# Patient Record
Sex: FEMALE | Race: BLACK OR AFRICAN AMERICAN | Employment: FULL TIME | URBAN - METROPOLITAN AREA
[De-identification: names, ages, dates, MRNs, and addresses within clinical notes are randomized per-mention and may not be internally consistent; named-entity substitution may affect disease eponyms.]

---

## 2021-07-13 ENCOUNTER — HOSPITAL ENCOUNTER (EMERGENCY)
Age: 21
Discharge: HOME | End: 2021-07-13
Attending: EMERGENCY MEDICINE

## 2021-07-13 VITALS
TEMPERATURE: 97.8 F | HEIGHT: 60 IN | DIASTOLIC BLOOD PRESSURE: 99 MMHG | OXYGEN SATURATION: 100 % | SYSTOLIC BLOOD PRESSURE: 156 MMHG | RESPIRATION RATE: 18 BRPM | WEIGHT: 132.94 LBS | BODY MASS INDEX: 26.1 KG/M2 | HEART RATE: 93 BPM

## 2021-07-13 DIAGNOSIS — I10 ASYMPTOMATIC HYPERTENSION: ICD-10-CM

## 2021-07-13 DIAGNOSIS — R03.0 ELEVATED BLOOD PRESSURE READING: Primary | ICD-10-CM

## 2021-07-13 PROCEDURE — 99282 EMERGENCY DEPT VISIT SF MDM: CPT

## 2021-07-13 NOTE — ED TRIAGE NOTES
Positive home pregnancy test. LMP 06/10/21. States 's at home. Hx HTN but not on medications, is trying home remedies. Blurry vision and near-syncope on way to work today. Symptoms resolved but having mild HA.

## 2021-07-13 NOTE — DISCHARGE INSTRUCTIONS
Please schedule an appointment to establish care with PCP. If you develop any new or concerning symptoms please return to ER.

## 2021-07-13 NOTE — Clinical Note
[address]  [address] [facility] [id number]  [phone]    Work/School Note    Date: 7/13/2021    To Whom It May concern:      Karl Gibson was seen and treated today in the emergency room by the following provider(s):  Attending Provider: Kimani Chowdhury MD  Physician Assistant: Fleurette Cogan, PA. Karl Gibson is excused from work/school on 07/13/21. She is clear to return to work/school on 07/14/21.         Sincerely,          Tamela Severin, PA

## 2021-07-13 NOTE — ED PROVIDER NOTES
Patient is a 23year old female with a history of hypertension who presents to ED c/o elevated blood pressure. Reports she recently found out she was pregnant, LMP 06/10/21. She was seen for new pregnancy and had UA and basic labs performed this week which were normal. States she was told to see a PCP for blood pressure management so she came to be seen by ED. States earlier today she had an episode of nausea and lightheadedness which she attributed to her BP. States these symptoms have resolved and she is currently asymptomatic. She denies any chest pain, shortness of braeth, headache, visual changes, difficulty breathing, vaginal bleeding/discharge, syncope. She is seeking natural remedies for blood pressure management. States she eats vegan. Past Medical History:   Diagnosis Date    Asthma     Depression     Eczema     Hypertension     Second hand smoke exposure     Vision decreased     glasses used, c/o vision problems       No past surgical history on file.       Family History:   Problem Relation Age of Onset    Hypertension Mother     Asthma Sister     Asthma Brother     Hypertension Maternal Uncle     Hypertension Maternal Grandmother     Asthma Maternal Grandmother     Cancer Other         breast cancer       Social History     Socioeconomic History    Marital status: SINGLE     Spouse name: Not on file    Number of children: Not on file    Years of education: Not on file    Highest education level: Not on file   Occupational History    Not on file   Tobacco Use    Smoking status: Never Smoker   Substance and Sexual Activity    Alcohol use: No    Drug use: No    Sexual activity: Never   Other Topics Concern    Not on file   Social History Narrative    ** Merged History Encounter **          Social Determinants of Health     Financial Resource Strain:     Difficulty of Paying Living Expenses:    Food Insecurity:     Worried About Running Out of Food in the Last Year:     Ran Out of Food in the Last Year:    Transportation Needs:     Lack of Transportation (Medical):  Lack of Transportation (Non-Medical):    Physical Activity:     Days of Exercise per Week:     Minutes of Exercise per Session:    Stress:     Feeling of Stress :    Social Connections:     Frequency of Communication with Friends and Family:     Frequency of Social Gatherings with Friends and Family:     Attends Scientologist Services:     Active Member of Clubs or Organizations:     Attends Club or Organization Meetings:     Marital Status:    Intimate Partner Violence:     Fear of Current or Ex-Partner:     Emotionally Abused:     Physically Abused:     Sexually Abused: ALLERGIES: Patient has no known allergies. Review of Systems   Constitutional: Negative for chills, fatigue and fever. HENT: Negative for congestion and sore throat. Eyes: Negative for pain, discharge and visual disturbance. Respiratory: Negative for cough and shortness of breath. Cardiovascular: Negative for chest pain, palpitations and leg swelling. Gastrointestinal: Negative for abdominal pain, diarrhea, nausea and vomiting. Genitourinary: Positive for vaginal discharge. Negative for dysuria, urgency and vaginal bleeding. Musculoskeletal: Negative for back pain and neck pain. Skin: Negative for rash. Allergic/Immunologic: Negative for immunocompromised state. Neurological: Negative for syncope, weakness, light-headedness and headaches. Psychiatric/Behavioral: Negative for confusion. All other systems reviewed and are negative. Vitals:    07/13/21 1340   BP: (!) 162/101   Pulse: 93   Resp: 18   Temp: 97.8 °F (36.6 °C)   SpO2: 98%   Weight: 60.3 kg (132 lb 15 oz)   Height: 5' (1.524 m)            Physical Exam  Vitals and nursing note reviewed. Constitutional:       General: She is not in acute distress. Appearance: Normal appearance. She is well-developed. She is not toxic-appearing. HENT:      Head: Normocephalic and atraumatic. Nose: Nose normal.      Mouth/Throat:      Mouth: Mucous membranes are moist.   Eyes:      General: Lids are normal.      Extraocular Movements: Extraocular movements intact. Conjunctiva/sclera: Conjunctivae normal.      Pupils: Pupils are equal, round, and reactive to light. Cardiovascular:      Rate and Rhythm: Normal rate and regular rhythm. Pulses: Normal pulses. Heart sounds: Normal heart sounds, S1 normal and S2 normal.   Pulmonary:      Effort: Pulmonary effort is normal. No accessory muscle usage. Breath sounds: Normal breath sounds. Abdominal:      Palpations: Abdomen is soft. Musculoskeletal:         General: Normal range of motion. Cervical back: Normal range of motion and neck supple. Skin:     General: Skin is warm and dry. Capillary Refill: Capillary refill takes less than 2 seconds. Neurological:      General: No focal deficit present. Mental Status: She is alert and oriented to person, place, and time. Mental status is at baseline. Psychiatric:         Attention and Perception: Attention normal.         Mood and Affect: Mood and affect normal.         Speech: Speech normal.         Behavior: Behavior is cooperative. Thought Content: Thought content normal.         Cognition and Memory: Cognition normal.         Judgment: Judgment normal.          MDM  Number of Diagnoses or Management Options  Asymptomatic hypertension  Elevated blood pressure reading  Diagnosis management comments: Patient is well appearing and asymptomatic at this time. She has been diagnosed with hypertension in the past. States due to genetics. Advised establishing care with PCP and follow-up with cardiology or nephrology given possible causes of secondary hypertension. Offered patient testing today which she declined given she was seen and had basic labs and UA drawn. Patient understands and agrees with plan.  All questions addressed and answered.        Amount and/or Complexity of Data Reviewed  Discuss the patient with other providers: yes (Dr. Cristi Abreu, Ed Attending )           Procedures

## 2021-09-02 ENCOUNTER — OFFICE VISIT (OUTPATIENT)
Dept: OBGYN CLINIC | Age: 21
End: 2021-09-02

## 2021-09-02 VITALS
DIASTOLIC BLOOD PRESSURE: 84 MMHG | BODY MASS INDEX: 26.7 KG/M2 | HEIGHT: 60 IN | WEIGHT: 136 LBS | SYSTOLIC BLOOD PRESSURE: 146 MMHG

## 2021-09-02 DIAGNOSIS — Z34.00 ENCOUNTER FOR SUPERVISION PREGNANCY IN PRIMIGRAVIDA, ANTEPARTUM: ICD-10-CM

## 2021-09-02 DIAGNOSIS — Z34.90 PREGNANCY, UNSPECIFIED GESTATIONAL AGE: Primary | ICD-10-CM

## 2021-09-02 PROCEDURE — 0502F SUBSEQUENT PRENATAL CARE: CPT | Performed by: OBSTETRICS & GYNECOLOGY

## 2021-09-02 RX ORDER — ASPIRIN 81 MG/1
81 TABLET ORAL DAILY
Qty: 90 TABLET | Refills: 3 | Status: SHIPPED | OUTPATIENT
Start: 2021-09-02 | End: 2022-02-04

## 2021-09-02 RX ORDER — LABETALOL 100 MG/1
100 TABLET, FILM COATED ORAL 2 TIMES DAILY
Qty: 60 TABLET | Refills: 5 | Status: SHIPPED | OUTPATIENT
Start: 2021-09-02 | End: 2021-12-30 | Stop reason: ALTCHOICE

## 2021-09-02 NOTE — PROGRESS NOTES
Current pregnancy history:    Kendell Ramos is a  23 y.o. female BLACK/ No LMP recorded. Patient is pregnant. She presents for the evaluation of amenorrhea and a positive pregnancy test.    LMP history:  The date of her LMP is uncertain. She thinks it was sometime in the end of May. Ultrasound data:  She had an ultrasound performed today in the office which revealed a viable fernandez pregnancy with a gestational age of 17w3d giving an [address] of 3/6/22. TA ULTRASOUND PERFORMED  A SINGLE VIABLE 13W4D WITH SANTANA OF 03/06/2022 IUP IS SEEN WITH NORMAL CARDIAC RHYTHM. GESTATIONAL AGE BASED ON TODAYS EXAM.  THERE APPEARS TO BE A POSTERIOR PLACENTA. RIGHT OVARY APPEARS WNL. LEFT OVARY APPEARS WNL. NO FREE FLUID SEEN IN THE CDS. Pregnancy symptoms:  Since her LMP she has experienced no significant symptoms. Mild nausea. She denies dysuria, discharge, vaginal bleeding. Past pregnancy history:  G1    Social:  Pt works as  at [employer]. FOB [employer] people move out. \"  _ _ _ _ _ _ _ _ _ _ _ _ _ _ _ _ _ _ _ _ _ _ _ _ _ _ _ _ _ _ _ _     Substance history: negative for alcohol, tobacco and street drugs. Positive for marijuana. Exposure history: There is/are no indoor cat/s in the home. The patient was instructed to not change the cat litter. She admits close contact with children on a regular basis. She has had chicken pox or the vaccine in the past.   Patient denies issues with domestic violence. Genetic Screening/Teratology Counseling: (Includes patient, baby's father, or anyone in either family with:)  3.  Patient's age >/= 28 at EDC?--no  2.   Thalassemia ([facility], [facility], [address], or  background): MCV<80?--no.     3.  Neural tube defect (meningomyelocele, spina bifida, anencephaly)?--no.   4.  Congenital heart defect?--no.  5.  Down syndrome?--no.   6.  Bryan-Sachs ([employer], [place])?--no.   7.  Canavan's Disease?--no.   8. Familial Dysautonomia?--no.   9.  Sickle cell disease or trait ()? --no   The patient has not been tested for sickle trait  10. Hemophilia or other blood disorders?--no. 11.  Muscular dystrophy?--no. 12.  Cystic fibrosis?--no. 13.  [place]'s Chorea?--no. 14.  Mental retardation/autism (if yes was person tested for Fragile X)?--no. 15.  Other inherited genetic or chromosomal disorder?--no. 12.  Maternal metabolic disorder (DM, PKU, etc)?--no. 17.  Patient or FOB with a child with a birth defect not listed above?--no.  17a. Patient or FOB with a birth defect themselves?--no. 18.  Recurrent pregnancy loss, or stillbirth?--no. 19.  Any medications since LMP other than prenatal vitamins (include vitamins, supplements, OTC meds, drugs, alcohol)?--no. 20.  Any other genetic/environmental exposure to discuss?--no. Infection History:  1. Lives with someone with TB or TB exposed?--no.   2.  Patient or partner has history of genital herpes?--no.  3.  Rash or viral illness since LMP?--no.    4.  History of STD (GC, CT, HPV, syphilis, HIV)? --no   5. Other: OTHER? Past Medical History:   Diagnosis Date    Asthma     Depression     Eczema     Hypertension     Second hand smoke exposure     Vision decreased     glasses used, c/o vision problems     No past surgical history on file.   Social History     Occupational History    Not on file   Tobacco Use    Smoking status: Never Smoker   Substance and Sexual Activity    Alcohol use: No    Drug use: No    Sexual activity: Never     Family History   Problem Relation Age of Onset    Hypertension Mother     Asthma Sister     Asthma Brother     Hypertension Maternal Uncle     Hypertension Maternal Grandmother     Asthma Maternal Grandmother     Cancer Other         breast cancer     OB History    Para Term  AB Living   1             SAB TAB Ectopic Molar Multiple Live Births                    # Outcome Date GA Lbr Ernesto/2nd Weight Sex Delivery Anes PTL Lv   1 Current              No Known Allergies  Prior to Admission medications    Medication Sig Start Date End Date Taking? Authorizing Provider   prenatal multivit-ca-min-fe-fa (Prenatal Vitamin) tab Take  by mouth. Yes Provider, Historical   labetaloL (NORMODYNE) 100 mg tablet Take 1 Tablet by mouth two (2) times a day. 9/2/21  Yes Alexi Burton MD   aspirin delayed-release 81 mg tablet Take 1 Tablet by mouth daily.  9/2/21  Yes Alexi Burton MD        Review of Systems: History obtained from the patient  Constitutional: negative for weight loss, fever, night sweats  HEENT: negative for hearing loss, earache, congestion, snoring, sore throat  CV: negative for chest pain, palpitations, edema  Resp: negative for cough, shortness of breath, wheezing  Breast: negative for breast lumps, nipple discharge, galactorrhea  GI: negative for change in bowel habits, abdominal pain, black or bloody stools  : negative for frequency, dysuria, hematuria, vaginal discharge  MSK: negative for back pain, joint pain, muscle pain  Skin: negative for itching, rash, hives  Neuro: negative for dizziness, headache, confusion, weakness  Psych: negative for anxiety, depression, change in mood  Heme/lymph: negative for bleeding, bruising, pallor    Objective:  Visit Vitals  BP (!) 146/84 (BP 1 Location: Left arm, BP Patient Position: Sitting)   Ht 5' (1.524 m)   Wt 136 lb (61.7 kg)   BMI 26.56 kg/m²       Physical Exam:     Constitutional  · Appearance: well-nourished, well developed, alert, in no acute distress    HENT  · Head  · Face: appears normal  · Eyes: appear normal  · Ears: normal  · Mouth: normal  · Lips: no lesions      Chest  · Respiratory Effort: breathing unlabored     Cardiovascular  · Heart:  · Auscultation: regular rate and rhythm without murmur      Gastrointestinal  · Abdominal Examination: abdomen non-tender to palpation, normal bowel sounds, no masses present  · Liver and spleen: no hepatomegaly present, spleen not palpable  · Hernias: no hernias identified      Skin  · General Inspection: no rash, no lesions identified    Neurologic/Psychiatric  · Mental Status:  · Orientation: grossly oriented to person, place and time  · Mood and Affect: mood normal, affect appropriate      Assessment and Plan:     Intrauterine pregnancy with issues addressed in problem list.  We discussed genetic testing screening options for the pregnancy and offered NIPT and the patient desires NIPT. We discussed carrier screening as per ACOG guidelines for CF, SMA, DMD, and Fragile X syndrome and the patient declines carrier screening. Course of pregnancy discussed including visit schedule, routine U/S, glucola testing, etc.  Avoid alcoholic beverages and illicit/recreational drugs use. Take prenatal vitamins or folic acid daily. Hospital and practice style discussed with coverage system. Discussed nutrition, toxoplasmosis precautions, sexual activity, exercise, need for influenza vaccine, environmental and work hazards, travel advice, screen for domestic violence, need for seat belts. Discussed seafood, unpasteurized dairy products, deli meat, artificial sweeteners, and caffeine. Discussed current prescription drug use. Given medication list.  Discussed the use of over the counter medications and chemicals. Route of delivery discussed, including risks, benefits  Handouts given to pt. RTO 4 weeks.     Herb Boothe MD

## 2021-09-02 NOTE — PATIENT INSTRUCTIONS
Weeks 10 to 14 of Your Pregnancy: Care Instructions  Overview     By weeks 10 to 14 of your pregnancy, the placenta has formed inside your uterus. The placenta's main job is to give your baby oxygen and nutrients through the umbilical cord. It's possible to hear your baby's heartbeat with a special ultrasound device. Your baby's organs are developing. The arms and legs can bend. This is a good time to think about testing for birth defects. There are two types of tests: screening and diagnostic. Screening tests show the chance that a baby has a certain birth defect. They can't tell you for sure that your baby has a problem. Diagnostic tests show if a baby has a certain birth defect. It's your choice whether to have these tests. You and your partner can talk to your doctor or midwife about tests for birth defects. Follow-up care is a key part of your treatment and safety. Be sure to make and go to all appointments, and call your doctor if you are having problems. It's also a good idea to know your test results and keep a list of the medicines you take. How can you care for yourself at home? Decide about tests  · You can have screening tests and diagnostic tests to check for birth defects. The decision to have a test for birth defects is personal. Think about your age, your chance of passing on a family disease, your need to know about any problems, and what you might do after you have the test results. ? Quadruple (quad) blood test. This screening test can be done between 15 and 22 weeks of pregnancy. It checks the amount of four substances in your blood. The doctor looks at these test results, along with your age and other factors, to find out the chance that your baby may have certain problems. ? Amniocentesis. This diagnostic test is used to look for chromosomal problems in the baby's cells.  It can be done between 15 and 20 weeks of pregnancy, usually around week 16.  ? Nuchal translucency test. This test uses ultrasound to measure the thickness of the area at the back of the baby's neck. An increase in the thickness can be an early sign of Down syndrome. ? Chorionic villus sampling (CVS). This is a test that looks for certain genetic problems with your baby. The same genes that are in your baby are in the placenta. A small piece of the placenta is taken out and tested. This test is done when you are 10 to 13 weeks pregnant. Ease discomfort  · Slow down and take naps when you feel tired. · If your emotions swing, talk to someone. · If your gums bleed, try a softer toothbrush. If your gums are puffy and bleed a lot, see your dentist.  · If you feel dizzy:  ? Get up slowly after sitting or lying down. ? Drink plenty of fluids. ? Eat small snacks to keep your blood sugar stable. ? Put your head between your legs as though you were tying your shoelaces. ? Lie down with your legs higher than your head. Use pillows to prop up your feet. · If you have a headache:  ? Lie down. ? Ask your partner or a good friend for a neck massage. ? Try cool cloths over your forehead or across the back of your neck. ? Use acetaminophen (Tylenol) for pain relief. Do not use nonsteroidal anti-inflammatory drugs (NSAIDs), such as ibuprofen (Advil, Motrin) or naproxen (Aleve), unless your doctor says it is okay. · If you have a nosebleed, pinch your nose gently, and hold it for a short while. To prevent nosebleeds, try massaging a small dab of petroleum jelly, such as Vaseline, in your nostrils. · If your nose is stuffed up, try saline (saltwater) nose sprays. Do not use decongestant sprays. Care for your breasts  · Wear a bra that gives you good support. · Know that changes in your breasts are normal.  ? Your breasts may get larger and more tender. Tenderness usually gets better by 12 weeks. ? Your nipples may get darker and larger, and small bumps around your nipples may show more. ?  The veins in your chest and breasts may show more. Where can you learn more? Go to http://www.gray.com/  Enter O640 in the search box to learn more about \"Weeks 10 to 14 of Your Pregnancy: Care Instructions. \"  Current as of: October 8, 2020               Content Version: 12.8  © 0596-0407 Healthwise, [employer]. Care instructions adapted under license by [employer] (which disclaims liability or warranty for this information). If you have questions about a medical condition or this instruction, always ask your healthcare professional. [address] any warranty or liability for your use of this information.

## 2021-09-07 ENCOUNTER — ROUTINE PRENATAL (OUTPATIENT)
Dept: OBGYN CLINIC | Age: 21
End: 2021-09-07

## 2021-09-07 DIAGNOSIS — Z36.9 ANTENATAL SCREENING ENCOUNTER: Primary | ICD-10-CM

## 2021-09-07 LAB
ANTIBODY SCREEN, EXTERNAL: NEGATIVE
CHLAMYDIA, EXTERNAL: NEGATIVE
HBSAG, EXTERNAL: NEGATIVE
HEPATITIS C AB,   EXT: NEGATIVE
N. GONORRHEA, EXTERNAL: NEGATIVE
RUBELLA, EXTERNAL: NORMAL
T. PALLIDUM, EXTERNAL: NORMAL
TYPE, ABO & RH, EXTERNAL: NORMAL

## 2021-09-08 LAB
ABO + RH BLD: NORMAL
BLOOD BANK CMNT PATIENT-IMP: NORMAL
BLOOD GROUP ANTIBODIES SERPL: NORMAL
ERYTHROCYTE [DISTWIDTH] IN BLOOD BY AUTOMATED COUNT: 13.2 % (ref 11.5–14.5)
HBV SURFACE AG SER QL: <0.1 INDEX
HBV SURFACE AG SER QL: NEGATIVE
HCT VFR BLD AUTO: 40.8 % (ref 35–47)
HCV AB SERPL QL IA: NONREACTIVE
HGB BLD-MCNC: 13 G/DL (ref 11.5–16)
HIV 1+2 AB+HIV1 P24 AG SERPL QL IA: NONREACTIVE
HIV12 RESULT COMMENT, HHIVC: NORMAL
MCH RBC QN AUTO: 30.7 PG (ref 26–34)
MCHC RBC AUTO-ENTMCNC: 31.9 G/DL (ref 30–36.5)
MCV RBC AUTO: 96.5 FL (ref 80–99)
NRBC # BLD: 0 K/UL (ref 0–0.01)
NRBC BLD-RTO: 0 PER 100 WBC
PLATELET # BLD AUTO: 337 K/UL (ref 150–400)
PMV BLD AUTO: 9 FL (ref 8.9–12.9)
RBC # BLD AUTO: 4.23 M/UL (ref 3.8–5.2)
RUBV IGG SER-IMP: REACTIVE
RUBV IGG SERPL IA-ACNC: 80.3 IU/ML
SPECIMEN EXP DATE BLD: NORMAL
WBC # BLD AUTO: 12.8 K/UL (ref 3.6–11)

## 2021-09-09 LAB
BACTERIA UR CULT: NORMAL
C TRACH RRNA SPEC QL NAA+PROBE: NEGATIVE
N GONORRHOEA RRNA SPEC QL NAA+PROBE: NEGATIVE
SPECIMEN STATUS REPORT, ROLRST: NORMAL
T PALLIDUM AB SER QL IA: NON REACTIVE
T VAGINALIS DNA SPEC QL NAA+PROBE: NEGATIVE
VZV IGG SER IA-ACNC: <135 INDEX

## 2021-09-10 LAB
HGB A MFR BLD: 97.5 % (ref 96.4–98.8)
HGB A2 MFR BLD COLUMN CHROM: 2.5 % (ref 1.8–3.2)
HGB F MFR BLD: 0 % (ref 0–2)
HGB FRACT BLD-IMP: NORMAL
HGB S MFR BLD: 0 %

## 2021-09-10 NOTE — PROGRESS NOTES
Please notify pt her initial prenatal labs are normal. Blood type O pos. VZV non-immune, we recommend the VZV vaccine with PCP after delivery. NIPT should return in 1-2 weeks.

## 2021-09-10 NOTE — PROGRESS NOTES
Patient informed of early OB lab results, including blood type and recommendation to receive VZV vaccine/booster after pregnancy

## 2021-09-28 ENCOUNTER — ROUTINE PRENATAL (OUTPATIENT)
Dept: OBGYN CLINIC | Age: 21
End: 2021-09-28
Payer: MEDICAID

## 2021-09-28 VITALS — BODY MASS INDEX: 27.73 KG/M2 | DIASTOLIC BLOOD PRESSURE: 78 MMHG | SYSTOLIC BLOOD PRESSURE: 136 MMHG | WEIGHT: 142 LBS

## 2021-09-28 DIAGNOSIS — Z34.00 ENCOUNTER FOR SUPERVISION PREGNANCY IN PRIMIGRAVIDA, ANTEPARTUM: ICD-10-CM

## 2021-09-28 DIAGNOSIS — O10.919 CHRONIC HYPERTENSION AFFECTING PREGNANCY: ICD-10-CM

## 2021-09-28 DIAGNOSIS — Z34.90 PREGNANCY, UNSPECIFIED GESTATIONAL AGE: Primary | ICD-10-CM

## 2021-09-28 PROCEDURE — 0502F SUBSEQUENT PRENATAL CARE: CPT | Performed by: OBSTETRICS & GYNECOLOGY

## 2021-09-28 NOTE — PATIENT INSTRUCTIONS
Weeks 14 to 18 of Your Pregnancy: Care Instructions  Overview     During this time, you may start to \"show,\" so that you look pregnant to people around you. You may also notice some changes in your skin, such as itchy spots on your palms or acne on your face. Your baby is now able to pass urine. And your baby's first stool (meconium) is starting to collect in your baby's intestines. Hair is also starting to grow on your baby's head. At your next visit, between weeks 18 and 20, your doctor may do an ultrasound test. The test allows your doctor to check for certain problems. Your doctor can also tell the sex of your baby. So this a good time to think about whether you want to know. Talk to your doctor about getting a flu shot to help keep you healthy during your pregnancy. As your pregnancy moves along, it's common to worry or feel anxious. Your body is changing a lot. And you are thinking about giving birth, the health of your baby, and becoming a parent. You can talk to your doctor about any anxiety and stress you feel. Follow-up care is a key part of your treatment and safety. Be sure to make and go to all appointments, and call your doctor if you are having problems. It's also a good idea to know your test results and keep a list of the medicines you take. How can you care for yourself at home? Reduce stress    · Ask for help with cooking and housekeeping.     · Figure out who or what causes your stress. Avoid these people or situations as much as possible.     · Relax every day. Taking 10- to 15-minute breaks can make a big difference. Take a walk, listen to music, or take a warm bath.     · Learn relaxation techniques at prenatal or yoga class. Or buy a relaxation tape.     · List your fears about having a baby and becoming a parent. Share the list with someone you trust. Decide which worries are really small, and try to let them go.    Exercise    · If you did not exercise much before pregnancy, start slowly. Walking is best. [employer], and do a little more every day.     · Brisk walking, easy jogging, low-impact aerobics, water aerobics, and yoga are good choices. Some sports, such as scuba diving, horseback riding, downhill skiing, gymnastics, and water skiing, are not a good idea.     · Try to do at least 2½ hours a week of moderate exercise, such as a fast walk. One way to do this is to be active 30 minutes a day, at least 5 days a week.     · Wear loose clothing. And wear shoes and a bra that provide good support.     · Warm up and cool down to start and finish your exercise.     · If you want to use weights, be sure to use light weights. They reduce stress on your joints. Stay at the best weight for you    · Experts recommend that you gain about 1 pound a month during the first 3 months of your pregnancy.     · Experts recommend that you gain about 1 pound a week during your last 6 months of pregnancy, for a total weight gain of 25 to 35 pounds.     · If you are underweight, you will need to gain more weight (about 28 to 40 pounds).     · If you are overweight, you may not need to gain as much weight (about 15 to 25 pounds).     · If you are gaining weight too fast, use common sense. Exercise every day, and limit sweets, fast foods, and fats. Choose lean meats, fruits, and vegetables.     · If you are having twins or more, your doctor may refer you to a dietitian. Where can you learn more? Go to http://www.gray.com/  Enter I453 in the search box to learn more about \"Weeks 14 to 18 of Your Pregnancy: Care Instructions. \"  Current as of: June 16, 2021               Content Version: 13.0  © 4537-1675 Healthwise, [employer]. Care instructions adapted under license by [employer] (which disclaims liability or warranty for this information).  If you have questions about a medical condition or this instruction, always ask your healthcare professional. [employer] disclaims any warranty or liability for your use of this information.

## 2021-09-28 NOTE — PROGRESS NOTES
Doing well overall  Concerned about mold on the walls of her home  Pre-E labs - urine today, CMP next visit (declined today). FS at next visit. Discussed my EDC and ML.

## 2021-10-12 ENCOUNTER — TELEPHONE (OUTPATIENT)
Dept: OBGYN CLINIC | Age: 21
End: 2021-10-12

## 2021-10-12 NOTE — TELEPHONE ENCOUNTER
Call received at 825am    24year old patient last seen in the office on 2021    Patient is  19w3d pregnant    Patient denies vaginal bleeding ,ROM, contractions and reports positive fetal movement    Patient calling to report cough with mucous ,respiratory infection?, per patient . Patient reports the symptoms for the past three days    Patient denies temperature and negative covid 19 test yesterday.         Patient was advise of otc products she can take , increase po fluids, rest and if symptoms continue to [facility] or [facility]    Patient verbalized understanding      LETTY

## 2021-10-12 NOTE — TELEPHONE ENCOUNTER
Josephine, MD Luis Antonio Roldan Tomasa Pereyra, RN  Caller: Unspecified (Today, 10:25 AM)  Sounds great, I agree with the recs provided, thank you.

## 2021-10-28 ENCOUNTER — ROUTINE PRENATAL (OUTPATIENT)
Dept: OBGYN CLINIC | Age: 21
End: 2021-10-28

## 2021-10-28 VITALS — BODY MASS INDEX: 29.1 KG/M2 | SYSTOLIC BLOOD PRESSURE: 138 MMHG | DIASTOLIC BLOOD PRESSURE: 84 MMHG | WEIGHT: 149 LBS

## 2021-10-28 DIAGNOSIS — Z34.00 ENCOUNTER FOR SUPERVISION PREGNANCY IN PRIMIGRAVIDA, ANTEPARTUM: Primary | ICD-10-CM

## 2021-10-28 DIAGNOSIS — O10.919 CHRONIC HYPERTENSION AFFECTING PREGNANCY: ICD-10-CM

## 2021-10-28 LAB
ALBUMIN SERPL-MCNC: 2.8 G/DL (ref 3.5–5)
ALBUMIN/GLOB SERPL: 0.7 {RATIO} (ref 1.1–2.2)
ALP SERPL-CCNC: 66 U/L (ref 45–117)
ALT SERPL-CCNC: 13 U/L (ref 12–78)
ANION GAP SERPL CALC-SCNC: 4 MMOL/L (ref 5–15)
AST SERPL-CCNC: 8 U/L (ref 15–37)
BILIRUB SERPL-MCNC: 0.3 MG/DL (ref 0.2–1)
BUN SERPL-MCNC: 7 MG/DL (ref 6–20)
BUN/CREAT SERPL: 14 (ref 12–20)
CALCIUM SERPL-MCNC: 9.1 MG/DL (ref 8.5–10.1)
CHLORIDE SERPL-SCNC: 105 MMOL/L (ref 97–108)
CO2 SERPL-SCNC: 27 MMOL/L (ref 21–32)
COMMENT, HOLDF: NORMAL
CREAT SERPL-MCNC: 0.49 MG/DL (ref 0.55–1.02)
CREAT UR-MCNC: 30.7 MG/DL
GLOBULIN SER CALC-MCNC: 3.8 G/DL (ref 2–4)
GLUCOSE SERPL-MCNC: 72 MG/DL (ref 65–100)
POTASSIUM SERPL-SCNC: 4 MMOL/L (ref 3.5–5.1)
PROT SERPL-MCNC: 6.6 G/DL (ref 6.4–8.2)
PROT UR-MCNC: 10 MG/DL (ref 0–11.9)
PROT/CREAT UR-RTO: 0.3
SAMPLES BEING HELD,HOLD: NORMAL
SODIUM SERPL-SCNC: 136 MMOL/L (ref 136–145)

## 2021-10-28 PROCEDURE — 0502F SUBSEQUENT PRENATAL CARE: CPT | Performed by: OBSTETRICS & GYNECOLOGY

## 2021-10-28 NOTE — PATIENT INSTRUCTIONS
Weeks 18 to 22 of Your Pregnancy: Care Instructions  Overview     Your baby is continuing to develop quickly. Sometime between 18 and 22 weeks, you'll probably start to feel your baby move. At first, these small fetal movements feel like fluttering or \"butterflies. \" Or they may feel like gas bubbles. As your baby grows, these movements will become stronger. You may also notice that your baby hiccups. Babies at this stage can now suck their thumbs. You may find that your nausea and fatigue are gone. You may feel better overall and have more energy than you did in your first trimester. But you might now also have some new discomforts, like sleep problems or leg cramps. Talk to your doctor about things you can do at home to ease these problems. Follow-up care is a key part of your treatment and safety. Be sure to make and go to all appointments, and call your doctor if you are having problems. It's also a good idea to know your test results and keep a list of the medicines you take. How can you care for yourself at home? Ease sleep problems  · Avoid caffeine in drinks or chocolate late in the day. · Get some exercise every day. · Take a warm shower or bath before bed. · Have a light snack or glass of milk at bedtime. · Do relaxation exercises in bed to calm your mind and body. · Support your legs and back with extra pillows. Try a pillow between your legs if you sleep on your side. · Do not use sleeping pills or alcohol. They could harm your baby. Ease leg cramps  · Do not massage your calf during the cramp. · Sit on a firm bed or chair. Straighten your leg, and bend your foot (flex your ankle) slowly upward, toward your knee. Bend your toes up and down. · Stand on a cool, flat surface. Stretch your toes upward, and take small steps walking on your heels. · Use a heating pad or hot water bottle to help with muscle ache. Prevent leg cramps  · Be sure to get enough calcium.  If you are worried that you are not getting enough, talk to your doctor. · Exercise every day, and stretch your legs before bed. · Take a warm bath before bed, and try leg warmers at night. Where can you learn more? Go to http://www.gray.com/  Enter S061 in the search box to learn more about \"Weeks 18 to 22 of Your Pregnancy: Care Instructions. \"  Current as of: June 16, 2021               Content Version: 13.0  © 2112-7294 [employer]. Care instructions adapted under license by [employer] (which disclaims liability or warranty for this information). If you have questions about a medical condition or this instruction, always ask your healthcare professional. [address] any warranty or liability for your use of this information.

## 2021-10-28 NOTE — PROGRESS NOTES
Doing well  Taking Labetalol BID and ASA 81mg  Feeling FM! L&D booklet given    US today:  FETAL SURVEY  A SINGLE VIABLE IUP AT 21W4D IS SEEN. FETAL CARDIAC MOTION OBSERVED. FETAL ANATOMY WAS WELL VISUALIZED AND APPEARS WNL. NO ABNORMALITIES WERE SEEN ON TODAYS EXAM.  APPROPRIATE GROWTH MEASURED. SIZE = DATES. ANU, PLACENTA AND CERVIX APPEAR WITHIN NORMAL LIMITS.   GENDER: MALE

## 2021-12-15 NOTE — PATIENT INSTRUCTIONS
Weeks 26 to 30 of Your Pregnancy: Care Instructions  Overview     You are now entering your last trimester of pregnancy. Your baby is growing quickly. Dafne Palacios probably feel your baby moving around more often. Your doctor may ask you to count your baby's kicks. Your back may ache as your body gets used to your baby's size and length. If you haven't already had the Tdap shot during this pregnancy, talk to your doctor about getting it. It will help protect your  against pertussis infection. During this time, it's important to take care of yourself and pay attention to what your body needs. If you feel sexual, you can explore ways to be close with your partner that match your comfort and desire. Follow-up care is a key part of your treatment and safety. Be sure to make and go to all appointments, and call your doctor if you are having problems. It's also a good idea to know your test results and keep a list of the medicines you take. How can you care for yourself at home? Take it easy at work  · Take frequent breaks. If possible, stop working when you are tired, and rest during your lunch hour. · Take bathroom breaks every 2 hours. · Change positions often. If you sit for long periods, stand up and walk around. · When you stand for a long time, keep one foot on a low stool with your knee bent. After standing a lot, sit with your feet up. · Avoid fumes, chemicals, and tobacco smoke. Be sexual in your own way  · Having sex during pregnancy is okay, unless your doctor tells you not to. · You may be very interested in sex, or you may have no interest at all. · Your growing belly can make it hard to find a good position during intercourse. [place] and explore. · You may get cramps in your uterus when your partner touches your breasts. · A back rub may relieve the backache or cramps that sometimes follow orgasm. Learn about  labor  · Watch for signs of  labor.  You may be going into labor if:  ? You have menstrual-like cramps, with or without nausea. ? You have about 6 or more contractions in 1 hour, even after you have had a glass of water and are resting. ? You have a low, dull backache that does not go away when you change your position. ? You have pain or pressure in your pelvis that comes and goes in a pattern. ? You have intestinal cramping or flu-like symptoms, with or without diarrhea.  ? You notice an increase or change in your vaginal discharge. Discharge may be heavy, mucus-like, watery, or streaked with blood. ? Your water breaks. · If you think you have  labor:  ? Drink 2 or 3 glasses of water or juice. Not drinking enough fluids can cause contractions. ? Stop what you are doing, and empty your bladder. Then lie down on your left side for at least 1 hour. ? While lying on your side, find your breast bone. Put your fingers in the soft spot just below it. Move your fingers down toward your belly button to find the top of your uterus. Check to see if it is tight. ? Contractions can be weak or strong. Record your contractions for an hour. Time a contraction from the start of one contraction to the start of the next one.  ? Single or several strong contractions without a pattern are called Nye-Brand contractions. They are practice contractions but not the start of labor. They often stop if you change what you are doing. ? Call your doctor if you have regular contractions. Where can you learn more? Go to http://www.gray.com/  Enter E866 in the search box to learn more about \"Weeks 26 to 30 of Your Pregnancy: Care Instructions. \"  Current as of: 2021               Content Version: 13.0  © 0795-5616 [employer]. Care instructions adapted under license by [employer] (which disclaims liability or warranty for this information).  If you have questions about a medical condition or this instruction, always ask your healthcare professional. [place] [address] any warranty or liability for your use of this information.

## 2021-12-16 ENCOUNTER — ROUTINE PRENATAL (OUTPATIENT)
Dept: OBGYN CLINIC | Age: 21
End: 2021-12-16
Payer: MEDICAID

## 2021-12-16 VITALS
WEIGHT: 153 LBS | DIASTOLIC BLOOD PRESSURE: 98 MMHG | BODY MASS INDEX: 30.04 KG/M2 | HEIGHT: 60 IN | SYSTOLIC BLOOD PRESSURE: 156 MMHG

## 2021-12-16 DIAGNOSIS — Z34.03 ENCOUNTER FOR SUPERVISION OF NORMAL FIRST PREGNANCY IN THIRD TRIMESTER: Primary | ICD-10-CM

## 2021-12-16 DIAGNOSIS — Z34.00 ENCOUNTER FOR SUPERVISION PREGNANCY IN PRIMIGRAVIDA, ANTEPARTUM: ICD-10-CM

## 2021-12-16 DIAGNOSIS — O99.810 IMPAIRED GLUCOSE TOLERANCE IN PREGNANCY: ICD-10-CM

## 2021-12-16 DIAGNOSIS — O10.919 CHRONIC HYPERTENSION AFFECTING PREGNANCY: ICD-10-CM

## 2021-12-16 DIAGNOSIS — Z23 ENCOUNTER FOR IMMUNIZATION: ICD-10-CM

## 2021-12-16 PROCEDURE — 0502F SUBSEQUENT PRENATAL CARE: CPT | Performed by: ADVANCED PRACTICE MIDWIFE

## 2021-12-16 RX ORDER — IBUPROFEN 200 MG
CAPSULE ORAL
Qty: 100 STRIP | Refills: 2 | Status: SHIPPED | OUTPATIENT
Start: 2021-12-16 | End: 2022-02-04

## 2021-12-16 RX ORDER — BLOOD-GLUCOSE METER
EACH MISCELLANEOUS
Qty: 1 EACH | Refills: 0 | Status: SHIPPED | OUTPATIENT
Start: 2021-12-16 | End: 2022-02-04

## 2021-12-16 RX ORDER — LANCETS
EACH MISCELLANEOUS
Qty: 100 LANCET | Refills: 2 | Status: SHIPPED | OUTPATIENT
Start: 2021-12-16 | End: 2022-02-04

## 2021-12-16 NOTE — PROGRESS NOTES
Doing well. Reports GFM  Declines GTT today.  Elevated BP  Does not feel \"comfortable ingesting ingredients in glucose drink\"  Agrees to check Blood sugars over next 2 wks and bring in log, Glucometer, strips and lancets sent in  Admits to not taking Labetalol as frequently as prescribed, has not taken in 3 days  Increase to 200 mg BID, to set alarm  Sent home with 24 hr Urine collection, plans to do Tues and Wed next wk as she is off from work will do [employer] [address] on return  Given note for work for frequent breaks, stands for hours at a time  RTO 2 wks, Growth scan

## 2021-12-16 NOTE — LETTER
12/16/2021 10:49 AM    Ms. Rafal Rao  [address] [address]      Ms. Deion Fan is pregnant and under the care of [facility]. Please allow her to have a break to sit for 5-10 minutes every hour while working, due to symptoms of pregnancy.           Sincerely,      Otoniel Armando NP

## 2021-12-30 ENCOUNTER — ROUTINE PRENATAL (OUTPATIENT)
Dept: OBGYN CLINIC | Age: 21
End: 2021-12-30

## 2021-12-30 VITALS — BODY MASS INDEX: 32.81 KG/M2 | WEIGHT: 168 LBS | SYSTOLIC BLOOD PRESSURE: 158 MMHG | DIASTOLIC BLOOD PRESSURE: 96 MMHG

## 2021-12-30 DIAGNOSIS — O10.919 HTN IN PREGNANCY, CHRONIC: Primary | ICD-10-CM

## 2021-12-30 LAB
ALBUMIN SERPL-MCNC: 3.1 G/DL (ref 3.5–5)
ALBUMIN/GLOB SERPL: 0.8 {RATIO} (ref 1.1–2.2)
ALP SERPL-CCNC: 122 U/L (ref 45–117)
ALT SERPL-CCNC: 22 U/L (ref 12–78)
ANION GAP SERPL CALC-SCNC: 7 MMOL/L (ref 5–15)
AST SERPL-CCNC: 16 U/L (ref 15–37)
BILIRUB SERPL-MCNC: 0.3 MG/DL (ref 0.2–1)
BUN SERPL-MCNC: 7 MG/DL (ref 6–20)
BUN/CREAT SERPL: 12 (ref 12–20)
CALCIUM SERPL-MCNC: 9.3 MG/DL (ref 8.5–10.1)
CHLORIDE SERPL-SCNC: 105 MMOL/L (ref 97–108)
CO2 SERPL-SCNC: 24 MMOL/L (ref 21–32)
CREAT SERPL-MCNC: 0.57 MG/DL (ref 0.55–1.02)
CREAT UR-MCNC: 106 MG/DL
ERYTHROCYTE [DISTWIDTH] IN BLOOD BY AUTOMATED COUNT: 12.3 % (ref 11.5–14.5)
GLOBULIN SER CALC-MCNC: 4.1 G/DL (ref 2–4)
GLUCOSE SERPL-MCNC: 83 MG/DL (ref 65–100)
HCT VFR BLD AUTO: 33.3 % (ref 35–47)
HGB BLD-MCNC: 10.6 G/DL (ref 11.5–16)
MCH RBC QN AUTO: 28.4 PG (ref 26–34)
MCHC RBC AUTO-ENTMCNC: 31.8 G/DL (ref 30–36.5)
MCV RBC AUTO: 89.3 FL (ref 80–99)
NRBC # BLD: 0 K/UL (ref 0–0.01)
NRBC BLD-RTO: 0 PER 100 WBC
PLATELET # BLD AUTO: 316 K/UL (ref 150–400)
PMV BLD AUTO: 9 FL (ref 8.9–12.9)
POTASSIUM SERPL-SCNC: 3.6 MMOL/L (ref 3.5–5.1)
PROT SERPL-MCNC: 7.2 G/DL (ref 6.4–8.2)
PROT UR-MCNC: 18 MG/DL (ref 0–11.9)
PROT/CREAT UR-RTO: 0.2
RBC # BLD AUTO: 3.73 M/UL (ref 3.8–5.2)
SODIUM SERPL-SCNC: 136 MMOL/L (ref 136–145)
URATE SERPL-MCNC: 3.8 MG/DL (ref 2.6–6)
WBC # BLD AUTO: 12.8 K/UL (ref 3.6–11)

## 2021-12-30 PROCEDURE — 0502F SUBSEQUENT PRENATAL CARE: CPT | Performed by: ADVANCED PRACTICE MIDWIFE

## 2021-12-30 RX ORDER — LABETALOL 200 MG/1
200 TABLET, FILM COATED ORAL 2 TIMES DAILY
Qty: 60 TABLET | Refills: 1 | Status: SHIPPED | OUTPATIENT
Start: 2021-12-30 | End: 2022-02-04

## 2021-12-30 NOTE — PROGRESS NOTES
Pt not taking her medication as prescribed-       Pt and SO both told that pt needs to take meds as RX'd or she needs to be admitted to the hospital for medication management. Pt told that she is at risk for stroke and placental abruption which can lead to maternal and fetal morbidity and mortality     Pt and SO both verbalized understanding. Pt to be off work this weekend and take medication and blood pressures twice a day- if pressures 150/100 pt is to call on call for direction- Pt to follow up in office Monday for blood pressure review, if blood pressures remain elevated pt to be admitted for blood pressure management. BPP today  NST Monday in office    Reviewed with Dr. Luba Flores- in agreement     LIMITED OB SCAN  A SINGLE VERTEX 30W4D IUP IS SEEN. FETAL CARDIAC MOTION OBSERVED AND APPEARS ABNORMAL. CLINICAL COORELATION RECOMMENDED. LIMITED ANATOMY WAS VISUALIZED AND APPEARS WNL. EFW= 3LB 9OZ ( 50 %)  BPP=8/8  ANU= 15.9 CM  PLACENTA APPEAR WITHIN NORMAL LIMITS.

## 2021-12-30 NOTE — LETTER
12/30/2021 4:09 PM    Ms. Ish Resendiz  [address] [address]      Ms. Iglesia Odonnell is currently pregnant and under the care of [facility]. Ms. Iglesia Odonnell is to stop work, effective immediately, until Tuesday, January 4th 2022, when a determination can be made whether she is to continue leave from work due to complications arising from pregnancy or may return to work.         Sincerely,      Juel Jeans, CNM

## 2021-12-30 NOTE — PATIENT INSTRUCTIONS
Weeks 30 to 32 of Your Pregnancy: Care Instructions  Overview     You've made it to the final months of your pregnancy! By now your baby is really starting to look like a baby, with hair and plump skin. As you enter the final weeks of pregnancy, the reality of having a baby may start to set in. This is a good time to set up a safe nursery and find quality  if needed. Doing this stuff ahead of time will allow you to focus on caring for and enjoying your new baby. You may also want to take a tour of your hospital's labor and delivery unit. This will help you get a better idea of what to expect while you're in the hospital.  During these last months, be sure to take good care of yourself. Pay attention to what your body needs. If your doctor says it's okay for you to work, don't push yourself too hard. If you haven't already had the Tdap shot during this pregnancy, talk to your doctor about getting it. It will help protect your  against pertussis infection. Follow-up care is a key part of your treatment and safety. Be sure to make and go to all appointments, and call your doctor if you are having problems. It's also a good idea to know your test results and keep a list of the medicines you take. How can you care for yourself at home? Pay attention to your baby's movements  · You should feel your baby move several times every day. · Your baby now turns less, and kicks and jabs more. · Your baby sleeps 20 to 45 minutes at a time and is more active at certain times of day. · If your doctor wants you to count your baby's kicks:  ? Empty your bladder, and lie on your side or relax in a comfortable chair. ? Write down your start time. ? Pay attention only to your baby's movements. Count any movement except hiccups. ? After you have counted 10 movements, write down your stop time. ? Write down how many minutes it took for your baby to move 10 times.   ? If an hour goes by and you have not recorded 10 movements, have something to eat or drink and then count for another hour. If you don't record at least 10 movements in the 2-hour period, call your doctor. Ease heartburn  · Eat small, frequent meals. · Do not eat chocolate, peppermint, or very spicy foods. Avoid drinks with caffeine, such as coffee, tea, and sodas. · Avoid bending over or lying down after meals. · Take a short walk after you eat. · If heartburn is a problem at night, do not eat for 2 hours before bedtime. · Take antacids like Mylanta, Maalox, Rolaids, or Tums. Do not take antacids that have sodium bicarbonate. Care for varicose veins  · Varicose veins are blood vessels that stretch out with the extra blood during pregnancy. Your legs may ache or throb. Most varicose veins will go away after the birth. · Avoid standing for long periods of time. Sit with your legs crossed at the ankles, not the knees. · Sit with your feet propped up. · Avoid tight clothing or stockings. Wear support hose. · Exercise regularly. Try walking for at least 30 minutes a day. Where can you learn more? Go to http://www.gray.com/  Enter X471 in the search box to learn more about \"Weeks 30 to 32 of Your Pregnancy: Care Instructions. \"  Current as of: June 16, 2021               Content Version: 13.0  © 0502-0181 [employer]. Care instructions adapted under license by [email or web address] (which disclaims liability or warranty for this information). If you have questions about a medical condition or this instruction, always ask your healthcare professional. [place] [address] any warranty or liability for your use of this information.

## 2022-01-02 NOTE — PATIENT INSTRUCTIONS
Weeks 30 to 32 of Your Pregnancy: Care Instructions  Overview     You've made it to the final months of your pregnancy! By now your baby is really starting to look like a baby, with hair and plump skin. As you enter the final weeks of pregnancy, the reality of having a baby may start to set in. This is a good time to set up a safe nursery and find quality  if needed. Doing this stuff ahead of time will allow you to focus on caring for and enjoying your new baby. You may also want to take a tour of your hospital's labor and delivery unit. This will help you get a better idea of what to expect while you're in the hospital.  During these last months, be sure to take good care of yourself. Pay attention to what your body needs. If your doctor says it's okay for you to work, don't push yourself too hard. If you haven't already had the Tdap shot during this pregnancy, talk to your doctor about getting it. It will help protect your  against pertussis infection. Follow-up care is a key part of your treatment and safety. Be sure to make and go to all appointments, and call your doctor if you are having problems. It's also a good idea to know your test results and keep a list of the medicines you take. How can you care for yourself at home? Pay attention to your baby's movements  · You should feel your baby move several times every day. · Your baby now turns less, and kicks and jabs more. · Your baby sleeps 20 to 45 minutes at a time and is more active at certain times of day. · If your doctor wants you to count your baby's kicks:  ? Empty your bladder, and lie on your side or relax in a comfortable chair. ? Write down your start time. ? Pay attention only to your baby's movements. Count any movement except hiccups. ? After you have counted 10 movements, write down your stop time. ? Write down how many minutes it took for your baby to move 10 times.   ? If an hour goes by and you have not recorded 10 movements, have something to eat or drink and then count for another hour. If you don't record at least 10 movements in the 2-hour period, call your doctor. Ease heartburn  · Eat small, frequent meals. · Do not eat chocolate, peppermint, or very spicy foods. Avoid drinks with caffeine, such as coffee, tea, and sodas. · Avoid bending over or lying down after meals. · Take a short walk after you eat. · If heartburn is a problem at night, do not eat for 2 hours before bedtime. · Take antacids like Mylanta, Maalox, Rolaids, or Tums. Do not take antacids that have sodium bicarbonate. Care for varicose veins  · Varicose veins are blood vessels that stretch out with the extra blood during pregnancy. Your legs may ache or throb. Most varicose veins will go away after the birth. · Avoid standing for long periods of time. Sit with your legs crossed at the ankles, not the knees. · Sit with your feet propped up. · Avoid tight clothing or stockings. Wear support hose. · Exercise regularly. Try walking for at least 30 minutes a day. Where can you learn more? Go to http://www.gray.com/  Enter X471 in the search box to learn more about \"Weeks 30 to 32 of Your Pregnancy: Care Instructions. \"  Current as of: June 16, 2021               Content Version: 13.0  © 5377-9611 [employer]. Care instructions adapted under license by [employer] (which disclaims liability or warranty for this information). If you have questions about a medical condition or this instruction, always ask your healthcare professional. [place] [address] any warranty or liability for your use of this information.

## 2022-01-03 ENCOUNTER — ROUTINE PRENATAL (OUTPATIENT)
Dept: OBGYN CLINIC | Age: 22
End: 2022-01-03
Payer: MEDICAID

## 2022-01-03 VITALS — DIASTOLIC BLOOD PRESSURE: 88 MMHG | BODY MASS INDEX: 32.61 KG/M2 | SYSTOLIC BLOOD PRESSURE: 136 MMHG | WEIGHT: 167 LBS

## 2022-01-03 DIAGNOSIS — Z34.00 ENCOUNTER FOR SUPERVISION PREGNANCY IN PRIMIGRAVIDA, ANTEPARTUM: ICD-10-CM

## 2022-01-03 PROCEDURE — 0502F SUBSEQUENT PRENATAL CARE: CPT | Performed by: ADVANCED PRACTICE MIDWIFE

## 2022-01-03 NOTE — PROGRESS NOTES
Doing well overall  Taking labetalol as rx'd-    Active FM  Has not done home BG checks - states supplies not available    Pt may return to work   Taking meds daily 200 mg BID  Take blood pressure daily  If blood pressures are 150/100 pt to call   RAAD 1 week  Pt to get BS testing supplies and take BS QID for next week and bring blood sugars for review offered GTT today - pt declines

## 2022-01-29 ENCOUNTER — HOSPITAL ENCOUNTER (INPATIENT)
Age: 22
LOS: 6 days | Discharge: HOME | DRG: 560 | End: 2022-02-04
Attending: OBSTETRICS & GYNECOLOGY | Admitting: OBSTETRICS & GYNECOLOGY
Payer: MEDICAID

## 2022-01-29 DIAGNOSIS — Z34.90 ENCOUNTER FOR ELECTIVE INDUCTION OF LABOR: ICD-10-CM

## 2022-01-29 DIAGNOSIS — O11.9 CHRONIC HYPERTENSION WITH SUPERIMPOSED PRE-ECLAMPSIA: ICD-10-CM

## 2022-01-29 PROBLEM — Z3A.34 34 WEEKS GESTATION OF PREGNANCY: Status: ACTIVE | Noted: 2022-01-29

## 2022-01-29 LAB
ALBUMIN SERPL-MCNC: 2.7 G/DL (ref 3.5–5)
ALBUMIN/GLOB SERPL: 0.8 {RATIO} (ref 1.1–2.2)
ALP SERPL-CCNC: 142 U/L (ref 45–117)
ALT SERPL-CCNC: 25 U/L (ref 12–78)
ANION GAP SERPL CALC-SCNC: 10 MMOL/L (ref 5–15)
AST SERPL-CCNC: 19 U/L (ref 15–37)
BASOPHILS # BLD: 0 K/UL (ref 0–0.1)
BASOPHILS NFR BLD: 0 % (ref 0–1)
BILIRUB SERPL-MCNC: 0.3 MG/DL (ref 0.2–1)
BUN SERPL-MCNC: 5 MG/DL (ref 6–20)
BUN/CREAT SERPL: 7 (ref 12–20)
CALCIUM SERPL-MCNC: 8.7 MG/DL (ref 8.5–10.1)
CHLORIDE SERPL-SCNC: 107 MMOL/L (ref 97–108)
CO2 SERPL-SCNC: 21 MMOL/L (ref 21–32)
CREAT SERPL-MCNC: 0.7 MG/DL (ref 0.55–1.02)
CREAT UR-MCNC: 13.2 MG/DL
DIFFERENTIAL METHOD BLD: ABNORMAL
EOSINOPHIL # BLD: 0.1 K/UL (ref 0–0.4)
EOSINOPHIL NFR BLD: 0 % (ref 0–7)
ERYTHROCYTE [DISTWIDTH] IN BLOOD BY AUTOMATED COUNT: 13.6 % (ref 11.5–14.5)
GLOBULIN SER CALC-MCNC: 3.6 G/DL (ref 2–4)
GLUCOSE BLD STRIP.AUTO-MCNC: 88 MG/DL (ref 65–117)
GLUCOSE SERPL-MCNC: 118 MG/DL (ref 65–100)
HCT VFR BLD AUTO: 30.3 % (ref 35–47)
HGB BLD-MCNC: 9.5 G/DL (ref 11.5–16)
IMM GRANULOCYTES # BLD AUTO: 0.1 K/UL (ref 0–0.04)
IMM GRANULOCYTES NFR BLD AUTO: 1 % (ref 0–0.5)
LYMPHOCYTES # BLD: 1.9 K/UL (ref 0.8–3.5)
LYMPHOCYTES NFR BLD: 14 % (ref 12–49)
MCH RBC QN AUTO: 26.1 PG (ref 26–34)
MCHC RBC AUTO-ENTMCNC: 31.4 G/DL (ref 30–36.5)
MCV RBC AUTO: 83.2 FL (ref 80–99)
MONOCYTES # BLD: 0.9 K/UL (ref 0–1)
MONOCYTES NFR BLD: 7 % (ref 5–13)
NEUTS SEG # BLD: 10.2 K/UL (ref 1.8–8)
NEUTS SEG NFR BLD: 78 % (ref 32–75)
NRBC # BLD: 0 K/UL (ref 0–0.01)
NRBC BLD-RTO: 0 PER 100 WBC
PLATELET # BLD AUTO: 319 K/UL (ref 150–400)
PMV BLD AUTO: 9.1 FL (ref 8.9–12.9)
POTASSIUM SERPL-SCNC: 3.8 MMOL/L (ref 3.5–5.1)
PROT SERPL-MCNC: 6.3 G/DL (ref 6.4–8.2)
PROT UR-MCNC: 7 MG/DL (ref 0–11.9)
PROT/CREAT UR-RTO: 0.5
RBC # BLD AUTO: 3.64 M/UL (ref 3.8–5.2)
SERVICE CMNT-IMP: NORMAL
SODIUM SERPL-SCNC: 138 MMOL/L (ref 136–145)
WBC # BLD AUTO: 13.1 K/UL (ref 3.6–11)

## 2022-01-29 PROCEDURE — 74011250637 HC RX REV CODE- 250/637: Performed by: ADVANCED PRACTICE MIDWIFE

## 2022-01-29 PROCEDURE — 99283 EMERGENCY DEPT VISIT LOW MDM: CPT

## 2022-01-29 PROCEDURE — 82962 GLUCOSE BLOOD TEST: CPT

## 2022-01-29 PROCEDURE — 84156 ASSAY OF PROTEIN URINE: CPT

## 2022-01-29 PROCEDURE — 65410000002 HC RM PRIVATE OB

## 2022-01-29 PROCEDURE — 74011250636 HC RX REV CODE- 250/636: Performed by: ADVANCED PRACTICE MIDWIFE

## 2022-01-29 PROCEDURE — 36415 COLL VENOUS BLD VENIPUNCTURE: CPT

## 2022-01-29 PROCEDURE — 85025 COMPLETE CBC W/AUTO DIFF WBC: CPT

## 2022-01-29 PROCEDURE — 74011000250 HC RX REV CODE- 250: Performed by: ADVANCED PRACTICE MIDWIFE

## 2022-01-29 PROCEDURE — 4A1HXCZ MONITORING OF PRODUCTS OF CONCEPTION, CARDIAC RATE, EXTERNAL APPROACH: ICD-10-PCS | Performed by: OBSTETRICS & GYNECOLOGY

## 2022-01-29 PROCEDURE — 80053 COMPREHEN METABOLIC PANEL: CPT

## 2022-01-29 PROCEDURE — 75810000275 HC EMERGENCY DEPT VISIT NO LEVEL OF CARE

## 2022-01-29 PROCEDURE — 96360 HYDRATION IV INFUSION INIT: CPT

## 2022-01-29 PROCEDURE — 59025 FETAL NON-STRESS TEST: CPT

## 2022-01-29 RX ORDER — LABETALOL 200 MG/1
200 TABLET, FILM COATED ORAL EVERY 12 HOURS
Status: DISCONTINUED | OUTPATIENT
Start: 2022-01-29 | End: 2022-01-30

## 2022-01-29 RX ORDER — SODIUM CHLORIDE, SODIUM LACTATE, POTASSIUM CHLORIDE, CALCIUM CHLORIDE 600; 310; 30; 20 MG/100ML; MG/100ML; MG/100ML; MG/100ML
125 INJECTION, SOLUTION INTRAVENOUS CONTINUOUS
Status: DISCONTINUED | OUTPATIENT
Start: 2022-01-29 | End: 2022-02-01

## 2022-01-29 RX ORDER — BETAMETHASONE SODIUM PHOSPHATE AND BETAMETHASONE ACETATE 3; 3 MG/ML; MG/ML
12 INJECTION, SUSPENSION INTRA-ARTICULAR; INTRALESIONAL; INTRAMUSCULAR; SOFT TISSUE EVERY 24 HOURS
Status: DISCONTINUED | OUTPATIENT
Start: 2022-01-29 | End: 2022-01-30

## 2022-01-29 RX ORDER — ACETAMINOPHEN 325 MG/1
650 TABLET ORAL
Status: DISCONTINUED | OUTPATIENT
Start: 2022-01-29 | End: 2022-02-01

## 2022-01-29 RX ORDER — LANOLIN ALCOHOL/MO/W.PET/CERES
1 CREAM (GRAM) TOPICAL
Status: DISCONTINUED | OUTPATIENT
Start: 2022-01-30 | End: 2022-02-01

## 2022-01-29 RX ORDER — SODIUM CHLORIDE 0.9 % (FLUSH) 0.9 %
5-40 SYRINGE (ML) INJECTION AS NEEDED
Status: DISCONTINUED | OUTPATIENT
Start: 2022-01-29 | End: 2022-02-01

## 2022-01-29 RX ORDER — ONDANSETRON 2 MG/ML
4 INJECTION INTRAMUSCULAR; INTRAVENOUS
Status: DISCONTINUED | OUTPATIENT
Start: 2022-01-29 | End: 2022-02-01

## 2022-01-29 RX ORDER — SODIUM CHLORIDE 0.9 % (FLUSH) 0.9 %
5-40 SYRINGE (ML) INJECTION EVERY 8 HOURS
Status: DISCONTINUED | OUTPATIENT
Start: 2022-01-29 | End: 2022-02-01

## 2022-01-29 RX ORDER — FOLIC ACID/MULTIVIT,IRON,MINER 0.4MG-18MG
1 TABLET ORAL DAILY
Status: DISCONTINUED | OUTPATIENT
Start: 2022-01-30 | End: 2022-02-01

## 2022-01-29 RX ORDER — MAG HYDROX/ALUMINUM HYD/SIMETH 200-200-20
30 SUSPENSION, ORAL (FINAL DOSE FORM) ORAL
Status: DISCONTINUED | OUTPATIENT
Start: 2022-01-29 | End: 2022-02-01

## 2022-01-29 RX ORDER — ONDANSETRON 2 MG/ML
4 INJECTION INTRAMUSCULAR; INTRAVENOUS
Status: DISCONTINUED | OUTPATIENT
Start: 2022-01-29 | End: 2022-01-29 | Stop reason: SDUPTHER

## 2022-01-29 RX ORDER — DIPHENHYDRAMINE HCL 25 MG
25 CAPSULE ORAL
Status: DISCONTINUED | OUTPATIENT
Start: 2022-01-29 | End: 2022-02-01

## 2022-01-29 RX ADMIN — LABETALOL HYDROCHLORIDE 200 MG: 200 TABLET, FILM COATED ORAL at 20:38

## 2022-01-29 RX ADMIN — SODIUM CHLORIDE, POTASSIUM CHLORIDE, SODIUM LACTATE AND CALCIUM CHLORIDE 999 ML/HR: 600; 310; 30; 20 INJECTION, SOLUTION INTRAVENOUS at 14:28

## 2022-01-29 RX ADMIN — SODIUM CHLORIDE, PRESERVATIVE FREE 10 ML: 5 INJECTION INTRAVENOUS at 17:29

## 2022-01-29 NOTE — CONSULTS
[address] [facility]  Prenatal Consult  Yenni Adams MRN: [id number] [facility]: [id number]  Note Date: 2022? Note Time: 15:45:00  Place of Service:  InPatient? Reason for Consultation: prematurity  Maternal History  : 2000? Mother's Age: 25? Blood Type: O Pos? Mother's Race: Black? ? RPR Serology: Non-Reactive? HIV: Negative? Rubella:  Immune? HBsAg: Negative? Prenatal Care: Yes? EDC OB:   Pregnancy Complications  Chronic hypertension, 3rd trimester (O10.013)  Pre-eclampsia with pre-exist HTN, 3rd trimester (O11.3)  Maternal Medications: Yes  Betamethasone  Prenatal vitamins  Ferrous Sulfate  Recommendations  I have reviewed the mother`s medical chart and spoken with the obstetrician requesting this consult. I met with the mother and father. The baby's name will be  Lizett Martini. I reviewed the most common problems encountered for babies born at 29 weeks gestation, stressing that all babies are different, and the chances of complications tend to lessen at advancing gestational ages. While most any organ system can have a complication, the absolute risk of severe complications is very low and the opportunity for a good outcome is high. Among infants admitted to the NICU, survival is approximately 99% and survival without severe complications is very high. I discussed the delivery room management, and explained the personnel that will be present at delivery. I reviewed the plan for delayed cord clamping (if appropriate) and thermoregulation support. Some infants at this age need respiratory support due to lung immaturity, commonly CPAP or a nasal cannula, though some need mechanical ventilation. We also discussed the use of artificial surfactant, which may or may not be needed. While uncommon, some infants need other measures in their resuscitation (e.g. CPR, fluid, blood). I reviewed the typical cares given during admission to the NICU.   Some infants at this gestation need IV access and that may be an umbilical catheter(s), PICC line or peripheral IV to allow nutrition. When sufficiently stable, gavage feedings will be started. We discussed the critical importance of lactation to optimize outcome (improved neurodevelopment, reduced risk of NEC and infections among other things). We discussed how we will support mothers lactation. We discussed common discharge goals, including mature thermoregulation, mature respiratory status and ability to thrive on oral feedings. Many infants achieve this around 36-39 weeks corrected gestation, although some infants are ready sooner and some take longer. Time was allotted for the family to ask questions, and all questions were answered to the best of my ability, given the information provided. The family understands and agrees with the present plan. Thank you for inviting me to speak with the family. We remain available if the family desires further discussion or clarification. The total length of floor unit time was 30 minute(s). Counseling and/or coordination of care dominated more than fifty percent of the time.   Authenticated by: Bryan Sanchez MD   Date/Time: 01/29/2022 17:08

## 2022-01-29 NOTE — PROGRESS NOTES
1355: pt here with c/o hypertension at work. She wasn't feeling well at work and her BP was 170/103. Has been experiencing a headache and visual disturbances over the past couple days. Denies epigastric pain, vaginal bleeding. 1703: pt transferred to [facility] via wheelchair  1720: Verbal shift change report given to Jeb Gilbert RN (oncoming nurse) by FUNMILAYO Lipscomb RN (offgoing nurse). Report included the following information SBAR.

## 2022-01-29 NOTE — H&P
History & Physical    Name: Meg Newsome MRN: [id number]  SSN: [id number]    YOB: 2000  Age: 24 y.o. Sex: female        Subjective:     Estimated Date of Delivery: 3/6/22  OB History        1    Para        Term                AB        Living           SAB        IAB        Ectopic        Molar        Multiple        Live Births                    Ms. Emily Silvestre is admitted with pregnancy at 34w6d for Chronic HTN with Superimposed Pre-E. Prenatal course was complicated by chronic hypertension and preeclampsia. Ms. Emily Silvestre presented to [facility] with pregnancy at 34w6d for elevated BP @ work. (170/110) Prenatal course was complicated by chronic hypertension. Please see prenatal records for details. Has been non-compliant with taking BP meds as prescribed previously as well as has declined Glucose testing for GDM and has not been checking Blood sugars. Currently on Labetalol 200 mg BID, reports last dosing was \"around 10ish\" and took BP @ 1100 d/t feeling poorly. Works as  @ [employer], was initially told could sit during work, but d/t short-staffing has had to stand and bag groceries. Patient Active Problem List    Diagnosis    Encounter for supervision pregnancy in primigravida, antepartum     Primary Provider: Josephine GUILLERMO  EDC by Oj Martinez 49    Pregnancy problems:  Hypertension: hereditary, predates pregnancy; never been on medication.  Start on labetalol 100mg bid at 13 weeks---> increase to 200 bid /16 on baby Aspirin (not taking consistently), check baseline preE labs normal and baseline urine p/c ratio 0.3  24 hr urine___    Growth 4 weeks in third tri ___, fetal surv at 32 weeks ___  Marijuana use: discussed cessation    IOB labs: O pos, normal, VZV NI--> PP vaccine, urine cx neg, GC/Chl neg  Genetic Screening: NIPT low risk MALE  Anatomy: normal MALE, posterior placenta  Flu:  TDAP: NEEDS  Third Tri Labs: Declined Glucose, Rx sent for Glucometer, strips   Blood sugar log: GBS:    Pain mgmt. in labor:  Feeding:  Circ:  Social: . Pt is a  at [employer]. [employer] - helps with moving      Chronic headaches     Head CT normal [facility] 1/2012       No specialty comments available. Past Medical History:   Diagnosis Date    Asthma     Depression     Eczema     Hypertension     Second hand smoke exposure     Vision decreased     glasses used, c/o vision problems     No past surgical history on file. Social History     Occupational History    Not on file   Tobacco Use    Smoking status: Never Smoker    Smokeless tobacco: Never Used   Substance and Sexual Activity    Alcohol use: No    Drug use: No    Sexual activity: Never     Family History   Problem Relation Age of Onset    Hypertension Mother     Asthma Sister     Asthma Brother     Hypertension Maternal Uncle     Hypertension Maternal Grandmother     Asthma Maternal Grandmother     Cancer Other         breast cancer       No Known Allergies  Prior to Admission medications    Medication Sig Start Date End Date Taking? Authorizing Provider   labetaloL (NORMODYNE) 200 mg tablet Take 1 Tablet by mouth two (2) times a day. 12/30/21  Yes Priti Najera CNM   prenatal multivit-ca-min-fe-fa (Prenatal Vitamin) tab Take  by mouth. Yes Provider, Historical   aspirin delayed-release 81 mg tablet Take 1 Tablet by mouth daily. 9/2/21  Yes Fabiana Burton MD   Blood-Glucose Meter misc Dispense 1 glucose meter for 4 times daily testing. Any brand covered by insurance is fine  Patient not taking: Reported on 12/30/2021 12/16/21   Carlos Samano NP   glucose blood VI test strips (blood glucose test) strip Dispense glucose test strips for 4 times daily testing. Any brand covered by insurance is fine  Patient not taking: Reported on 12/30/2021 12/16/21   Carlos Samano NP   lancets misc Dispense lancets for 4 times daily testing. Any brand covered by insurance is fine.   Patient not taking: Reported on 12/30/2021 12/16/21 Scott Lutz NP        Review of Systems: A comprehensive review of systems was negative except for that written in the HPI. Objective:     Vitals:  Vitals:    01/29/22 1404 01/29/22 1414   BP: (!) 146/92 132/84   Pulse: (!) 120 (!) 123   Resp: 15    Temp: 98.2 °F (36.8 °C)    SpO2:  99%          Physical Exam:  Heart: Regular rate and rhythm or without murmur or extra heart sounds  Lung: clear to auscultation throughout lung fields, no wheezes, no rales, no rhonchi and normal respiratory effort  Abdomen: soft, nontender  Fundus: soft and non tender  Perineum: blood absent, amniotic fluid absent  Lower Extremities:  - Edema No  Membranes:  Intact  Fetal Heart Rate: Reactive  Baseline: 135 per minute, Cat I  Variability: moderate  Accelerations: yes  Decelerations: none  Uterine contractions: none       Prenatal Labs:   Opos  ABS Neg  GCC/Trich  Neg/Neg/Neg  NIPT Low risk Male  T Pal Neg  Hep C Neg  Hep B Neg  HIV NR  VZV NI  Rub Immune  PCR 10/28 .3  Uric Acid 12/30 3.8    Assessment/Plan:     Plan: Admit for Betamethasone 12 mg Q 24 hrs x 2  Stabilize BPs adjust meds prn  24 hr Urine collection  Blood Glucose monitoring (pt has been unable to check blood sugars and declined Glucose test)  NICU consult done  [facility] Monday    HARSH Vernon/ROCIO            Signed By:  Jeremy Morin NP     January 29, 2022

## 2022-01-29 NOTE — ED PROVIDER NOTES
[facility] Provider Note    Name: Rhett March MRN: [id number]  SSN: [id number]    YOB: 2000  Age: 24 y.o. Sex: female        Subjective:     Estimated Date of Delivery: 3/6/22  OB History        1    Para        Term                AB        Living           SAB        IAB        Ectopic        Molar        Multiple        Live Births                    Ms. Yina Francis presents to [facility] with pregnancy at 34w6d for elevated BP @ work. (170/110) Prenatal course was complicated by chronic hypertension. Please see prenatal records for details. Has been non-compliant with taking BP meds as prescribed previously as well as has declined Glucose testing for GDM and has not been checking Blood sugars. Currently on Labetalol 200 mg BID, reports last dosing was \"around 10ish\" and took BP @ 1100 d/t feeling poorly. Works as  @ [employer], was initially told could sit during work, but d/t short-staffing has had to stand and bag groceries. Prenatal Labs:   Opos  ABS Neg  GCC/Trich  Neg/Neg/Neg  NIPT Low risk Male  T Pal Neg  Hep C Neg  Hep B Neg  HIV NR  VZV NI  Rub Immune  PCR 10/28 .3  Uric Acid  3.8          Patient Active Problem List    Diagnosis    Encounter for supervision pregnancy in primigravida, antepartum     Primary Provider: Josehpine GUILLERMO  EDC by Oj Martinez 49    Pregnancy problems:  Hypertension: hereditary, predates pregnancy; never been on medication.  Start on labetalol 100mg bid at 13 weeks---> increase to 200 bid  on baby Aspirin (not taking consistently), check baseline preE labs normal and baseline urine p/c ratio 0.3  24 hr urine___    Growth 4 weeks in third tri ___, fetal surv at 32 weeks ___  Marijuana use: discussed cessation    IOB labs: O pos, normal, VZV NI--> PP vaccine, urine cx neg, GC/Chl neg  Genetic Screening: NIPT low risk MALE  Anatomy: normal MALE, posterior placenta  Flu:  TDAP: NEEDS  Third Tri Labs: Declined Glucose, Rx sent for Glucometer, strips   Blood sugar log:   GBS:    Pain mgmt. in labor:  Feeding:  Circ:  Social: . Pt is a  at [employer]. YESSI Rutherford - helps with moving      Chronic headaches     Head CT normal [facility] 1/2012       No specialty comments available. Past Medical History:   Diagnosis Date    Asthma     Depression     Eczema     Hypertension     Second hand smoke exposure     Vision decreased     glasses used, c/o vision problems     No past surgical history on file. Social History     Occupational History    Not on file   Tobacco Use    Smoking status: Never Smoker    Smokeless tobacco: Never Used   Substance and Sexual Activity    Alcohol use: No    Drug use: No    Sexual activity: Never     Family History   Problem Relation Age of Onset    Hypertension Mother     Asthma Sister     Asthma Brother     Hypertension Maternal Uncle     Hypertension Maternal Grandmother     Asthma Maternal Grandmother     Cancer Other         breast cancer       No Known Allergies  Prior to Admission medications    Medication Sig Start Date End Date Taking? Authorizing Provider   labetaloL (NORMODYNE) 200 mg tablet Take 1 Tablet by mouth two (2) times a day. 12/30/21  Yes Stacy Pitts CNM   prenatal multivit-ca-min-fe-fa (Prenatal Vitamin) tab Take  by mouth. Yes Provider, Historical   aspirin delayed-release 81 mg tablet Take 1 Tablet by mouth daily. 9/2/21  Yes Love, Hessie Severs, MD   Blood-Glucose Meter misc Dispense 1 glucose meter for 4 times daily testing. Any brand covered by insurance is fine  Patient not taking: Reported on 12/30/2021 12/16/21   Alex Merchant NP   glucose blood VI test strips (blood glucose test) strip Dispense glucose test strips for 4 times daily testing. Any brand covered by insurance is fine  Patient not taking: Reported on 12/30/2021 12/16/21   Alex Merchant NP   lancets misc Dispense lancets for 4 times daily testing. Any brand covered by insurance is fine.   Patient not taking: Reported on 12/30/2021 12/16/21   Jorge Luis Smith NP        Review of Systems   Constitutional: Negative. HENT: Negative. Eyes: Positive for blurred vision. Respiratory: Negative. Cardiovascular: Negative. Gastrointestinal: Positive for nausea and vomiting. Genitourinary: Negative. Skin: Negative. Neurological: Positive for weakness and headaches. Endo/Heme/Allergies: Negative. Psychiatric/Behavioral: Negative. Objective:     Vitals:  Vitals:    01/29/22 1404 01/29/22 1414   BP: (!) 146/92 132/84   Pulse: (!) 120 (!) 123   Resp: 15    Temp: 98.2 °F (36.8 °C)    SpO2:  99%        Physical Exam:  Physical Exam  Patient without distress. Heart: Regular rate and rhythm or without murmur or extra heart sounds  Lung: clear to auscultation throughout lung fields, no wheezes, no rales, no rhonchi and normal respiratory effort  Abdomen: soft, nontender  Fundus: soft and non tender  Perineum: blood absent, amniotic fluid absent  Lower Extremities:  - Edema No  Membranes:  Intact  Fetal Heart Rate: Reactive  Baseline: 130 per minute, Cat I  Variability: moderate  Accelerations: yes  Decelerations: none  Uterine contractions: none      MDM    Procedures  NST  IVF  Pre-E labs  Seriel BPs      Assessment/Plan:     Plan:   IVF bolus  Zofran prn  Pre-E labs  NST  Seriel BPs    1530  Pt reports feeling better since IVF bolus  Labs returned PCR 0.5  WBC 13.1  H&H 9.5/30.3, Plt 313k  Plan: Will admit to [facility] for Steroids and continued monitoring  MFM to follow Monday  BID NST  Start 24 hr Urine  FBS and 1 hr PP over the next 2 days since pt declined Glucola and has not checked BG  Pt agrees with plan    HARSH Farah/ROCIO          Signed By:  Vickie Mejias NP     January 29, 2022

## 2022-01-29 NOTE — PROGRESS NOTES
Problem: Falls - Risk of  Goal: *Absence of Falls  Description: Document Sofia Fothergill Fall Risk and appropriate interventions in the flowsheet. Outcome: Progressing Towards Goal  Note: Fall Risk Interventions:            Medication Interventions: Teach patient to arise slowly                   Problem: Patient Education: Go to Patient Education Activity  Goal: Patient/Family Education  Outcome: Progressing Towards Goal     Problem: Pressure Injury - Risk of  Goal: *Prevention of pressure injury  Description: Document Nick Scale and appropriate interventions in the flowsheet. Outcome: Progressing Towards Goal  Note: Pressure Injury Interventions:             Activity Interventions: Increase time out of bed                               Problem: Patient Education: Go to Patient Education Activity  Goal: Patient/Family Education  Outcome: Progressing Towards Goal     Problem: Patient Education: Go to Patient Education Activity  Goal: Patient/Family Education  Outcome: Progressing Towards Goal     Problem: Antepartum: Day 1 through Discharge  Goal: Activity/Safety  Outcome: Progressing Towards Goal  Goal: Consults, if ordered  Outcome: Progressing Towards Goal  Goal: Diagnostic Test/Procedures  Outcome: Progressing Towards Goal  Goal: Treatments/Interventions/Procedures  Outcome: Progressing Towards Goal  Goal: *Hemodynamically stable  Outcome: Progressing Towards Goal     Problem: Antepartum: Discharge Outcomes  Goal: *Free from active signs of labor  Outcome: Progressing Towards Goal  Goal: *Absence of injury  Outcome: Progressing Towards Goal  Goal: *Reassuring fetal surveillance  Outcome: Progressing Towards Goal     Problem: Hypertension  Goal: *Blood pressure within specified parameters  Outcome: Progressing Towards Goal  Goal: *Fluid volume balance  Outcome: Progressing Towards Goal  Goal: *Labs within defined limits  Outcome: Progressing Towards Goal

## 2022-01-29 NOTE — PROGRESS NOTES
TRANSFER - IN REPORT:    Verbal report received from SHAHBAZ Roman(name) on Shelba Purple  being received from L&D(unit) for routine progression of care      Report consisted of patients Situation, Background, Assessment and   Recommendations(SBAR). Information from the following report(s) SBAR, MAR and Recent Results was reviewed with the receiving nurse. Opportunity for questions and clarification was provided. Assessment completed upon patients arrival to unit and care assumed.

## 2022-01-30 LAB
COLLECT DURATION TIME UR: 24 HR
GLUCOSE BLD STRIP.AUTO-MCNC: 94 MG/DL (ref 65–117)
GLUCOSE BLD STRIP.AUTO-MCNC: 99 MG/DL (ref 65–117)
PROT 24H UR-MRATE: 330 MG/24HR
SERVICE CMNT-IMP: NORMAL
SERVICE CMNT-IMP: NORMAL
SPECIMEN VOL ?TM UR: 2750 ML

## 2022-01-30 PROCEDURE — 3E033VJ INTRODUCTION OF OTHER HORMONE INTO PERIPHERAL VEIN, PERCUTANEOUS APPROACH: ICD-10-PCS | Performed by: OBSTETRICS & GYNECOLOGY

## 2022-01-30 PROCEDURE — 74011250637 HC RX REV CODE- 250/637: Performed by: ADVANCED PRACTICE MIDWIFE

## 2022-01-30 PROCEDURE — 59025 FETAL NON-STRESS TEST: CPT

## 2022-01-30 PROCEDURE — 74011000250 HC RX REV CODE- 250: Performed by: ADVANCED PRACTICE MIDWIFE

## 2022-01-30 PROCEDURE — 82962 GLUCOSE BLOOD TEST: CPT

## 2022-01-30 PROCEDURE — 65410000002 HC RM PRIVATE OB

## 2022-01-30 PROCEDURE — 74011250636 HC RX REV CODE- 250/636: Performed by: ADVANCED PRACTICE MIDWIFE

## 2022-01-30 RX ORDER — BETAMETHASONE SODIUM PHOSPHATE AND BETAMETHASONE ACETATE 3; 3 MG/ML; MG/ML
12 INJECTION, SUSPENSION INTRA-ARTICULAR; INTRALESIONAL; INTRAMUSCULAR; SOFT TISSUE EVERY 24 HOURS
Status: COMPLETED | OUTPATIENT
Start: 2022-01-30 | End: 2022-01-31

## 2022-01-30 RX ADMIN — LABETALOL HYDROCHLORIDE 200 MG: 200 TABLET, FILM COATED ORAL at 09:03

## 2022-01-30 RX ADMIN — FERROUS SULFATE TAB 325 MG (65 MG ELEMENTAL FE) 325 MG: 325 (65 FE) TAB at 08:30

## 2022-01-30 RX ADMIN — LABETALOL HYDROCHLORIDE 300 MG: 200 TABLET, FILM COATED ORAL at 19:10

## 2022-01-30 RX ADMIN — BETAMETHASONE SODIUM PHOSPHATE AND BETAMETHASONE ACETATE 12 MG: 3; 3 INJECTION, SUSPENSION INTRA-ARTICULAR; INTRALESIONAL; INTRAMUSCULAR at 10:01

## 2022-01-30 RX ADMIN — SODIUM CHLORIDE, PRESERVATIVE FREE 10 ML: 5 INJECTION INTRAVENOUS at 13:36

## 2022-01-30 RX ADMIN — Medication 1 TABLET: at 09:03

## 2022-01-30 NOTE — PROGRESS NOTES
Bedside and Verbal shift change report given to Rowdy Parsons (oncoming nurse) by Riccardo Alejo (offgoing nurse). Report included the following information SBAR, Kardex, MAR and Recent Results.

## 2022-01-30 NOTE — PROGRESS NOTES
Problem: Falls - Risk of  Goal: *Absence of Falls  Description: Document Zuly Angel Fall Risk and appropriate interventions in the flowsheet. Outcome: Progressing Towards Goal  Note: Fall Risk Interventions:            Medication Interventions: Teach patient to arise slowly                   Problem: Patient Education: Go to Patient Education Activity  Goal: Patient/Family Education  Outcome: Progressing Towards Goal     Problem: Pressure Injury - Risk of  Goal: *Prevention of pressure injury  Description: Document Nick Scale and appropriate interventions in the flowsheet. Outcome: Progressing Towards Goal  Note: Pressure Injury Interventions:             Activity Interventions: Increase time out of bed                               Problem: Patient Education: Go to Patient Education Activity  Goal: Patient/Family Education  Outcome: Progressing Towards Goal     Problem: Antepartum: Day 1 through Discharge  Goal: Off Pathway (Use only if patient is Off Pathway)  Outcome: Progressing Towards Goal  Goal: Activity/Safety  Outcome: Progressing Towards Goal  Goal: Diagnostic Test/Procedures  Outcome: Progressing Towards Goal  Goal: Treatments/Interventions/Procedures  Outcome: Progressing Towards Goal  Goal: *Hemodynamically stable  Outcome: Progressing Towards Goal     Problem: Antepartum: Discharge Outcomes  Goal: *Free from active signs of labor  Outcome: Progressing Towards Goal  Goal: *Absence of injury  Outcome: Progressing Towards Goal  Goal: *Reassuring fetal surveillance  Outcome: Progressing Towards Goal     Problem: Hypertension  Goal: *Blood pressure within specified parameters  Outcome: Progressing Towards Goal  Goal: *Fluid volume balance  Outcome: Progressing Towards Goal  Goal: *Labs within defined limits  Outcome: Progressing Towards Goal

## 2022-01-30 NOTE — PROGRESS NOTES
Ante Partum Progress Note    Anuja Yanez  35w0d    Assessment: 35w0d   Hypertension, chronic  and Pre-eclampsia, mild    Plan:  Continue hospitalization with hospitalized bedrest, Complete  steroid course and Maternal fetal medicine consultation tomorrow    Orders/Charges: Medium and Non Stress Test  X 2    Patient states she does not have headache , abdominal pain  , contractions, right upper quadrant pain  , vaginal bleeding , swelling and vaginal leaking of fluid     Initially declined Betamethasone, now is reluctantly open to receiving both injections. Will do this am and follow in 24 hrs  Increase Labetalol to 300 BID   MFM tomorrow  After Breakfast, may d/c blood sugar checks  Continue 24 hr Urine collection  Vitals:  Visit Vitals  BP (!) 157/99 (BP 1 Location: Right upper arm, BP Patient Position: At rest)   Pulse 89   Temp 98 °F (36.7 °C)   Resp 16   SpO2 100%     Temp (24hrs), Av.4 °F (36.9 °C), Min:98 °F (36.7 °C), Max:98.7 °F (37.1 °C)      Last 24hr Input/Output:  No intake or output data in the 24 hours ending 22 1002     Non stress test:  Reactive    No data found. No data found. Uterine Activity: None     Exam:  Patient without distress.      Abdomen, fundus soft non-tender     Extremities, no redness or tenderness               Additional Exam: Patient without distress    Labs:     Lab Results   Component Value Date/Time    WBC 13.1 (H) 2022 02:30 PM    WBC 12.8 (H) 2021 04:00 PM    WBC 12.8 (H) 2021 09:40 AM    WBC 8.8 2014 11:54 AM    WBC 8.4 2011 10:48 AM    WBC 9.1 2011 02:54 PM    HGB 9.5 (L) 2022 02:30 PM    HGB 10.6 (L) 2021 04:00 PM    HGB 13.0 2021 09:40 AM    HGB 13.8 (H) 2014 11:54 AM    HGB 13.5 2011 10:48 AM    HGB 13.9 2011 02:54 PM    HCT 30.3 (L) 2022 02:30 PM    HCT 33.3 (L) 2021 04:00 PM    HCT 40.8 2021 09:40 AM    HCT 40.7 (H) 2014 11:54 AM    HCT 41.2 08/26/2011 10:48 AM    HCT 42.3 05/17/2011 02:54 PM    PLATELET 723 90/22/8442 02:30 PM    PLATELET 946 03/36/9468 04:00 PM    PLATELET 869 91/77/9288 09:40 AM    PLATELET 482 81/38/2857 11:54 AM    PLATELET 247 89/57/7036 10:48 AM    PLATELET 543 (H) 46/97/6119 02:54 PM       Recent Results (from the past 24 hour(s))   CBC WITH AUTOMATED DIFF    Collection Time: 01/29/22  2:30 PM   Result Value Ref Range    WBC 13.1 (H) 3.6 - 11.0 K/uL    RBC 3.64 (L) 3.80 - 5.20 M/uL    HGB 9.5 (L) 11.5 - 16.0 g/dL    HCT 30.3 (L) 35.0 - 47.0 %    MCV 83.2 80.0 - 99.0 FL    MCH 26.1 26.0 - 34.0 PG    MCHC 31.4 30.0 - 36.5 g/dL    RDW 13.6 11.5 - 14.5 %    PLATELET 173 267 - 003 K/uL    MPV 9.1 8.9 - 12.9 FL    NRBC 0.0 0  WBC    ABSOLUTE NRBC 0.00 0.00 - 0.01 K/uL    NEUTROPHILS 78 (H) 32 - 75 %    LYMPHOCYTES 14 12 - 49 %    MONOCYTES 7 5 - 13 %    EOSINOPHILS 0 0 - 7 %    BASOPHILS 0 0 - 1 %    IMMATURE GRANULOCYTES 1 (H) 0.0 - 0.5 %    ABS. NEUTROPHILS 10.2 (H) 1.8 - 8.0 K/UL    ABS. LYMPHOCYTES 1.9 0.8 - 3.5 K/UL    ABS. MONOCYTES 0.9 0.0 - 1.0 K/UL    ABS. EOSINOPHILS 0.1 0.0 - 0.4 K/UL    ABS. BASOPHILS 0.0 0.0 - 0.1 K/UL    ABS. IMM. GRANS. 0.1 (H) 0.00 - 0.04 K/UL    DF AUTOMATED     METABOLIC PANEL, COMPREHENSIVE    Collection Time: 01/29/22  2:30 PM   Result Value Ref Range    Sodium 138 136 - 145 mmol/L    Potassium 3.8 3.5 - 5.1 mmol/L    Chloride 107 97 - 108 mmol/L    CO2 21 21 - 32 mmol/L    Anion gap 10 5 - 15 mmol/L    Glucose 118 (H) 65 - 100 mg/dL    BUN 5 (L) 6 - 20 MG/DL    Creatinine 0.70 0.55 - 1.02 MG/DL    BUN/Creatinine ratio 7 (L) 12 - 20      GFR est AA >60 >60 ml/min/1.73m2    GFR est non-AA >60 >60 ml/min/1.73m2    Calcium 8.7 8.5 - 10.1 MG/DL    Bilirubin, total 0.3 0.2 - 1.0 MG/DL    ALT (SGPT) 25 12 - 78 U/L    AST (SGOT) 19 15 - 37 U/L    Alk.  phosphatase 142 (H) 45 - 117 U/L    Protein, total 6.3 (L) 6.4 - 8.2 g/dL    Albumin 2.7 (L) 3.5 - 5.0 g/dL    Globulin 3.6 2.0 - 4.0 g/dL    A-G Ratio 0.8 (L) 1.1 - 2.2     PROTEIN/CREATININE RATIO, URINE    Collection Time: 01/29/22  2:30 PM   Result Value Ref Range    Protein, urine random 7 0.0 - 11.9 mg/dL    Creatinine, urine 13.20 mg/dL    Protein/Creat.  urine Ratio 0.5     GLUCOSE, POC    Collection Time: 01/29/22  9:18 PM   Result Value Ref Range    Glucose (POC) 88 65 - 117 mg/dL    Performed by Brigid GUNDERSON (CON)    GLUCOSE, POC    Collection Time: 01/30/22  6:42 AM   Result Value Ref Range    Glucose (POC) 94 65 - 117 mg/dL    Performed by Bruce Bob

## 2022-01-30 NOTE — PROGRESS NOTES
Reviewed patient with ROCIO, Nathaniel Weston and agree with management with anticipated MFM input/ scan tomorrow. Patient had been fully informed regarding betamethasone, Risks and benefits fully reviewed and all questions answered. Spoke again with couple regarding benefits and addressed her concerns once again.   Patient also consulted earlier with NICU  Now willing to proceed with BMZ x2  Continue close surveillance with reassuring fetal tracing today

## 2022-01-31 LAB
ALBUMIN SERPL-MCNC: 3 G/DL (ref 3.5–5)
ALBUMIN/GLOB SERPL: 0.8 {RATIO} (ref 1.1–2.2)
ALP SERPL-CCNC: 148 U/L (ref 45–117)
ALT SERPL-CCNC: 26 U/L (ref 12–78)
ANION GAP SERPL CALC-SCNC: 6 MMOL/L (ref 5–15)
AST SERPL-CCNC: 24 U/L (ref 15–37)
BILIRUB SERPL-MCNC: 0.4 MG/DL (ref 0.2–1)
BUN SERPL-MCNC: 7 MG/DL (ref 6–20)
BUN/CREAT SERPL: 11 (ref 12–20)
CALCIUM SERPL-MCNC: 9.6 MG/DL (ref 8.5–10.1)
CHLORIDE SERPL-SCNC: 106 MMOL/L (ref 97–108)
CO2 SERPL-SCNC: 23 MMOL/L (ref 21–32)
COVID-19 RAPID TEST, COVR: NOT DETECTED
CREAT SERPL-MCNC: 0.62 MG/DL (ref 0.55–1.02)
ERYTHROCYTE [DISTWIDTH] IN BLOOD BY AUTOMATED COUNT: 13.7 % (ref 11.5–14.5)
GLOBULIN SER CALC-MCNC: 3.9 G/DL (ref 2–4)
GLUCOSE SERPL-MCNC: 124 MG/DL (ref 65–100)
HCT VFR BLD AUTO: 31.6 % (ref 35–47)
HGB BLD-MCNC: 10 G/DL (ref 11.5–16)
MCH RBC QN AUTO: 26.2 PG (ref 26–34)
MCHC RBC AUTO-ENTMCNC: 31.6 G/DL (ref 30–36.5)
MCV RBC AUTO: 82.9 FL (ref 80–99)
NRBC # BLD: 0.03 K/UL (ref 0–0.01)
NRBC BLD-RTO: 0.2 PER 100 WBC
PLATELET # BLD AUTO: 334 K/UL (ref 150–400)
PMV BLD AUTO: 9.2 FL (ref 8.9–12.9)
POTASSIUM SERPL-SCNC: 4.1 MMOL/L (ref 3.5–5.1)
PROT SERPL-MCNC: 6.9 G/DL (ref 6.4–8.2)
RBC # BLD AUTO: 3.81 M/UL (ref 3.8–5.2)
SODIUM SERPL-SCNC: 135 MMOL/L (ref 136–145)
SOURCE, COVRS: NORMAL
URATE SERPL-MCNC: 5.2 MG/DL (ref 2.6–6)
WBC # BLD AUTO: 17.5 K/UL (ref 3.6–11)

## 2022-01-31 PROCEDURE — 74011250636 HC RX REV CODE- 250/636: Performed by: ADVANCED PRACTICE MIDWIFE

## 2022-01-31 PROCEDURE — 87635 SARS-COV-2 COVID-19 AMP PRB: CPT

## 2022-01-31 PROCEDURE — 74011250637 HC RX REV CODE- 250/637: Performed by: OBSTETRICS & GYNECOLOGY

## 2022-01-31 PROCEDURE — 75410000002 HC LABOR FEE PER 1 HR

## 2022-01-31 PROCEDURE — 74011250637 HC RX REV CODE- 250/637: Performed by: ADVANCED PRACTICE MIDWIFE

## 2022-01-31 PROCEDURE — 80053 COMPREHEN METABOLIC PANEL: CPT

## 2022-01-31 PROCEDURE — 85027 COMPLETE CBC AUTOMATED: CPT

## 2022-01-31 PROCEDURE — 76811 OB US DETAILED SNGL FETUS: CPT | Performed by: OBSTETRICS & GYNECOLOGY

## 2022-01-31 PROCEDURE — 84550 ASSAY OF BLOOD/URIC ACID: CPT

## 2022-01-31 PROCEDURE — 65270000029 HC RM PRIVATE

## 2022-01-31 RX ORDER — BUTORPHANOL TARTRATE 1 MG/ML
2 INJECTION INTRAMUSCULAR; INTRAVENOUS
Status: DISCONTINUED | OUTPATIENT
Start: 2022-01-31 | End: 2022-02-02 | Stop reason: HOSPADM

## 2022-01-31 RX ORDER — SODIUM CHLORIDE 0.9 % (FLUSH) 0.9 %
5-40 SYRINGE (ML) INJECTION EVERY 8 HOURS
Status: DISCONTINUED | OUTPATIENT
Start: 2022-01-31 | End: 2022-02-02 | Stop reason: HOSPADM

## 2022-01-31 RX ORDER — TERBUTALINE SULFATE 1 MG/ML
0.25 INJECTION SUBCUTANEOUS AS NEEDED
Status: DISCONTINUED | OUTPATIENT
Start: 2022-01-31 | End: 2022-02-02 | Stop reason: HOSPADM

## 2022-01-31 RX ORDER — ZOLPIDEM TARTRATE 5 MG/1
5 TABLET ORAL
Status: DISCONTINUED | OUTPATIENT
Start: 2022-01-31 | End: 2022-02-01

## 2022-01-31 RX ORDER — SODIUM CHLORIDE 0.9 % (FLUSH) 0.9 %
5-40 SYRINGE (ML) INJECTION AS NEEDED
Status: DISCONTINUED | OUTPATIENT
Start: 2022-01-31 | End: 2022-02-02 | Stop reason: HOSPADM

## 2022-01-31 RX ORDER — FENTANYL CITRATE 50 UG/ML
100 INJECTION, SOLUTION INTRAMUSCULAR; INTRAVENOUS
Status: DISCONTINUED | OUTPATIENT
Start: 2022-01-31 | End: 2022-02-02 | Stop reason: HOSPADM

## 2022-01-31 RX ADMIN — FERROUS SULFATE TAB 325 MG (65 MG ELEMENTAL FE) 325 MG: 325 (65 FE) TAB at 08:54

## 2022-01-31 RX ADMIN — ZOLPIDEM TARTRATE 5 MG: 5 TABLET ORAL at 22:27

## 2022-01-31 RX ADMIN — LABETALOL HYDROCHLORIDE 300 MG: 200 TABLET, FILM COATED ORAL at 21:21

## 2022-01-31 RX ADMIN — Medication 25 MCG: at 22:27

## 2022-01-31 RX ADMIN — Medication 1 TABLET: at 08:54

## 2022-01-31 RX ADMIN — LABETALOL HYDROCHLORIDE 300 MG: 200 TABLET, FILM COATED ORAL at 08:54

## 2022-01-31 RX ADMIN — Medication 25 MCG: at 18:18

## 2022-01-31 RX ADMIN — BETAMETHASONE SODIUM PHOSPHATE AND BETAMETHASONE ACETATE 12 MG: 3; 3 INJECTION, SUSPENSION INTRA-ARTICULAR; INTRALESIONAL; INTRAMUSCULAR at 10:35

## 2022-01-31 RX ADMIN — ACETAMINOPHEN 650 MG: 325 TABLET ORAL at 08:59

## 2022-01-31 NOTE — PROGRESS NOTES
High Risk Obstetrics Progress Note    Name: Yin Watkins MRN: [id number]  SSN: [id number]    YOB: 2000  Age: 24 y.o. Sex: female      Subjective:      LOS: 2 days    Estimated Date of Delivery: 3/6/22   Gestational Age Today: 35w1d     Patient admitted for chronic hypertension and preeclampsia. States she does not have abdominal pain  , chest pain, contractions, fever, nausea and vomiting, pelvic pressure, right upper quadrant pain  , shortness of breath, swelling, vaginal bleeding , vaginal leaking of fluid  and visual disturbances. C/O ,ild headache last night but resolved today    Pt to be seen by MFM today for direction on continued plan of care. Objective:     Vitals:  Blood pressure (!) 148/84, pulse (!) 109, temperature 97.8 °F (36.6 °C), resp. rate 17, SpO2 99 %. Temp (24hrs), Av.1 °F (36.7 °C), Min:97.8 °F (36.6 °C), Max:98.3 °F (13.4 °C)    Systolic (07ZKA), RATNA:067 , Min:137 , SZY:381      Diastolic (32WUS), QJU:74, Min:84, Max:105       Intake and Output:         Physical Exam:  Patient without distress. Heart: Regular rate   Lung: normal respiratory effort  Abdomen: soft, nontender  Fundus: soft and non tender  Lower Extremities: no GIDEON       Membranes:  Intact    Uterine Activity:  Infrequent     Fetal Heart Rate:  NST reactive last night         Labs:   Recent Results (from the past 36 hour(s))   GLUCOSE, POC    Collection Time: 22  6:42 AM   Result Value Ref Range    Glucose (POC) 94 65 - 117 mg/dL    Performed by Henry Silvestre    GLUCOSE, POC    Collection Time: 22 10:53 AM   Result Value Ref Range    Glucose (POC) 99 65 - 117 mg/dL    Performed by Farhad Vergara    COVID-19 RAPID TEST    Collection Time: 22  3:19 PM   Result Value Ref Range    Specimen source Nasopharyngeal      COVID-19 rapid test Not detected NOTD         Assessment and Plan:       Active Problems:    34 weeks gestation of pregnancy (2022)      Chronic hypertension with superimposed pre-eclampsia (1/29/2022)       CHTN with super imposed PreE  MFM consult - awaiting recommendations    Alessia Matos CNM

## 2022-01-31 NOTE — PROGRESS NOTES
Labor Progress Note    Dr. Carol Magaña pt admitted over the weekend for [facility] with superimposed PreE with increasing pressures. Pt has had spotty prenatal care this pregnancy and has been non complaint with her medication. Has followed with CNMs in office for  3 visits in the office since October. Review of MFM note, pt seen and discussed IOL and magnesium therapy-     Visit Vitals  BP (!) 148/84   Pulse (!) 109   Temp 97.8 °F (36.6 °C)   Resp 17   SpO2 99%       Physical Exam:  Cervical Exam:  FT/thick/-3  Membranes:  Intact  Uterine Activity: irritable  Fetal Heart Rate: Reactive  Baseline: 130 per minute  Variability: moderate  Accelerations: yes  Decelerations: none  Uterine irritability: yes    Assessment/Plan:  Reassuring fetal status, Continue plan for vaginal delivery     Cytotec 25 mcg po q 4 hours over night - reassess for potts cath placement as appropriate  Reviewed with pt in detail pt in agreement    Reviewed MFM recommendation with Carrie Han- both feel pt will need magnesium after delivery for 24 hours, plan to hold magnesium therapy at this time as pt has mild range pressures and asymptomatic at this time, mild headache last night that has resolved with a nap, food and without medication. If severe features or severe pressures magnesium therapy to be instituted ASAP-   Magnesium to continue or be started 24 hours post delivery. Pt in agreement at this time. Plan to sign pt out to [facility] for the evening.

## 2022-01-31 NOTE — CONSULTS
MATERNAL FETAL MEDICINE  INPATIENT CONSULTATION    REQUESTED BY: Gera Lowe MD   DATE OF CONSULT: 22    REASON FOR CONSULTATION: elevated blood pressures    Katerine Watson is a 24 y.o. female  at 35w1d. HPI  Yessenia York was diagnosed with chronic hypertension early in pregnancy. She was stated on labetalol but did not take her medication regularly. She came to L&D on Saturday for elevated blood pressures. She denies headache, vision changes, RUQ pain, nausea or vomiting. Patient Active Problem List   Diagnosis Code    Chronic headaches R51.9, G89.29    Encounter for supervision pregnancy in primigravida, antepartum Z34.00    34 weeks gestation of pregnancy Z3A.34    Chronic hypertension with superimposed pre-eclampsia O11.9       Past Medical History:   Diagnosis Date    Asthma     Depression     Eczema     Hypertension     Second hand smoke exposure     Vision decreased     glasses used, c/o vision problems       No past surgical history on file.     OB History    Para Term  AB Living   1 0 0 0 0 0   SAB IAB Ectopic Molar Multiple Live Births   0 0 0 0 0 0       No Known Allergies      Current Facility-Administered Medications:     labetaloL (NORMODYNE) tablet 300 mg, 300 mg, Oral, Q12H, Lisette Finder E, NP, 300 mg at 22 0854    lactated Ringers infusion, 999 mL/hr, IntraVENous, CONTINUOUS, Loreli Du, NP, Last Rate: 999 mL/hr at 22 1428, 999 mL/hr at 22 1428    sodium chloride (NS) flush 5-40 mL, 5-40 mL, IntraVENous, Q8H, Lisette Finder E, NP, 10 mL at 22 1336    sodium chloride (NS) flush 5-40 mL, 5-40 mL, IntraVENous, PRN, Loreli Du, NP    acetaminophen (TYLENOL) tablet 650 mg, 650 mg, Oral, Q4H PRN, Loreli Du, NP, 650 mg at 22 0859    ondansetron (ZOFRAN) injection 4 mg, 4 mg, IntraVENous, Q6H PRN, Loreli Du, NP    diphenhydrAMINE (BENADRYL) capsule 25 mg, 25 mg, Oral, QHS PRN, Loreli Du, NP  Verl Raw alum-mag hydroxide-simeth (MYLANTA) oral suspension 30 mL, 30 mL, Oral, Q4H PRN, Raven Calvert NP    prenatal vitamin tablet 1 Tablet, 1 Tablet, Oral, DAILY, Raven Calvert NP, 1 Tablet at 22 8688    ferrous sulfate tablet 325 mg, 1 Tablet, Oral, DAILY WITH BREAKFAST, Raven Calvert NP, 325 mg at 22 0854    sodium chloride (OCEAN) 0.65 % nasal squeeze bottle 2 Spray, 2 Spray, Both Nostrils, PRN, Raven Calvert NP    Social History     Tobacco Use    Smoking status: Never Smoker    Smokeless tobacco: Never Used   Substance Use Topics    Alcohol use: No    Drug use: No       Family History   Problem Relation Age of Onset    Hypertension Mother     Asthma Sister     Asthma Brother     Hypertension Maternal Uncle     Hypertension Maternal Grandmother     Asthma Maternal Grandmother     Cancer Other         breast cancer       Review of Systems   Constitutional: Negative. HENT: Negative. Eyes: Negative. Respiratory: Negative. Cardiovascular: Negative. Gastrointestinal: Negative. Genitourinary: Negative. Musculoskeletal: Negative. Skin: Negative. Neurological: Negative. Endo/Heme/Allergies: Negative. Psychiatric/Behavioral: Negative. Visit Vitals  BP (!) 144/96   Pulse 98   Temp 97.8 °F (36.6 °C)   Resp 17   SpO2 99%       Gen: Comfortable, NAD  Resp: Comfortable respirations  CV: Well perfused  Abd: Gravid, NT, ND  Ext: Moving all extremities well  Neuro: Alert, interactive, appropriate    Platelets normal  LFTs normal  24 hr urine protein 330 mg    ULTRASOUND:  Cephalic  9709 gms, 73YP percentile    ASSESSMENT:    24 y.o. female  at 35w1d with chronic hypertension and preeclampsia with severe features    Bethany Fagan has had several severe range blood pressures despite an increase in her chronic hypertensive. She has completed her  corticosteroid course.     Delivery is indicated for preeclampsia with severe features. PLAN:     Completed  corticosteroid course   Recommend proceeding with delivery   Recommend magnesium for maternal seizure prophylaxis   Fetal status reassuring   Spoke with Dr. Elissa Lenz    The patient visit was approximately 50 minutes with greater than half spent in face-to face counseling and coordination of care.     Wilma Currie MD  Maternal Fetal Medicine

## 2022-01-31 NOTE — ROUTINE PROCESS
Bedside and Verbal shift change report given to Cathleen Burns RN (oncoming nurse) by Ziyad Clayton (offgoing nurse). Report included the following information SBAR, Kardex, Intake/Output and MAR.     9808- [facility] called, no answer.

## 2022-01-31 NOTE — PROGRESS NOTES
2005 Bedside and Verbal shift change report given to Alannah RNC (oncoming nurse) by Guido Tesfaye RN (offgoing nurse). Report included the following information SBAR, Kardex, Intake/Output, MAR, Accordion, Recent Results and Med Rec Status. 2335 Bedside and Verbal shift change report given to [facility] (oncoming nurse) by Sarah Dawson RNC (offgoing nurse). Report included the following information SBAR, Kardex, Intake/Output, MAR, Accordion, Recent Results and Med Rec Status.

## 2022-01-31 NOTE — PROGRESS NOTES
1455- TRANSFER - IN REPORT:    Verbal report received from SHAHBAZ Alcantar on Zeb Wise  being received from [facility]) for routine progression of care      Report consisted of patients Situation, Background, Assessment and   Recommendations(SBAR). Information from the following report(s) SBAR was reviewed with the receiving nurse. Opportunity for questions and clarification was provided. Assessment completed upon patients arrival to unit and care assumed. 1500- Pt ambulated to bathroom. Oriented to room. Placed on monitors. [facility] at bedside. SVE FT/thick/-3, plan is to induce for PreE and give cytotec overnight and reassess in the am, pt will have postpartum magnesium. 1818- 25mcg of cytotec given buccally. 1945- Bedside and Verbal shift change report given to JAKE Mancilla RN (oncoming nurse) by Johanna Aragon. Gene HARTMANN & NISREEN Omer RN (offgoing nurse). Report included the following information SBAR.

## 2022-01-31 NOTE — PROGRESS NOTES
Per Katerine [place] called and pt is to be induced for [facility] with super imposed PreE-     Pt to be transferred to L&D for IOL. Transfer orders placed. Plan to start Misoprostil and attempt to place bulb this evening.      Bruno [place] CNM

## 2022-02-01 PROCEDURE — 74011250637 HC RX REV CODE- 250/637: Performed by: ADVANCED PRACTICE MIDWIFE

## 2022-02-01 PROCEDURE — 75410000000 HC DELIVERY VAGINAL/SINGLE

## 2022-02-01 PROCEDURE — 75410000002 HC LABOR FEE PER 1 HR

## 2022-02-01 PROCEDURE — 74011250636 HC RX REV CODE- 250/636: Performed by: ADVANCED PRACTICE MIDWIFE

## 2022-02-01 PROCEDURE — 74011000258 HC RX REV CODE- 258: Performed by: ADVANCED PRACTICE MIDWIFE

## 2022-02-01 PROCEDURE — 0UQMXZZ REPAIR VULVA, EXTERNAL APPROACH: ICD-10-PCS | Performed by: OBSTETRICS & GYNECOLOGY

## 2022-02-01 PROCEDURE — 59400 OBSTETRICAL CARE: CPT | Performed by: ADVANCED PRACTICE MIDWIFE

## 2022-02-01 PROCEDURE — S2140 CORD BLOOD HARVESTING: HCPCS | Performed by: ADVANCED PRACTICE MIDWIFE

## 2022-02-01 PROCEDURE — 0KQM0ZZ REPAIR PERINEUM MUSCLE, OPEN APPROACH: ICD-10-PCS | Performed by: OBSTETRICS & GYNECOLOGY

## 2022-02-01 PROCEDURE — 65270000029 HC RM PRIVATE

## 2022-02-01 PROCEDURE — 75410000003 HC RECOV DEL/VAG/CSECN EA 0.5 HR

## 2022-02-01 RX ORDER — NALOXONE HYDROCHLORIDE 0.4 MG/ML
0.4 INJECTION, SOLUTION INTRAMUSCULAR; INTRAVENOUS; SUBCUTANEOUS AS NEEDED
Status: DISCONTINUED | OUTPATIENT
Start: 2022-02-01 | End: 2022-02-04 | Stop reason: HOSPADM

## 2022-02-01 RX ORDER — IBUPROFEN 400 MG/1
800 TABLET ORAL EVERY 8 HOURS
Status: DISCONTINUED | OUTPATIENT
Start: 2022-02-01 | End: 2022-02-04 | Stop reason: HOSPADM

## 2022-02-01 RX ORDER — LIDOCAINE HYDROCHLORIDE 10 MG/ML
INJECTION INFILTRATION; PERINEURAL
Status: DISCONTINUED
Start: 2022-02-01 | End: 2022-02-01

## 2022-02-01 RX ORDER — HYDROCODONE BITARTRATE AND ACETAMINOPHEN 5; 325 MG/1; MG/1
1 TABLET ORAL
Status: DISCONTINUED | OUTPATIENT
Start: 2022-02-01 | End: 2022-02-04 | Stop reason: HOSPADM

## 2022-02-01 RX ORDER — SODIUM CHLORIDE 0.9 % (FLUSH) 0.9 %
5-40 SYRINGE (ML) INJECTION EVERY 8 HOURS
Status: DISCONTINUED | OUTPATIENT
Start: 2022-02-01 | End: 2022-02-04 | Stop reason: HOSPADM

## 2022-02-01 RX ORDER — ZOLPIDEM TARTRATE 5 MG/1
5 TABLET ORAL
Status: DISCONTINUED | OUTPATIENT
Start: 2022-02-01 | End: 2022-02-04 | Stop reason: HOSPADM

## 2022-02-01 RX ORDER — SODIUM CHLORIDE 0.9 % (FLUSH) 0.9 %
5-40 SYRINGE (ML) INJECTION AS NEEDED
Status: DISCONTINUED | OUTPATIENT
Start: 2022-02-01 | End: 2022-02-04 | Stop reason: HOSPADM

## 2022-02-01 RX ORDER — CALCIUM GLUCONATE 20 MG/ML
1 INJECTION, SOLUTION INTRAVENOUS ONCE
Status: DISPENSED | OUTPATIENT
Start: 2022-02-01 | End: 2022-02-02

## 2022-02-01 RX ORDER — OXYTOCIN/RINGER'S LACTATE 30/500 ML
0-20 PLASTIC BAG, INJECTION (ML) INTRAVENOUS
Status: DISCONTINUED | OUTPATIENT
Start: 2022-02-01 | End: 2022-02-01

## 2022-02-01 RX ORDER — OXYTOCIN/RINGER'S LACTATE 30/500 ML
87.3 PLASTIC BAG, INJECTION (ML) INTRAVENOUS AS NEEDED
Status: COMPLETED | OUTPATIENT
Start: 2022-02-01 | End: 2022-02-01

## 2022-02-01 RX ORDER — SODIUM CHLORIDE, SODIUM LACTATE, POTASSIUM CHLORIDE, CALCIUM CHLORIDE 600; 310; 30; 20 MG/100ML; MG/100ML; MG/100ML; MG/100ML
125 INJECTION, SOLUTION INTRAVENOUS CONTINUOUS
Status: DISCONTINUED | OUTPATIENT
Start: 2022-02-01 | End: 2022-02-04 | Stop reason: HOSPADM

## 2022-02-01 RX ORDER — OXYTOCIN/RINGER'S LACTATE 30/500 ML
87.3 PLASTIC BAG, INJECTION (ML) INTRAVENOUS AS NEEDED
Status: DISCONTINUED | OUTPATIENT
Start: 2022-02-01 | End: 2022-02-04 | Stop reason: HOSPADM

## 2022-02-01 RX ORDER — OXYTOCIN/RINGER'S LACTATE 30/500 ML
10 PLASTIC BAG, INJECTION (ML) INTRAVENOUS AS NEEDED
Status: DISCONTINUED | OUTPATIENT
Start: 2022-02-01 | End: 2022-02-04 | Stop reason: HOSPADM

## 2022-02-01 RX ORDER — MAGNESIUM SULFATE HEPTAHYDRATE 40 MG/ML
4 INJECTION, SOLUTION INTRAVENOUS ONCE
Status: COMPLETED | OUTPATIENT
Start: 2022-02-01 | End: 2022-02-01

## 2022-02-01 RX ORDER — ACETAMINOPHEN 325 MG/1
650 TABLET ORAL
Status: DISCONTINUED | OUTPATIENT
Start: 2022-02-01 | End: 2022-02-04 | Stop reason: HOSPADM

## 2022-02-01 RX ORDER — HYDROCORTISONE ACETATE PRAMOXINE HCL 2.5; 1 G/100G; G/100G
CREAM TOPICAL AS NEEDED
Status: DISCONTINUED | OUTPATIENT
Start: 2022-02-01 | End: 2022-02-04 | Stop reason: HOSPADM

## 2022-02-01 RX ADMIN — ACETAMINOPHEN 650 MG: 325 TABLET ORAL at 21:07

## 2022-02-01 RX ADMIN — MAGNESIUM SULFATE HEPTAHYDRATE 4 G: 40 INJECTION, SOLUTION INTRAVENOUS at 18:36

## 2022-02-01 RX ADMIN — OXYTOCIN 87.3 MILLI-UNITS/MIN: 10 INJECTION INTRAVENOUS at 18:00

## 2022-02-01 RX ADMIN — LABETALOL HYDROCHLORIDE 300 MG: 200 TABLET, FILM COATED ORAL at 21:07

## 2022-02-01 RX ADMIN — LABETALOL HYDROCHLORIDE 300 MG: 200 TABLET, FILM COATED ORAL at 08:58

## 2022-02-01 RX ADMIN — MAGNESIUM SULFATE HEPTAHYDRATE 2 G/HR: 500 INJECTION, SOLUTION INTRAMUSCULAR; INTRAVENOUS at 18:57

## 2022-02-01 RX ADMIN — Medication 1 TABLET: at 08:58

## 2022-02-01 RX ADMIN — Medication 25 MCG: at 02:35

## 2022-02-01 RX ADMIN — BUTORPHANOL TARTRATE 2 MG: 1 INJECTION, SOLUTION INTRAMUSCULAR; INTRAVENOUS at 15:30

## 2022-02-01 RX ADMIN — MAGNESIUM SULFATE HEPTAHYDRATE 2 G/HR: 500 INJECTION, SOLUTION INTRAMUSCULAR; INTRAVENOUS at 23:58

## 2022-02-01 RX ADMIN — FERROUS SULFATE TAB 325 MG (65 MG ELEMENTAL FE) 325 MG: 325 (65 FE) TAB at 08:58

## 2022-02-01 RX ADMIN — Medication 25 MCG: at 07:05

## 2022-02-01 RX ADMIN — OXYTOCIN 2 MILLI-UNITS/MIN: 10 INJECTION INTRAVENOUS at 10:56

## 2022-02-01 RX ADMIN — AMPICILLIN SODIUM 2 G: 2 INJECTION, POWDER, FOR SOLUTION INTRAVENOUS at 13:54

## 2022-02-01 NOTE — L&D DELIVERY NOTE
Delivery Summary  Became Complete and -2, with strong urge to push. Pushed for 2 pushes.  Live Male infant. Over small second degree vaginal and small naomi urethral tear that bleed significantly until repair was finished and pressure help. Infant placed to maternal abdomen. Infant dried and stimulated, spontaneous cry. Cord allowed to cease pulsations, then doubly clamped and cut per FOB. 3VC. Cordblood Obtained yes. Placenta and cord, spont afia, Intact. See QBL . All counts correct yes. To RR, Stable. Patient: Ken Rangel MRN: [id number]  SSN: [id number]    YOB: 2000  Age: 24 y.o. Sex: female       Information for the patient's : Prosper Vasquez [[id number]]       Labor Events:    Labor: Yes    Steroids: Full Course   Cervical Ripening Date/Time: 2022 6:18 PM   Cervical Ripening Type: Misoprostol; Alex/EASI   Antibiotics During Labor:     Rupture Identifier:      Rupture Date/Time:       Rupture Type:     Amniotic Fluid Volume:      Amniotic Fluid Description:      Amniotic Fluid Odor:      Induction: Oxytocin       Induction Date/Time: 2022 11:00 AM    Indications for Induction: Hypertension;Mild Preeclampsia;Chronic Hypertension    Augmentation:     Augmentation Date/Time:      Indications for Augmentation:     Labor complications:          Additional complications:        Delivery Events:  Indications For Episiotomy:     Episiotomy:     Perineal Laceration(s):     Repaired:     Periurethral Laceration Location:      Repaired:     Labial Laceration Location:     Repaired:     Sulcal Laceration Location:     Repaired:     Vaginal Laceration Location:     Repaired:     Cervical Laceration Location:     Repaired:     Repair Suture:     Number of Repair Packets:     Estimated Blood Loss (ml):  ml   Quantitative Blood Loss (ml)                Delivery Date: 2022    Delivery Time: 5:46 PM  Delivery Type:    Sex:  Male    Gestational Age: 32w1d Delivery Clinician:  Kourtney eRd  Living Status: Living   Delivery Location: L&D            APGARS  One minute Five minutes Ten minutes   Skin color: 0   1        Heart rate: 2   2        Grimace: 2   2        Muscle tone: 2   2        Breathin   2        Totals: 8   9            Presentation: Vertex    Position:        Resuscitation Method:  Suctioning-bulb; Tactile Stimulation     Meconium Stained: None      Cord Information:    Complications:    Cord around:    Delayed cord clamping? Yes  Cord clamped date/time:   Disposition of Cord Blood:      Blood Gases Sent?: No    Placenta:  Date/Time: 2022  5:59 PM  Removal: Spontaneous      Appearance:       [place] Measurements:  Birth Weight:        Birth Length:        Head Circumference:        Chest Circumference:       Abdominal Girth: Other Providers:   Jenny FORD;ONDINA VALDES KRISTI L, Obstetrician;Primary Nurse;Primary  Nurse;Nicu Nurse;Neonatologist;Anesthesiologist;Crna;Nurse Practitioner;Midwife;Nursery Nurse           Group B Strep: No results found for: GRBSEXT, GRBSEXT  Information for the patient's : Jayda Pires [[id number]]   No results found for: ABORH, PCTABR, PCTDIG, BILI, ABORHEXT, ABORH     No results for input(s): PCO2CB, PO2CB, HCO3I, SO2I, IBD, PTEMPI, SPECTI, PHICB, ISITE, IDEV, IALLEN in the last 72 hours.

## 2022-02-01 NOTE — PROGRESS NOTES
0730  Bedside and Verbal shift change report given to JOSE Saldaña RN (oncoming nurse) by SHAHBAZ Jauregui (offgoing nurse). Report included the following information SBAR, Kardex, MAR and Recent Results. Breakfast ordered, no c/o.  0800  CLARA Pulido CNM at bedside, sve 2/70/-2, cervical balloon placed, pt tolerated well. Pt up walking to bathroom. 0900  Monitor off to take shower. 1255  CLARA Pulido CNM at bedside, balloon removed with a tug, pt tolerated well.

## 2022-02-01 NOTE — PROGRESS NOTES
1940~  Bedside and Verbal shift change report given to VICENTE Velez RN (oncoming nurse) by Kathe Mederos RN (offgoing nurse). Report included the following information SBAR, MAR and Recent Results. 0735~  Bedside and Verbal shift change report given to [address] [place] [address] Road (oncoming nurse) by Danny Rice RN (offgoing nurse). Report included the following information SBAR, MAR and Recent Results .

## 2022-02-01 NOTE — PROGRESS NOTES
1530 - Bedside shift change report given to Fanny Wang RN (oncoming nurse) by Jonathon Osullivan RN (offgoing nurse). Report included the following information SBAR.  patient of Magaly Paloma ROCIO here for induction due to [facility]. Patient had 4x cytotec, aguirre bulb placed this morning, fell out at 1255. Patient on Pitocin. Receiving stadol during shift change. 1630 - Patient called out feeling leakage of fluid. SROM, Nitrazine positive. 1636 - Notified RAJESH Pulido CNM of SROM, patient up to [facility]    1640 - SVE 6.5/80/0. RN remained at bedside, patient requesting squat bar.     1650 - RN remained at bedside, adjusting FHR monitors. Patient ambulating     [phone]- RN removed TOCO and belt  At patient's request, RN remained at bedside palpating contractions. Dat FELIXM at bedside to assess progress. SVE 7/90/0.    1715 - RN remained at bedside, adjusting FHR monitors. Ana at bedside, Verbal order received to turn off pitocin. SVE 9/100/0    1747 -  of LIVE baby BOY. [address] 2g/hr Mag Maintenance dose    1930 - Bedside shift change report given to Dulce Sarkar RN (oncoming nurse) by Fanny Wang RN (offgoing nurse). Report included the following information SBAR.

## 2022-02-02 PROCEDURE — 65410000002 HC RM PRIVATE OB

## 2022-02-02 PROCEDURE — 74011000258 HC RX REV CODE- 258: Performed by: ADVANCED PRACTICE MIDWIFE

## 2022-02-02 PROCEDURE — 74011250637 HC RX REV CODE- 250/637: Performed by: ADVANCED PRACTICE MIDWIFE

## 2022-02-02 PROCEDURE — 74011250636 HC RX REV CODE- 250/636: Performed by: ADVANCED PRACTICE MIDWIFE

## 2022-02-02 RX ORDER — LABETALOL 100 MG/1
100 TABLET, FILM COATED ORAL EVERY 12 HOURS
Qty: 90 TABLET | Refills: 1 | Status: SHIPPED | OUTPATIENT
Start: 2022-02-02 | End: 2022-02-03

## 2022-02-02 RX ORDER — LABETALOL 100 MG/1
100 TABLET, FILM COATED ORAL EVERY 12 HOURS
Status: DISCONTINUED | OUTPATIENT
Start: 2022-02-02 | End: 2022-02-03

## 2022-02-02 RX ADMIN — IBUPROFEN 800 MG: 400 TABLET, FILM COATED ORAL at 09:25

## 2022-02-02 RX ADMIN — SODIUM CHLORIDE, POTASSIUM CHLORIDE, SODIUM LACTATE AND CALCIUM CHLORIDE 75 ML/HR: 600; 310; 30; 20 INJECTION, SOLUTION INTRAVENOUS at 04:44

## 2022-02-02 RX ADMIN — MAGNESIUM SULFATE HEPTAHYDRATE 2 G/HR: 500 INJECTION, SOLUTION INTRAMUSCULAR; INTRAVENOUS at 10:05

## 2022-02-02 RX ADMIN — IBUPROFEN 800 MG: 400 TABLET, FILM COATED ORAL at 17:10

## 2022-02-02 RX ADMIN — MAGNESIUM SULFATE HEPTAHYDRATE 2 G/HR: 500 INJECTION, SOLUTION INTRAMUSCULAR; INTRAVENOUS at 15:04

## 2022-02-02 RX ADMIN — MAGNESIUM SULFATE HEPTAHYDRATE 2 G/HR: 500 INJECTION, SOLUTION INTRAMUSCULAR; INTRAVENOUS at 04:44

## 2022-02-02 RX ADMIN — LABETALOL HYDROCHLORIDE 100 MG: 100 TABLET, FILM COATED ORAL at 21:17

## 2022-02-02 RX ADMIN — LABETALOL HYDROCHLORIDE 100 MG: 100 TABLET, FILM COATED ORAL at 09:25

## 2022-02-02 NOTE — PROGRESS NOTES
0730 Bedside and Verbal shift change report given to JOSE Saldaña RN (oncoming nurse) by SHAHBAZ Jauregui (offgoing nurse). Report included the following information SBAR, Kardex, MAR and Recent Results. 1515  Verbal report given to NIKI NickRN

## 2022-02-02 NOTE — PROGRESS NOTES
Postpartum Note  S/P , PPday #1  Ambulating and voiding without diff  Aiden po and po meds well  Pt denies S/Sx worsening disease     The risks and benefits of the circumcision  procedure and anesthesia including: bleeding, infection, variability of cosmetic results were discussed at length with the mother. She is aware that future repeat procedures may be necessary. She gives informed consent to proceed as noted and her questions are answered. Consent obtained and Lidocaine orders entered.        Desires Circ- Pediatrician will assess due to gestation if this should be done out patient     O: VSS, Afebrile       Patient Vitals for the past 24 hrs:   Temp Pulse Resp BP SpO2   22 0700  (!) 101  (!) 166/72    22 0657     97 %   22 0600  96  116/72    22 0557     98 %   22 0500  92  137/82    22 0457     98 %   22 0406  93  138/84    22 0402     97 %   22 0300  83  136/76    22 0257     97 %   22 0200  100  125/64    22 0157     98 %   22 0100  (!) 104  136/77    22 0057     98 %   22 0000  94  110/63    22 2357     97 %   22 2305  96  (!) 112/55    22 2302     96 %   22 2203  (!) 106  122/71    22 2102     97 %   22 2100  99  137/77    22 2002     97 %   22  (!) 105  (!) 152/75    22 1946  (!) 106  (!) 153/84    22 193  (!) 105  (!) 150/83 (!) 89 %   22 1917  100  (!) 158/88 96 %   22 1901  100 16 (!) 150/86 98 %   22 1852  (!) 112  (!) 143/83 98 %   22 1846  (!) 113  (!) 140/82 97 %   22 1841  (!) 101  (!) 149/84 97 %   22 1829  (!) 101  (!) 150/86    22 1821  (!) 103  (!) 153/89    22 1816  100  (!) 147/89    22 1810  90  (!) 145/89    22 1804 98 °F (36.7 °C) 88 18 139/82 99 %   22 1530 98.4 °F (36.9 °C) 85 16 138/82 99 %   02/01/22 1425  91  135/80    02/01/22 1255  77 18 (!) 153/91    02/01/22 1200 97.7 °F (36.5 °C) 87  137/85    02/01/22 1052  92  (!) 143/78    02/01/22 0901 97.8 °F (36.6 °C) (!) 110 20 (!) 144/95            Breasts soft, NT        FF @ U-1 ML, Lochia Small Rubra        Perineum Intact        Ext NT, No REP, Neg Lorne's    A/P: Contuinue PP magnesium therapy x 24 hours post delivery   Restart PO labetalol - start with 100 mg BID and reassess -   Routine PP care          Maternal Education          Lactation consultation kandy Cano CNM

## 2022-02-02 NOTE — LACTATION NOTE
This note was copied from a baby's chart. Initial Lactation Consultation: Infant born vaginally last evening to a  mom at 28 2/7 weeks gestation. Mom has a history of hypertension and is on mag. She noted breast changes during the pregnancy and has copious amounts of colostrum, which is blood tinged (kori pipe syndrome ?). During the night, infant received formula, per moms choice. I demonstrated wake up techniques to mom and dad and assisted with latching infant in the prone position. Deep latch obtained and infant nursed for 15 minutes with consistent sucks and occasional swallows noted. Following nursing, hand expression yielded 3 ml from the left and 4 from the right breast. Syringe fed 3 ml to infant following nursing session and saving the 4 ml for the next feeding. Reviewed effects/risks of late  birth on initiation of breastfeeding including infant's sleepiness, ineffective or missed breastfeedings, infant's decreased stamina to sustain prolonged latch and effective breastfeeding, decreased energy reserves related to low birth wt and inability to stimulate milk supply. Recommended interventions include skin to skin bonding at breast, hand expression of colostrum as infant rests at breast and initiation of breastfeeding on demand as infant is able, initiation of pumping regimen as mom is able; complement/supplement feeding if medically indicated and ordered by pediatrician. Provided mom with a breast pump with instructions for use. She will pump following nursing sessions as she is able and will hand express.

## 2022-02-02 NOTE — PROGRESS NOTES
1600 Pt rec'd resting comfortably in bed bonding with infant. Bedside report rec'd from Gael Carranza RN. No complaints at this time. [address] per orders. Pt resting in bed comfortably. Denies HA, blurry vision, SOB, or swelling. VSS. 1940 Bedside report given to Samreen Hooker RN. Infant and family  At bedside. Pt denies any pain, dizziness, or epigastric pain.

## 2022-02-02 NOTE — PROGRESS NOTES
1940~  Bedside and Verbal shift change report given to Patti Rivas RN (oncoming nurse) by Jocelyn Garzon RN (offgoing nurse). Report included the following information SBAR, MAR and Recent Results. 0730~  Bedside and Verbal shift change report given to [address] [place] [address] Road (oncoming nurse) by Patti Rivas RN (offgoing nurse). Report included the following information SBAR, Intake/Output, MAR and Recent Results.

## 2022-02-03 LAB
ALBUMIN SERPL-MCNC: 2.6 G/DL (ref 3.5–5)
ALBUMIN/GLOB SERPL: 0.7 {RATIO} (ref 1.1–2.2)
ALP SERPL-CCNC: 116 U/L (ref 45–117)
ALT SERPL-CCNC: 35 U/L (ref 12–78)
ANION GAP SERPL CALC-SCNC: 4 MMOL/L (ref 5–15)
AST SERPL-CCNC: 33 U/L (ref 15–37)
BILIRUB SERPL-MCNC: 0.3 MG/DL (ref 0.2–1)
BUN SERPL-MCNC: 10 MG/DL (ref 6–20)
BUN/CREAT SERPL: 15 (ref 12–20)
CALCIUM SERPL-MCNC: 8.8 MG/DL (ref 8.5–10.1)
CHLORIDE SERPL-SCNC: 106 MMOL/L (ref 97–108)
CO2 SERPL-SCNC: 28 MMOL/L (ref 21–32)
CREAT SERPL-MCNC: 0.68 MG/DL (ref 0.55–1.02)
ERYTHROCYTE [DISTWIDTH] IN BLOOD BY AUTOMATED COUNT: 14.2 % (ref 11.5–14.5)
GLOBULIN SER CALC-MCNC: 3.9 G/DL (ref 2–4)
GLUCOSE SERPL-MCNC: 80 MG/DL (ref 65–100)
HCT VFR BLD AUTO: 25.8 % (ref 35–47)
HGB BLD-MCNC: 8 G/DL (ref 11.5–16)
MCH RBC QN AUTO: 26.1 PG (ref 26–34)
MCHC RBC AUTO-ENTMCNC: 31 G/DL (ref 30–36.5)
MCV RBC AUTO: 84 FL (ref 80–99)
NRBC # BLD: 0.02 K/UL (ref 0–0.01)
NRBC BLD-RTO: 0.1 PER 100 WBC
PLATELET # BLD AUTO: 318 K/UL (ref 150–400)
PMV BLD AUTO: 9.1 FL (ref 8.9–12.9)
POTASSIUM SERPL-SCNC: 4.5 MMOL/L (ref 3.5–5.1)
PROT SERPL-MCNC: 6.5 G/DL (ref 6.4–8.2)
RBC # BLD AUTO: 3.07 M/UL (ref 3.8–5.2)
SODIUM SERPL-SCNC: 138 MMOL/L (ref 136–145)
URATE SERPL-MCNC: 6.8 MG/DL (ref 2.6–6)
WBC # BLD AUTO: 16 K/UL (ref 3.6–11)

## 2022-02-03 PROCEDURE — 85027 COMPLETE CBC AUTOMATED: CPT

## 2022-02-03 PROCEDURE — 74011250637 HC RX REV CODE- 250/637: Performed by: MIDWIFE

## 2022-02-03 PROCEDURE — 84550 ASSAY OF BLOOD/URIC ACID: CPT

## 2022-02-03 PROCEDURE — 80053 COMPREHEN METABOLIC PANEL: CPT

## 2022-02-03 PROCEDURE — 74011250637 HC RX REV CODE- 250/637: Performed by: ADVANCED PRACTICE MIDWIFE

## 2022-02-03 PROCEDURE — 65410000002 HC RM PRIVATE OB

## 2022-02-03 PROCEDURE — 36415 COLL VENOUS BLD VENIPUNCTURE: CPT

## 2022-02-03 PROCEDURE — 74011250637 HC RX REV CODE- 250/637: Performed by: OBSTETRICS & GYNECOLOGY

## 2022-02-03 RX ORDER — LABETALOL 200 MG/1
200 TABLET, FILM COATED ORAL 2 TIMES DAILY
Qty: 60 TABLET | Refills: 1 | Status: SHIPPED | OUTPATIENT
Start: 2022-02-03

## 2022-02-03 RX ORDER — LABETALOL 200 MG/1
200 TABLET, FILM COATED ORAL EVERY 12 HOURS
Status: DISCONTINUED | OUTPATIENT
Start: 2022-02-03 | End: 2022-02-04 | Stop reason: HOSPADM

## 2022-02-03 RX ORDER — LABETALOL 200 MG/1
200 TABLET, FILM COATED ORAL 2 TIMES DAILY
Status: DISCONTINUED | OUTPATIENT
Start: 2022-02-04 | End: 2022-02-03

## 2022-02-03 RX ORDER — LABETALOL 100 MG/1
100 TABLET, FILM COATED ORAL ONCE
Status: COMPLETED | OUTPATIENT
Start: 2022-02-03 | End: 2022-02-03

## 2022-02-03 RX ORDER — LABETALOL 200 MG/1
200 TABLET, FILM COATED ORAL 3 TIMES DAILY
Status: DISCONTINUED | OUTPATIENT
Start: 2022-02-03 | End: 2022-02-03

## 2022-02-03 RX ORDER — FUROSEMIDE 20 MG/1
20 TABLET ORAL DAILY
Status: DISCONTINUED | OUTPATIENT
Start: 2022-02-03 | End: 2022-02-04 | Stop reason: HOSPADM

## 2022-02-03 RX ADMIN — LABETALOL HYDROCHLORIDE 200 MG: 200 TABLET, FILM COATED ORAL at 20:26

## 2022-02-03 RX ADMIN — IBUPROFEN 800 MG: 400 TABLET, FILM COATED ORAL at 17:27

## 2022-02-03 RX ADMIN — LABETALOL HYDROCHLORIDE 100 MG: 100 TABLET, FILM COATED ORAL at 09:44

## 2022-02-03 RX ADMIN — IBUPROFEN 800 MG: 400 TABLET, FILM COATED ORAL at 01:28

## 2022-02-03 RX ADMIN — FUROSEMIDE 20 MG: 20 TABLET ORAL at 14:19

## 2022-02-03 RX ADMIN — LABETALOL HYDROCHLORIDE 100 MG: 100 TABLET, FILM COATED ORAL at 08:58

## 2022-02-03 RX ADMIN — IBUPROFEN 800 MG: 400 TABLET, FILM COATED ORAL at 08:58

## 2022-02-03 NOTE — PROGRESS NOTES
Asked to review BPs and overall clinical setting of 22yo G1 known to be somewhat noncompliant with  baby now with persistently borderline BPs    Will change labetalol schedule 200mg TID and 20mg lasix po now and in am  anticipate discharge home in am

## 2022-02-03 NOTE — ROUTINE PROCESS
1600- Bedside shift change report given to MARLEY Aparicio, RN (oncoming nurse) by Sarwat Cai RN (offgoing nurse). Report included the following information SBAR.

## 2022-02-03 NOTE — PROGRESS NOTES
Bedside and Verbal shift change report given to Evie Carvajal (oncoming nurse) by Afsaneh Rucker RN (offgoing nurse). Report included the following information SBAR.     9334: Pts BP: 132/62, Lasix given. Nurse called Midwife to see if she would like nurse to give Labetalol, awaiting callback. 1443: Midwife would like nurse to hold Labetalol at this time, CNM will check Labetalol to 2 times a day.

## 2022-02-03 NOTE — LACTATION NOTE
This note was copied from a baby's chart. Mom states baby latched and breast fed for 10 minutes at the last feeding and she was hearing him swallow at the breast. She has a pump at the bedside and she may pump and offer breast milk in the bottle.

## 2022-02-03 NOTE — DISCHARGE SUMMARY
Obstetrical Discharge Summary     Name: Reid Chowdhury MRN: [id number]  SSN: [id number]    YOB: 2000  Age: 24 y.o. Sex: female      Allergies: Patient has no known allergies. Admit Date: 2022    Discharge Date: 2022     Admitting Physician: Elysia Gutierrez MD     Attending Physician:  Sona Ann MD     * Admission Diagnoses: Chronic hypertension with superimposed pre-eclampsia [O11.9]  34 weeks gestation of pregnancy [Z3A.34]    * Discharge Diagnoses:   Information for the patient's : David Reynolds [[id number]]   Delivery of a 5 lb 5.7 oz (2.43 kg) male infant via  on 2022 at 5:46 PM  by Jami Walker. Apgars were 8  and 9 . Additional Diagnoses:   Hospital Problems as of 2022 Date Reviewed: 1/3/2022          Codes Class Noted - Resolved POA    Chronic hypertension with superimposed pre-eclampsia ICD-10-CM: O11.9  ICD-9-CM: 642.70  2022 - Present Unknown             Lab Results   Component Value Date/Time    ABO/Rh(D) O POSITIVE 2021 09:40 AM    Rubella, External immune 2021 12:00 AM    ABO,Rh O positive  2021 12:00 AM    There is no immunization history for the selected administration types on file for this patient. * Procedures:   * No surgery found *           * Discharge Condition: good    [facility] Course: Normal hospital course following the delivery. * Disposition: Home    Discharge Medications:   Current Discharge Medication List          * Follow-up Care/Patient Instructions:   Activity: Activity as tolerated  Diet: Regular Diet  Wound Care: None needed    Follow-up Information    None            Jami Walker CNM

## 2022-02-03 NOTE — PROGRESS NOTES
Postpartum Note  S/P , PPday #1  Ambulating and voiding without diff  Aiden po and po meds well  Working on breast feeding    Pediatrician cleared infant for circ pt desires  The risks and benefits of the circumcision  procedure and anesthesia including: bleeding, infection, variability of cosmetic results were discussed at length with the mother. She is aware that future repeat procedures may be necessary. She gives informed consent to proceed as noted and her questions are answered. Consent obtained and Lidocaine orders entered.      If pressures continue to be normotensive to mild range and controlled with PO medication consider d/c home tomorrow evening with office follow up within 2 weeks for blood pressure check     Repeat labs in am    O: VSS, Afebrile       Patient Vitals for the past 24 hrs:   Temp Pulse Resp BP SpO2   22 1829  99  133/79    22 1744  94  (!) 141/89    22 1606 98.2 °F (36.8 °C) (!) 103  133/72    22 1458  99 16 138/76 99 %   22 1357  90 16 137/85 97 %   22 1301  98 15 (!) 142/63 98 %   22 1200 97.9 °F (36.6 °C) 98 16 136/80 98 %   22 1100  99 16 134/76 96 %   22 1000  97 16 137/81 97 %   22 0924  94  (!) 140/76 97 %   22 0900  96 16 (!) 138/101 99 %   22 0800 97.8 °F (36.6 °C) 90 16 134/82 97 %   22 0700  (!) 101  (!) 166/72    22 0657     97 %   22 0600  96  116/72    22 0557     98 %   22 0500  92  137/82    22 0457     98 %   22 0406  93  138/84    22 0402     97 %   22 0300  83  136/76    22 0257     97 %   22 0200  100  125/64    22 0157     98 %   22 0100  (!) 104  136/77    22 0057     98 %   22 0000  94  110/63    22 2357     97 %   22 2305  96  (!) 112/55    22 2302     96 %   22 2203  (!) 106  122/71    22     97 %   02/01/22 2100  99  137/77    02/01/22 2002     97 %   02/01/22 2000  (!) 105  (!) 152/75    02/01/22 1946  (!) 106  (!) 153/84    02/01/22 1931  (!) 105  (!) 150/83 (!) 89 %           Breasts soft, NT        FF @ U-1 ML, Lochia Small Rubra        Perineum Intact        Ext NT, No REP, Neg Lorne's    A/P: Routine PP care          Maternal Education          Lactation consultation prn          Plan Discharge in am

## 2022-02-03 NOTE — PROGRESS NOTES
Post-Partum Day Number 2 Progress/Discharge Note    Patient doing well post-partum without significant complaint. She is voiding without difficulty, she reports normal lochia. She is ambulatiing without dizziness. Her pain is well controlled with oral pain medication. She is tolerating general diet. Vitals:  Patient Vitals for the past 8 hrs:   BP Temp Pulse Resp SpO2   22 0813 (!) 145/97       22 0417 127/82 98.6 °F (37 °C) 88 16 100 %     Temp (24hrs), Av.2 °F (36.8 °C), Min:97.9 °F (36.6 °C), Max:98.6 °F (37 °C)        Exam:  Patient without distress. Abdomen soft, fundus firm at level of umbilicus, nontender               Lower extremities are negative for cords or tenderness; no swelling. Labs: No results found for this or any previous visit (from the past 24 hour(s)). Lab Results   Component Value Date/Time    Rubella, External immune 2021 12:00 AM    HBsAg, External negative 2021 12:00 AM    Gonorrhea, External negative 2021 12:00 AM    Chlamydia, External negative 2021 12:00 AM       Assessment and Plan:    Postpartum Day #2, S/P . Doing well.    - d/c home   - F/U 2 wk postpartum check, sooner prn  -increase labetalol to 200mg BID

## 2022-02-03 NOTE — LACTATION NOTE
This note was copied from a baby's chart. Mother is pumping for very small baby who has had difficulty latching. Mother is engorged. Pumping and engorgement management was focus of consult. Infant eagerly able to take EBM via bottle with good stamina. Mother given ice packs to apply to breasts after pumping. Breast massage during pump helped to decrease engorgement. One breast is still having brown blood tinged milk . Mother reports baby tends to vomit when given this milk, so she is dumping it for now,  Mother has more than enough from other side to meet baby's needs. She is pumping 4 ounces or more per session.

## 2022-02-04 VITALS
HEIGHT: 60 IN | OXYGEN SATURATION: 98 % | WEIGHT: 167 LBS | SYSTOLIC BLOOD PRESSURE: 138 MMHG | RESPIRATION RATE: 16 BRPM | DIASTOLIC BLOOD PRESSURE: 88 MMHG | HEART RATE: 96 BPM | BODY MASS INDEX: 32.79 KG/M2 | TEMPERATURE: 99.5 F

## 2022-02-04 PROCEDURE — 74011250637 HC RX REV CODE- 250/637: Performed by: MIDWIFE

## 2022-02-04 PROCEDURE — 74011250637 HC RX REV CODE- 250/637: Performed by: OBSTETRICS & GYNECOLOGY

## 2022-02-04 PROCEDURE — 74011250637 HC RX REV CODE- 250/637: Performed by: ADVANCED PRACTICE MIDWIFE

## 2022-02-04 RX ADMIN — FUROSEMIDE 20 MG: 20 TABLET ORAL at 08:52

## 2022-02-04 RX ADMIN — LABETALOL HYDROCHLORIDE 200 MG: 200 TABLET, FILM COATED ORAL at 08:52

## 2022-02-04 RX ADMIN — IBUPROFEN 800 MG: 400 TABLET, FILM COATED ORAL at 02:46

## 2022-02-04 RX ADMIN — IBUPROFEN 800 MG: 400 TABLET, FILM COATED ORAL at 11:49

## 2022-02-04 NOTE — LACTATION NOTE
This note was copied from a baby's chart. Mom is providing EBM to infant, voiding and stools present as appropriate since birth. Weight loss:  4.9%   Mom does not have a pump at home and is not able to rent one at this time. I provided mom with a hand pump. She will continue to pump to express milk and will contact her insurance company to obtain a pump. Breasts may become engorged when milk \"comes in\". How milk is made / normal phases of milk production, supply and demand discussed. Taught care of engorged breasts - frequent breastfeeding encouraged and breast massage ac. Then nurse the baby (or pump minimally for comfort). Apply cold compresses ac and/or pc x 15 minutes a few times a day for swelling or discomfort. May need to do this care for a couple of days. Discussed prevention and treatment of mastitis.

## 2022-02-04 NOTE — DISCHARGE SUMMARY
Obstetrical Discharge Summary     Name: Caron Dumont MRN: [id number]  SSN: [id number]    YOB: 2000  Age: 24 y.o. Sex: female      Allergies: Patient has no known allergies. Admit Date: 2022    Discharge Date: 2022     Admitting Physician: Alysia Snellen, MD     Attending Physician:  Theresa Bull MD     * Admission Diagnoses: Chronic hypertension with superimposed pre-eclampsia [O11.9]  34 weeks gestation of pregnancy [Z3A.34]    * Discharge Diagnoses:   Information for the patient's : Debby Mirza [[id number]]   Delivery of a 5 lb 5.7 oz (2.43 kg) male infant via  on 2022 at 5:46 PM  by Brooklyn Ernandez. Apgars were 8  and 9 . Additional Diagnoses:   Hospital Problems as of 2022 Date Reviewed: 1/3/2022          Codes Class Noted - Resolved POA    Chronic hypertension with superimposed pre-eclampsia ICD-10-CM: O11.9  ICD-9-CM: 642.70  2022 - Present Unknown             Lab Results   Component Value Date/Time    ABO/Rh(D) O POSITIVE 2021 09:40 AM    Rubella, External immune 2021 12:00 AM    ABO,Rh O positive  2021 12:00 AM    There is no immunization history for the selected administration types on file for this patient. * Procedures:  of LMI  * No surgery found *           * Discharge Condition: good    [facility] Course: Normal hospital course following the delivery. * Disposition: Home    Discharge Medications:   Current Discharge Medication List      CONTINUE these medications which have CHANGED    Details   labetaloL (NORMODYNE) 200 mg tablet Take 1 Tablet by mouth two (2) times a day.   Qty: 60 Tablet, Refills: 1  Start date: 2/3/2022         STOP taking these medications       prenatal multivit-ca-min-fe-fa (Prenatal Vitamin) tab Comments:   Reason for Stopping:         aspirin delayed-release 81 mg tablet Comments:   Reason for Stopping:         Blood-Glucose Meter misc Comments:   Reason for Stopping: glucose blood VI test strips (blood glucose test) strip Comments:   Reason for Stopping:         lancets misc Comments:   Reason for Stopping:               * Follow-up Care/Patient Instructions:   Activity: Activity as tolerated, No sex for 6 weeks and No heavy lifting for 6 weeks  Diet: Regular Diet  Wound Care: Keep wound clean and dry    Follow-up Information     Follow up With Specialties Details Why Contact Info    Unknown, Provider, DPM Podiatry   Patient not available to ask         Follow up in 1 week for BP check

## 2022-02-04 NOTE — PROGRESS NOTES
Post-Partum Day Number 2 Progress/Discharge Note    Patient doing well post-partum without significant complaint. She is voiding without difficulty, she reports normal lochia. She is ambulatiing without dizziness. Her pain is well controlled with oral pain medication. She is tolerating general diet. Vitals:  Patient Vitals for the past 8 hrs:   BP Temp Pulse Resp SpO2   22 0242 138/84       22 0240 (!) 141/89       22 0238 (!) 150/80 97.9 °F (36.6 °C) 90 16 98 %     Temp (24hrs), Av.6 °F (37 °C), Min:97.9 °F (36.6 °C), Max:98.9 °F (37.2 °C)        Exam:  Patient without distress. Abdomen soft, fundus firm at level of umbilicus, nontender               Lower extremities are negative for cords or tenderness; no swelling. Labs:   Recent Results (from the past 24 hour(s))   CBC W/O DIFF    Collection Time: 22  5:13 PM   Result Value Ref Range    WBC 16.0 (H) 3.6 - 11.0 K/uL    RBC 3.07 (L) 3.80 - 5.20 M/uL    HGB 8.0 (L) 11.5 - 16.0 g/dL    HCT 25.8 (L) 35.0 - 47.0 %    MCV 84.0 80.0 - 99.0 FL    MCH 26.1 26.0 - 34.0 PG    MCHC 31.0 30.0 - 36.5 g/dL    RDW 14.2 11.5 - 14.5 %    PLATELET 603 476 - 322 K/uL    MPV 9.1 8.9 - 12.9 FL    NRBC 0.1 (H) 0  WBC    ABSOLUTE NRBC 0.02 (H) 0.00 - 5.91 K/uL   METABOLIC PANEL, COMPREHENSIVE    Collection Time: 22  5:13 PM   Result Value Ref Range    Sodium 138 136 - 145 mmol/L    Potassium 4.5 3.5 - 5.1 mmol/L    Chloride 106 97 - 108 mmol/L    CO2 28 21 - 32 mmol/L    Anion gap 4 (L) 5 - 15 mmol/L    Glucose 80 65 - 100 mg/dL    BUN 10 6 - 20 MG/DL    Creatinine 0.68 0.55 - 1.02 MG/DL    BUN/Creatinine ratio 15 12 - 20      GFR est AA >60 >60 ml/min/1.73m2    GFR est non-AA >60 >60 ml/min/1.73m2    Calcium 8.8 8.5 - 10.1 MG/DL    Bilirubin, total 0.3 0.2 - 1.0 MG/DL    ALT (SGPT) 35 12 - 78 U/L    AST (SGOT) 33 15 - 37 U/L    Alk.  phosphatase 116 45 - 117 U/L    Protein, total 6.5 6.4 - 8.2 g/dL    Albumin 2.6 (L) 3.5 - 5.0 g/dL    Globulin 3.9 2.0 - 4.0 g/dL    A-G Ratio 0.7 (L) 1.1 - 2.2     URIC ACID    Collection Time: 22  5:14 PM   Result Value Ref Range    Uric acid 6.8 (H) 2.6 - 6.0 MG/DL       Lab Results   Component Value Date/Time    Rubella, External immune 2021 12:00 AM    HBsAg, External negative 2021 12:00 AM    Gonorrhea, External negative 2021 12:00 AM    Chlamydia, External negative 2021 12:00 AM       Assessment and Plan:    Postpartum Day #2, S/P . BP better controlled and trending down on labetalol 200mg bid.     She has a cuff at home and is reliable to check BPs  Call if consistently over 140/90 or severe range 160/100  Return next week for BP check   - d/c home   -

## 2022-02-04 NOTE — ROUTINE PROCESS
0744 Bedside shift change report given to diomedes Chavez RN (oncoming nurse) by Myriam Beckford. Allen Villegas RN (offgoing nurse). Report included the following information SBAR.     4080 I have reviewed discharge instructions with the patient. The patient verbalized understanding.

## 2022-02-04 NOTE — PROGRESS NOTES
Bedside and Verbal shift change report given to MARLEY Souza RN (oncoming nurse) by Joellen Mei. Trish Rubinstein, RN (offgoing nurse). Report included the following information SBAR. 2250: RN entered room and found mom asleep with baby in arms. RN educated mom on safe sleep and the importance of laying baby in bassinet if she is feeling sleepy.     Yao Alvarez RN

## 2022-02-04 NOTE — DISCHARGE INSTRUCTIONS
POSTPARTUM DISCHARGE INSTRUCTIONS       Name:  Nunu Hearn  YOB: 2000  Admission Diagnosis:  Chronic hypertension with superimposed pre-eclampsia [O11.9]  34 weeks gestation of pregnancy [Z3A.34]     Discharge Diagnosis:    Problem List as of 2/4/2022 Date Reviewed: 1/3/2022          Codes Class Noted - Resolved    Chronic hypertension with superimposed pre-eclampsia ICD-10-CM: O11.9  ICD-9-CM: 642.70  1/29/2022 - Present        Encounter for supervision pregnancy in primigravida, antepartum ICD-10-CM: Z34.00  ICD-9-CM: V22.0  9/2/2021 - Present    Overview Addendum 1/2/2022  9:50 AM by Gasper Conti     Primary Provider: Alem Perrin  EDC by Oj Martinez 49    Pregnancy problems:  Hypertension: hereditary, predates pregnancy; never been on medication. Start on labetalol 100mg bid at 13 weeks---> increase to 200 bid 12/16 on baby Aspirin (not taking consistently), check baseline preE labs normal and baseline urine p/c ratio 0.3  24 hr urine___    Growth 4 weeks in third tri ___, fetal surv at 32 weeks ___  Marijuana use: discussed cessation    IOB labs: O pos, normal, VZV NI--> PP vaccine, urine cx neg, GC/Chl neg  Genetic Screening: NIPT low risk MALE  Anatomy: normal MALE, posterior placenta  Flu:  TDAP: NEEDS  Third Tri Labs: Declined Glucose, Rx sent for Glucometer, strips   Blood sugar log:   GBS:    Pain mgmt. in labor:  Feeding:  Circ:  Social: . Pt is a  at "[employer] FOB Alejandra Trujillo - helps with moving             Chronic headaches ICD-10-CM: R51.9, G89.29  ICD-9-CM: 784.0  4/30/2012 - Present    Overview Signed 4/30/2012  6:50 PM by Yahaira Price     [facility]                 Attending Physician:  Willy Hinton MD    Delivery Type:  Vaginal Childbirth: What To Expect At Home    Your Recovery: Your body will slowly heal in the next few weeks. It is easy to get too tired and overwhelmed during the first weeks after your baby is born.  Changes in your hormones can shift your mood without warning. You may find it hard to meet the extra demands on your energy and time. Take it easy on yourself. Follow-up care is a key part of your treatment and safety. Be sure to make and go to all appointments, and call your doctor if you are having problems. It's also a good idea to know your test results and keep a list of the medicines you take. How can you care for yourself at home? Vaginal bleeding and cramps  · After delivery, you will have a bloody discharge from the vagina. This will turn pink within a week and then white or yellow after about 10 days. It may last for 2 to 4 weeks or longer, until the uterus has healed. Use pads instead of tampons until you stop bleeding. · Do not worry if you pass some blood clots, as long as they are smaller than a golf ball. If you have a tear or stitches in your vaginal area, change the pad at least every 4 hours to prevent soreness and infection. · You may have cramps for the first few days after childbirth. These are normal and occur as the uterus shrinks to normal size. Take an over-the-counter pain medicine, such as acetaminophen (Tylenol), ibuprofen (Advil, Motrin), or naproxen (Aleve), for cramps. Read and follow all instructions on the label. Do not take aspirin, because it can cause more bleeding. Do not take acetaminophen (Tylenol) and other acetaminophen containing medications (i.e. Percocet) at the same time. Breast fullness  · Your breasts may overfill (engorge) in the first few days after delivery. To help milk flow and to relieve pain, warm your breasts in the shower or by using warm, moist towels before nursing. · If you are not nursing, do not put warmth on your breasts or touch your breasts. Wear a tight bra or sports bra and use ice until the fullness goes away. This usually takes 2 to 3 days. · Put ice or a cold pack on your breast after nursing to reduce swelling and pain.  Put a thin cloth between the ice and your skin.    Activity  · Eat a balanced diet. Do not try to lose weight by cutting calories. Keep taking your prenatal vitamins, or take a multivitamin. · Get as much rest as you can. Try to take naps when your baby sleeps during the day. · Get some exercise every day. But do not do any heavy exercise until your doctor says it is okay. · Wait until you are healed (about 4 to 6 weeks) before you have sexual intercourse. Your doctor will tell you when it is okay to have sex. · Talk to your doctor about birth control. You can get pregnant even before your period returns. Also, you can get pregnant while you are breast-feeding. Mental Health  · Many women get the \"baby blues\" during the first few days after childbirth. You may lose sleep, feel irritable, and cry easily. You may feel happy one minute and sad the next. Hormone changes are one cause of these emotional changes. Also, the demands of a new baby, along with visits from relatives or other family needs, add to a mother's stress. The \"baby blues\" often peak around the fourth day. Then they ease up in less than 2 weeks. · If your moodiness or anxiety lasts for more than 2 weeks, or if you feel like life is not worth living, you may have postpartum depression. This is different for each mother. Some mothers with serious depression may worry intensely about their infant's well-being. Others may feel distant from their child. Some mothers might even feel that they might harm their baby. A mother may have signs of paranoia, wondering if someone is watching her. · With all the changes in your life, you may not know if you are depressed. Pregnancy sometimes causes changes in how you feel that are similar to the symptoms of depression. · Symptoms of depression include:  · Feeling sad or hopeless and losing interest in daily activities. These are the most common symptoms of depression. · Sleeping too much or not enough. · Feeling tired.  You may feel as if you have no energy. · Eating too much or too little. · [employer] ([employer]) offers a Warm line; Chat with the Expert phone sessions; Information and Articles about Pregnancy and Postpartum Mood Disorders; Comprehensive List of Free Support Groups; Knowledgeable local coordinators who will offer support, information, and resources; Guide to Resources on [employer]; Calendar of events in the  mood disorders community; Latest News and Research; and [facility] [address] for [employer]. Remember - You are not alone; You are not to blame; With help, you will be well. 6-115-480-PPD(7765). WWW. POSTPARTUM. NET   · Writing or talking about death, such as writing suicide notes or talking about guns, knives, or pills. Keep the numbers for these national suicide hotlines: 8-224-060-TALK (5-694.474.5144) and 4-353-YGGAIXV (6-226.474.6941). If you or someone you know talks about suicide or feeling hopeless, get help right away. Constipation and Hemorrhoids  · Drink plenty of fluids, enough so that your urine is light yellow or clear like water. If you have kidney, heart, or liver disease and have to limit fluids, talk with your doctor before you increase the amount of fluids you drink. · Eat plenty of fiber each day. Have a bran muffin or bran cereal for breakfast, and try eating a piece of fruit for a mid-afternoon snack. · For painful, itchy hemorrhoids, put ice or a cold pack on the area several times a day for 10 minutes at a time. Follow this by putting a warm compress on the area for another 10 to 20 minutes or by sitting in a shallow, warm bath. When should you call for help? Call 911 anytime you think you may need emergency care. For example, call if:  · You are thinking of hurting yourself, your baby, or anyone else. · You passed out (lost consciousness). · You have symptoms of a blood clot in your lung (called a pulmonary embolism).  These may include:    · Sudden chest pain.  · Trouble breathing. · Coughing up blood. Call your doctor now or seek immediate medical care if:  · You have severe vaginal bleeding. · You are soaking through a pad each hour for 2 or more hours. · Your vaginal bleeding seems to be getting heavier or is still bright red 4 days after delivery. · You are dizzy or lightheaded, or you feel like you may faint. · You are vomiting or cannot keep fluids down. · You have a fever. · You have new or more belly pain. · You pass tissue (not just blood). · Your vaginal discharge smells bad. · Your belly feels tender or full and hard. · Your breasts are continuously painful or red. · You feel sad, anxious, or hopeless for more than a few days. · You have sudden, severe pain in your belly. · You have symptoms of a blood clot in your leg (called a deep vein thrombosis),          such as:  · Pain in your calf, back of the knee, thigh, or groin. · Redness and swelling in your leg or groin. · You have symptoms of preeclampsia, such as:  · Sudden swelling of your face, hands, or feet. · New vision problems (such as dimness or blurring). · A severe headache. · Your blood pressure is higher than it should be or rises suddenly. · You have new nausea or vomiting. Watch closely for changes in your health, and be sure to contact your doctor if you have any problems. Additional Information:  Learning About Hypertensive Disorders After Childbirth    What is preeclampsia? A woman with preeclampsia has blood pressure that is higher than usual. She may also have other serious symptoms. Preeclampsia can be dangerous. When it is severe, it can cause seizures (eclampsia) or liver or kidney damage. When the liver is affected, some women get HELLP syndrome, a blood-clotting and bleeding problem. HELLP can come on quickly and can be deadly. This is why your doctor checks you and your baby often. Preeclampsia usually occurs after 20 weeks of pregnancy. In rare cases, it is first noted right after childbirth. Most often, it starts near the end of pregnancy and goes away after childbirth. What are the symptoms? Mild preeclampsia usually doesn't cause symptoms. But preeclampsia can cause rapid weight gain and sudden swelling of the hands and face. Severe preeclampsia does cause symptoms. It can cause a very bad headache and trouble seeing and breathing. It also can cause belly pain. You may also urinate less than usual.    If you have new preeclampsia symptoms after you go home from the hospital, call your doctor right away. What can you expect after you have had preeclampsia? In the hospital  After the baby and the placenta are delivered, preeclampsia usually starts to improve. Most women get better in the first few days after childbirth. After having preeclampsia, you still have a risk of seizures for a day or more after childbirth. (Very rarely, seizures happen later on.) So your doctor may have you take magnesium sulfate for a day or more to prevent seizures. You may also take medicine to lower your blood pressure. When you go home  Your blood pressure will most likely return to normal a few days after delivery. Your doctor will want to check your blood pressure sometime in the first week after you leave the hospital.    Some women still have high blood pressure 6 weeks after childbirth. But most return to normal levels over the long term. · Take and record your blood pressure at home if your doctor tells you to. · Learn the importance of the two measures of blood pressure (such as 120 over 80, or 120/80). The first number is the systolic pressure. This is the force of blood on the artery walls as the heart pumps. The second number is the diastolic pressure. This is the force of blood on the artery walls between heartbeats, when the heart is at rest. You have a choice of monitors to use. Manual monitor:  You pump up the cuff and use a stethoscope to listen for your  Pulse. · Electronic monitor: The cuff inflates, and a gauge shows your pulse rate. · To take your blood pressure:  · Ask your doctor to check your blood pressure monitor to be sure that it is accurate and that the cuff fits you. Also ask your doctor to watch you use it, to make sure that you are using it right. · You should not eat, use tobacco products, or use medicine known to raise blood pressure (such as some nasal decongestant sprays) before you take your blood pressure. · Avoid taking your blood pressure if you have just exercised or are nervous or upset. Rest at least 15 minutes before you take your blood pressure. · Be safe with medicines. If you take medicine, take it exactly as prescribed. Call your doctor if you think you are having a problem with your medicine. · Do not smoke. Quitting smoking will help lower your blood pressure and improve your baby's growth and health. If you need help quitting, talk to your doctor about stop-smoking programs and medicines. These can increase your chances of quitting for good. · Eat a balanced and healthy diet that has lots of fruits and vegetables. Long-term health   After you have had preeclampsia, you have a higher-than-average risk of heart disease, stroke, and kidney disease. This may be because the same things that cause preeclampsia also cause heart and kidney disease. To protect your health, work with your doctor on living a heart-healthy lifestyle and getting the checkups you need. Your doctor may also want you to check your blood pressure at home. Follow-up care is a key part of your treatment and safety. Be sure to make and go to all appointments, and call your doctor if you are having problems. It's also a good idea to know your test results and keep a list of the medicines you take.       These are general instructions for a healthy lifestyle:    No smoking/ No tobacco products/ Avoid exposure to second hand smoke    Surgeon General's Warning:  Quitting smoking now greatly reduces serious risk to your health. Obesity, smoking, and sedentary lifestyle greatly increases your risk for illness    A healthy diet, regular physical exercise & weight monitoring are important for maintaining a healthy lifestyle    Recognize signs and symptoms of STROKE:    F-face looks uneven    A-arms unable to move or move unevenly    S-speech slurred or non-existent    T-time-call 911 as soon as signs and symptoms begin - DO NOT go       back to bed or wait to see if you get better - TIME IS BRAIN. I have had the opportunity to make my options or choices for discharge. I have received and understand these instructions. Postpartum Support Groups  We know that all of us are dealing with a tremendous amount of uncertainty, confusion and disruption to our daily lives, which may result in increased anxiety, depression and fear. If you are feeling unsettled or worse, please know that we are here to help. During this time of increased caution and care for one another, [facility] [place] ([address] [place] [address]) is offering virtual support groups to ALL MOTHERS in [place] regardless of the age of your child/children as a way to help weather this emotional storm together. Social support is an important part of self-care during this time of physical distancing. Virtual postpartum support group meetings available at www. postpartumva.org  Warm Line: 390.103.9696    Breastfeeding Support Groups (virtual)  1st and 3rd Wednesday of each month  2nd and 4th Tuesday of each month    [place] at [email or web address] under the \"About Us\" and \"Classes and Events tabs\"

## 2022-02-23 NOTE — PROGRESS NOTES
Postpartum BP check    Gene Simmons is a 24 y.o.  presenting for postpartum BP check. She delivered on 22 by  and delivery was complicated by  delivery at 35w, IOL chronic hypertension with superimposed pre-eclampsia. Patient was frequently non-compliant with BP medication instructions during pregnancy, was discharged on labetalol 200mg BID. States today she has been taking this medication as directed. She denies a headache or RUQ pain. 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 Bleeding - light lochia  Perineal/Incisional pain - none  Mood - good  Feeding - transitioned to formula after recent illness  Contraception -         Past Medical History:   Diagnosis Date    Asthma     Depression     Eczema     Gestational hypertension     Hypertension     Second hand smoke exposure     Vision decreased     glasses used, c/o vision problems       No past surgical history on file.     Family History   Problem Relation Age of Onset    Hypertension Mother     Asthma Sister     Asthma Brother     Hypertension Maternal Uncle     Hypertension Maternal Grandmother     Asthma Maternal Grandmother     Cancer Other         breast cancer       Social History     Socioeconomic History    Marital status: SINGLE     Spouse name: Not on file    Number of children: Not on file    Years of education: Not on file    Highest education level: Not on file   Occupational History    Not on file   Tobacco Use    Smoking status: Never Smoker    Smokeless tobacco: Never Used   Substance and Sexual Activity    Alcohol use: No    Drug use: No    Sexual activity: Never   Other Topics Concern     Service Not Asked    Blood Transfusions Not Asked    Caffeine Concern Not Asked    Occupational Exposure Not Asked    Hobby Hazards Not Asked    Sleep Concern Not Asked    Stress Concern Not Asked    Weight Concern Not Asked    Special Diet Not Asked    Back Care Not Asked    Exercise Not Asked    Bike Helmet Not Asked    Seat Belt Not Asked    Self-Exams Not Asked   Social History Narrative    ** Merged History Encounter **          Social Determinants of Health     Financial Resource Strain:     Difficulty of Paying Living Expenses: Not on file   Food Insecurity:     Worried About Running Out of Food in the Last Year: Not on file    Handy of Food in the Last Year: Not on file   Transportation Needs:     Lack of Transportation (Medical): Not on file    Lack of Transportation (Non-Medical): Not on file   Physical Activity:     Days of Exercise per Week: Not on file    Minutes of Exercise per Session: Not on file   Stress:     Feeling of Stress : Not on file   Social Connections:     Frequency of Communication with Friends and Family: Not on file    Frequency of Social Gatherings with Friends and Family: Not on file    Attends Protestant Services: Not on file    Active Member of [address] [place] [address] or Organizations: Not on file    Attends Club or Organization Meetings: Not on file    Marital Status: Not on file   Intimate Partner Violence:     Fear of Current or Ex-Partner: Not on file    Emotionally Abused: Not on file    Physically Abused: Not on file    Sexually Abused: Not on file   Housing Stability:     Unable to Pay for Housing in the Last Year: Not on file    Number of [place] in the Last Year: Not on file    Unstable Housing in the Last Year: Not on file       Current Outpatient Medications   Medication Sig Dispense Refill    labetaloL (NORMODYNE) 300 mg tablet Take 1 Tablet by mouth every eight (8) hours. 90 Tablet 0    labetaloL (NORMODYNE) 200 mg tablet Take 1 Tablet by mouth two (2) times a day.  60 Tablet 1       No Known Allergies    Review of Systems - History obtained from the patient  Constitutional: negative for weight loss, fever, night sweats  HEENT: negative for hearing loss, earache, congestion, snoring, sorethroat  CV: negative for chest pain, palpitations, edema  Resp: negative for cough, shortness of breath, wheezing  GI: negative for change in bowel habits, abdominal pain, black or bloody stools  : negative for frequency, dysuria, hematuria, vaginal discharge  MSK: negative for back pain, joint pain, muscle pain  Breast: negative for breast lumps, nipple discharge, galactorrhea  Skin :negative for itching, rash, hives  Neuro: negative for dizziness, headache, confusion, weakness  Psych: negative for anxiety, depression, change in mood  Heme/lymph: negative for bleeding, bruising, pallor    Physical Exam    Visit Vitals  BP (!) 166/108 (BP 1 Location: Left arm, BP Patient Position: Sitting)   Ht 5' (1.524 m)   Wt 158 lb (71.7 kg)   BMI 30.86 kg/m²         OBGyn Exam      Constitutional  · Appearance: well-nourished, well developed, alert, in no acute distress    HENT  · Head and Face: appears normal    Neck  · Inspection/Palpation: normal appearance, no masses or tenderness  · Thyroid: gland size normal, nontender    Chest  · Respiratory Effort: non-labored breathing    Cardiovascular  · Extremities: no peripheral edema    Gastrointestinal  · Abdominal Examination: abdomen non-distended, non-tender to palpation, no masses present  · Liver and spleen: no hepatomegaly present, spleen not palpable  · Hernias: no hernias identified    Genitourinary  · External Genitalia: normal appearance for age, no discharge present, no tenderness present, no inflammatory lesions present, no masses present, no atrophy present  · Vagina: normal vaginal vault without central or paravaginal defects, no discharge present, no inflammatory lesions present, no masses present  · Bladder: non-tender to palpation  · Urethra: appears normal  · Cervix: normal   · Uterus: normal size, shape and consistency  · Adnexa: no adnexal tenderness present, no adnexal masses present  · Perineum: perineum within normal limits, no evidence of trauma, no rashes or skin lesions present. Perineal laceration well healed. Skin  · General Inspection: no rash, no lesions identified    Neurologic/Psychiatric  · Mental Status:  · Orientation: grossly oriented to person, place and time  · Mood and Affect: mood normal, affect appropriate      Assessment/Plan:    1. Postpartum hypertension    BP today elevated. She is asymptomatic. Recommended checking labs today but pt was unable to stay for it. Increase labetalol to 300mg tid (from 200mg PO bid) and return in 1 week for BP check.       Chioma Celeste MD

## 2022-02-24 ENCOUNTER — OFFICE VISIT (OUTPATIENT)
Dept: OBGYN CLINIC | Age: 22
End: 2022-02-24
Payer: MEDICAID

## 2022-02-24 VITALS
SYSTOLIC BLOOD PRESSURE: 166 MMHG | HEIGHT: 60 IN | DIASTOLIC BLOOD PRESSURE: 108 MMHG | WEIGHT: 158 LBS | BODY MASS INDEX: 31.02 KG/M2

## 2022-02-24 PROCEDURE — 99213 OFFICE O/P EST LOW 20 MIN: CPT | Performed by: OBSTETRICS & GYNECOLOGY

## 2022-02-24 RX ORDER — LABETALOL 300 MG/1
300 TABLET, FILM COATED ORAL EVERY 8 HOURS
Qty: 90 TABLET | Refills: 0 | Status: SHIPPED | OUTPATIENT
Start: 2022-02-24

## 2022-02-24 NOTE — PATIENT INSTRUCTIONS
Learning About High Blood Pressure  What is high blood pressure? Blood pressure is a measure of how hard the blood pushes against the walls of your arteries. It's normal for blood pressure to go up and down throughout the day. But if it stays up, you have high blood pressure. Another name for high blood pressure is hypertension. Two numbers tell you your blood pressure. The first number is the systolic pressure (top number). It shows how hard the blood pushes when your heart is pumping. The second number is the diastolic pressure (bottom number). It shows how hard the blood pushes between heartbeats, when your heart is relaxed and filling with blood. Your doctor will give you a goal for your blood pressure based on your health and your age. High blood pressure (hypertension) means that the top number stays high, or the bottom number stays high, or both. High blood pressure increases the risk of stroke, heart attack, and other problems. What happens when you have high blood pressure? · Blood flows through your arteries with too much force. Over time, this can damage the heart and the walls of your arteries. But you can't feel it. High blood pressure usually doesn't cause symptoms. · High blood pressure makes your heart work harder. And that can lead to heart failure, which means your heart doesn't pump as much blood as your body needs. · Fat and calcium start to build up in your arteries. This buildup is called hardening of the arteries. It can cause many problems including a heart attack and stroke. · Arteries also carry blood and oxygen to organs like your eyes, kidneys, and brain. If high blood pressure damages those arteries, it can lead to vision loss, kidney disease, stroke, and a higher risk of dementia. How can you prevent high blood pressure? · Stay at a healthy weight. · Try to limit how much sodium you eat to less than 2,300 milligrams (mg) a day.  If you limit your sodium to 1,500 mg a day, you can lower your blood pressure even more. ? Buy foods that are labeled \"unsalted,\" \"sodium-free,\" or \"low-sodium. \" Foods labeled \"reduced-sodium\" and \"light sodium\" may still have too much sodium. ? Flavor your food with garlic, lemon juice, onion, vinegar, herbs, and spices instead of salt. Do not use soy sauce, steak sauce, onion salt, garlic salt, mustard, or ketchup on your food. ? Use less salt (or none) when recipes call for it. You can often use half the salt a recipe calls for without losing flavor. · Be physically active. Get at least 30 minutes of exercise on most days of the week. Walking is a good choice. You also may want to do other activities, such as running, swimming, cycling, or playing tennis or team sports. · Limit alcohol to 2 drinks a day for men and 1 drink a day for women. · Eat plenty of fruits, vegetables, and low-fat dairy products. Eat less saturated and total fats. How is high blood pressure treated? · Your doctor will suggest making lifestyle changes to help your heart. For example, your doctor may ask you to eat healthy foods, quit smoking, lose extra weight, and be more active. · If lifestyle changes don't help enough, your doctor may recommend that you take medicine. · When blood pressure is very high, medicines are needed to lower it. Follow-up care is a key part of your treatment and safety. Be sure to make and go to all appointments, and call your doctor if you are having problems. It's also a good idea to know your test results and keep a list of the medicines you take. Where can you learn more? Go to http://www.[employer].com/  Enter P501 in the search box to learn more about \"Learning About High Blood Pressure. \"  Current as of: April 29, 2021               Content Version: 13.0  © 2050-9136 Healthwise, Incorporated.    Care instructions adapted under license by [employer] (which disclaims liability or warranty for this information). If you have questions about a medical condition or this instruction, always ask your healthcare professional. [place] [address] any warranty or liability for your use of this information.

## 2022-02-24 NOTE — LETTER
2/24/2022 3:36 PM    Ms. Delaney March  [id number] [id number]        To Whom It May Concern,    The above patient is under my care during her postpartum recovery from the birth of her child. She is cleared to return to work on light duty as of 3/1/22. Please contact my office with any questions or concerns.         Sincerely,        Miguel Ortiz MD

## 2022-03-19 PROBLEM — Z34.00 ENCOUNTER FOR SUPERVISION PREGNANCY IN PRIMIGRAVIDA, ANTEPARTUM: Status: ACTIVE | Noted: 2021-09-02

## 2022-03-19 PROBLEM — O11.9 CHRONIC HYPERTENSION WITH SUPERIMPOSED PRE-ECLAMPSIA: Status: ACTIVE | Noted: 2022-01-29

## 2022-06-11 ENCOUNTER — APPOINTMENT (OUTPATIENT)
Dept: GENERAL RADIOLOGY | Age: 22
End: 2022-06-11
Attending: PHYSICIAN ASSISTANT
Payer: MEDICAID

## 2022-06-11 ENCOUNTER — HOSPITAL ENCOUNTER (EMERGENCY)
Age: 22
Discharge: HOME | End: 2022-06-11
Attending: EMERGENCY MEDICINE
Payer: MEDICAID

## 2022-06-11 VITALS
HEART RATE: 97 BPM | DIASTOLIC BLOOD PRESSURE: 111 MMHG | SYSTOLIC BLOOD PRESSURE: 171 MMHG | RESPIRATION RATE: 18 BRPM | OXYGEN SATURATION: 96 % | TEMPERATURE: 97.4 F

## 2022-06-11 DIAGNOSIS — S90.111A CONTUSION OF RIGHT GREAT TOE WITHOUT DAMAGE TO NAIL, INITIAL ENCOUNTER: Primary | ICD-10-CM

## 2022-06-11 PROCEDURE — 73630 X-RAY EXAM OF FOOT: CPT

## 2022-06-11 PROCEDURE — 74011250637 HC RX REV CODE- 250/637: Performed by: PHYSICIAN ASSISTANT

## 2022-06-11 PROCEDURE — 99283 EMERGENCY DEPT VISIT LOW MDM: CPT

## 2022-06-11 RX ORDER — IBUPROFEN 600 MG/1
600 TABLET ORAL
Status: COMPLETED | OUTPATIENT
Start: 2022-06-11 | End: 2022-06-11

## 2022-06-11 RX ADMIN — IBUPROFEN 600 MG: 600 TABLET, FILM COATED ORAL at 11:30

## 2022-06-11 NOTE — ED PROVIDER NOTES
34-year-old female history of asthma, depression, gestational hypertension, hypertension presenting to the ED for right foot pain. 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 Patient notes moderately severe throbbing pain since then, worse with palpation and attempts at weightbearing, also notes decreased range of motion. Denies pain in the ankle or lower leg. No medications or treatment attempted prior to arrival.  No other concerns. Past medical history: As above  Past surgical history: Denies  Social history: Occasional marijuana. Works as a supervisor at [employer]. Past Medical History:   Diagnosis Date    Asthma     Depression     Eczema     Gestational hypertension     Hypertension     Second hand smoke exposure     Vision decreased     glasses used, c/o vision problems       History reviewed. No pertinent surgical history.       Family History:   Problem Relation Age of Onset    Hypertension Mother     Asthma Sister     Asthma Brother     Hypertension Maternal Uncle     Hypertension Maternal Grandmother     Asthma Maternal Grandmother     Cancer Other         breast cancer       Social History     Socioeconomic History    Marital status: SINGLE     Spouse name: Not on file    Number of children: Not on file    Years of education: Not on file    Highest education level: Not on file   Occupational History    Not on file   Tobacco Use    Smoking status: Never Smoker    Smokeless tobacco: Never Used   Substance and Sexual Activity    Alcohol use: No    Drug use: Yes     Types: Marijuana    Sexual activity: Never   Other Topics Concern     Service Not Asked    Blood Transfusions Not Asked    Caffeine Concern Not Asked    Occupational Exposure Not Asked    Hobby Hazards Not Asked    Sleep Concern Not Asked    Stress Concern Not Asked    Weight Concern Not Asked    Special Diet Not Asked    Back Care Not Asked    Exercise Not Asked    Bike Helmet Not Asked    Seat Belt Not Asked    Self-Exams Not Asked   Social History Narrative    ** Merged History Encounter **          Social Determinants of Health     Financial Resource Strain:     Difficulty of Paying Living Expenses: Not on file   Food Insecurity:     Worried About Running Out of Food in the Last Year: Not on file    Handy of Food in the Last Year: Not on file   Transportation Needs:     Lack of Transportation (Medical): Not on file    Lack of Transportation (Non-Medical): Not on file   Physical Activity:     Days of Exercise per Week: Not on file    Minutes of Exercise per Session: Not on file   Stress:     Feeling of Stress : Not on file   Social Connections:     Frequency of Communication with Friends and Family: Not on file    Frequency of Social Gatherings with Friends and Family: Not on file    Attends Mandaeism Services: Not on file    Active Member of [address] [place] [address] or Organizations: Not on file    Attends Club or Organization Meetings: Not on file    Marital Status: Not on file   Intimate Partner Violence:     Fear of Current or Ex-Partner: Not on file    Emotionally Abused: Not on file    Physically Abused: Not on file    Sexually Abused: Not on file   Housing Stability:     Unable to Pay for Housing in the Last Year: Not on file    Number of [place] in the Last Year: Not on file    Unstable Housing in the Last Year: Not on file         ALLERGIES: Patient has no known allergies. Review of Systems   Constitutional: Negative for fever. HENT: Negative for facial swelling. Respiratory: Negative for shortness of breath. Cardiovascular: Negative for chest pain. Gastrointestinal: Negative for vomiting. Musculoskeletal:        +toe pain   Skin: Negative for wound. Neurological: Negative for syncope. All other systems reviewed and are negative.       Vitals:    06/11/22 1100 BP: (!) 171/111   Pulse: 97   Resp: 18   Temp: 97.4 °F (36.3 °C)   SpO2: 96%            Physical Exam  Vitals and nursing note reviewed. Constitutional:       Appearance: She is well-developed. HENT:      Head: Normocephalic. Eyes:      Conjunctiva/sclera: Conjunctivae normal.   Cardiovascular:      Rate and Rhythm: Normal rate. Pulmonary:      Effort: Pulmonary effort is normal. No respiratory distress. Musculoskeletal:         General: Normal range of motion. Cervical back: Neck supple. Comments: Right great toe: Tenderness over the first MTP joint and also the toe itself. Patient can move it a little bit but full flexion/extension of the toe is limited due to pain. Normal cap refill and pedal pulses. Normal sensation. No tenderness in the remainder of the foot, ankle, lower leg   Skin:     General: Skin is warm and dry. Neurological:      Mental Status: She is alert and oriented to person, place, and time. MDM  Number of Diagnoses or Management Options  Diagnosis management comments: 58-year-old female presenting to the ED for toe pain after mechanical fall yesterday. No dislocation or fracture on x-ray. Discussed likely sprain/strain of the toe. Will recommend NSAID, ice, elevation, hugo tape, rest, Ortho follow-up if not improving.        Amount and/or Complexity of Data Reviewed  Tests in the radiology section of CPT®: reviewed and ordered  Discuss the patient with other providers: yes (Dr. Ayaan Spain, ED attending)           Procedures

## 2022-06-11 NOTE — ED TRIAGE NOTES
Pt ambulatory to ED for c/o right foot pain at base of big toe. Pt states she fell walking up steps and tripped.  Pain with movement

## 2022-06-11 NOTE — DISCHARGE INSTRUCTIONS
Return for new or worsening symptoms. Your x-ray did not show any broken bones or dislocations. It is likely that you have bruised the toe, and you may have sprained or strained it. Apply ice 20 minutes at a time. Take ibuprofen, 2 to 3 tablets every 6-8 hours, as needed for pain. Tape the toes together to provide support. Wear supportive tennis shoes while it is healing. If not feeling better after 7 to 10 days, make a follow-up appointment with Ortho.

## 2023-12-04 ENCOUNTER — NURSE TRIAGE (OUTPATIENT)
Dept: OTHER | Facility: CLINIC | Age: 23
End: 2023-12-04

## 2023-12-04 NOTE — TELEPHONE ENCOUNTER
[facility] [place] [facility]    Received call from [facility] with [facility]. Subjective: Caller states \"I get migraines and high blood pressure. I am really sick right now. I need a PCP. I know I caught a cold from my son. I have a runny nose and sore throat. I have green yellow mucus. This is the 3rd time I have had this. \"     Current Symptoms:  sore throat. No swollen lymph nodes. Unable to see if white patches in throat. Onset: 2 days ago; sudden    Associated Symptoms:cough with green mucus. SOB with exertion, pt speaking in complete sentences. Sinus pressure, green drainage. Pain Severity: 4/10; throat; constant    Temperature: denies     What has been tried: PCN left over from when had pharyngitis she took. No improvement. LMP:  \"last week of November around Thanksgiving\"  Pregnant: No    Recommended disposition: See in Office Today If unable to get in with a new provider go to urgent care. Care advice provided, patient verbalizes understanding; denies any other questions or concerns; instructed to call back for any new or worsening symptoms. Patient/Caller agrees with recommended disposition; writer provided warm transfer to [place] at [facility] for appointment scheduling    Attention Provider: Thank you for allowing me to participate in the care of your patient. The patient was connected to triage in response to information provided to the ECC/PSC. Please do not respond through this encounter as the response is not directed to a shared pool.         Reason for Disposition   Earache also present    Protocols used: Sore Throat-ADULT-[place]

## 2024-05-10 ENCOUNTER — ROUTINE PRENATAL (OUTPATIENT)
Age: 24
End: 2024-05-10

## 2024-05-10 VITALS — SYSTOLIC BLOOD PRESSURE: 150 MMHG | WEIGHT: 153 LBS | DIASTOLIC BLOOD PRESSURE: 100 MMHG | BODY MASS INDEX: 29.88 KG/M2

## 2024-05-10 DIAGNOSIS — R03.0 ELEVATED BLOOD PRESSURE READING: ICD-10-CM

## 2024-05-10 DIAGNOSIS — Z34.90 PREGNANCY, UNSPECIFIED GESTATIONAL AGE: Primary | ICD-10-CM

## 2024-05-10 DIAGNOSIS — O10.919 CHRONIC HYPERTENSION AFFECTING PREGNANCY: ICD-10-CM

## 2024-05-10 PROBLEM — Z34.00 ENCOUNTER FOR SUPERVISION PREGNANCY IN PRIMIGRAVIDA, ANTEPARTUM: Status: RESOLVED | Noted: 2021-09-02 | Resolved: 2024-05-10

## 2024-05-10 PROBLEM — O11.9 CHRONIC HYPERTENSION WITH SUPERIMPOSED PRE-ECLAMPSIA: Status: RESOLVED | Noted: 2022-01-29 | Resolved: 2024-05-10

## 2024-05-10 LAB
HEP B, EXTERNAL RESULT: NEGATIVE
HEPATITIS C ANTIBODY, EXTERNAL RESULT: NORMAL
HIV, EXTERNAL RESULT: NORMAL
RUBELLA TITER, EXTERNAL RESULT: REACTIVE

## 2024-05-10 RX ORDER — ASPIRIN 81 MG/1
81 TABLET ORAL DAILY
Qty: 90 TABLET | Refills: 2 | Status: SHIPPED | OUTPATIENT
Start: 2024-05-10

## 2024-05-10 RX ORDER — NIFEDIPINE 30 MG
30 TABLET, EXTENDED RELEASE ORAL DAILY
Qty: 30 TABLET | Refills: 3 | Status: SHIPPED | OUTPATIENT
Start: 2024-05-10

## 2024-05-10 NOTE — PROGRESS NOTES
Initial Prenatal Note    US today shows the following:  TA ULTRASOUND PERFORMED A SINGLE VIABLE 15W1D WITH KAYLEIGH OF 10/31/2024 IUP IS SEEN WITH NORMAL CARDIAC RHYTHM. GESTATIONAL AGE BASED ON TODAY'S ULTRASOUND. THERE APPEARS TO BE A POSTERIOR PLACENTA. RIGHT OVARY APPEARS WNL. LEFT OVARY APPEARS TO HAVE A SIMPLE CYST MEASURING 47 X 38 X 39 MM. NO FREE FLUID IS SEEN IN THE CDS    CC: Amenorrhea, positive pregnancy test    HPI:  Cole Naylor is a 23 y.o. No obstetric history on file. who presents for initial prenatal appointment. Since her LMP she has experienced nausea and vomiting. She denies dysuria, discharge, vaginal bleeding.    LMP history:  The date of her LMP is  certain.  Her last menstrual period was normal.    Based on her LMP, her EDC is 10/29/2024 and her EGA is 15 weeks,3 days.     Ultrasound data:  She had an  ultrasound done by the ultrasound tech today which revealed a viable mesa pregnancy with a gestational age of 15 weeks and 1 days giving an EDC of 10/31/2024.    She is dated by LMP.    Relevant past pregnancy history:  She has the following pregnancy history:  Vaginal delivery .  Patient states having hypertension requiring labetalol use  She does history of  delivery.    Relevant past medical history:   Noncontributory    Last Pap:  WNL per patient    Relevant social history:  Her occupation is: unemployed currently seeking a new job.   Her partner's name is Miguel.    1. Have you been to the ER, urgent care clinic, or hospitalized since your last visit?No    2. Have you seen or consulted any other health care providers outside of the [facility] since your last visit? No    She declines  a chaperone during the gynecologic exam today.      Orly Merlos RN

## 2024-05-10 NOTE — PROGRESS NOTES
Initial Obstetric Visit    Current pregnancy history:    Cole Naylor is a  23 y.o. female Black /  Patient's last menstrual period was 2024 (exact date)..    She presents for the evaluation of amenorrhea and a positive pregnancy test.    LMP history:    Her last menstrual period was normal and her LMP is certain.         Ultrasound data:  She had an ultrasound performed today in the office which revealed a viable mesa pregnancy.  See ultrasound report for details.     Her final estimated due date is 10/29/2024, 15w3d today.  KAYLEIGH is derived from her LMP, which is concordant with ultrasound dating.      Pregnancy symptoms:  Since her LMP she has experienced nausea and occasional emesis.  Occasional dizzy spells.   She denies dysuria, discharge, vaginal bleeding.    Desires NIPT with gender.    Past pregnancy history:   -  x1 at 35w2d boy - \"[place]\"  Last baby delivered by [place], 35 weeks.  Was on labetalol last pregnancy    Relevant medical problems:  CHTN - does not have a PCP, not currently on any antihypertensive medication  CARP on her face - on minocycline in the past. Aware she cannot be on that medication in pregnancy    Social History:  Pt not currently working  [employer] - works in anival, electician, [employer],      _ _ _ _ _ _ _ _ _ _ _ _ _ _ _ _ _ _ _ _ _ _ _ _ _ _ _ _ _ _ _ _     Substance history: Since first positive pregnancy test; negative for alcohol, tobacco and street drugs.             Exposure history:   There is/are no outdoor cat/s in the home. If yes, the patient was instructed to not change the cat litter.   She admits close contact with children on a regular basis.   She has had chicken pox or the vaccine in the past.   Patient denies issues with domestic violence.     Genetic Screening/Teratology Counseling: (Includes patient, baby's father, or anyone in either family with:)  1.  Patient's age >/= 35 at EDC?-- no  2.  Thalassemia (Italian,

## 2024-05-11 LAB
ABO + RH BLD: NORMAL
ALBUMIN SERPL-MCNC: 3.5 G/DL (ref 3.5–5)
ALBUMIN/GLOB SERPL: 1 (ref 1.1–2.2)
ALP SERPL-CCNC: 72 U/L (ref 45–117)
ALT SERPL-CCNC: 13 U/L (ref 12–78)
ANION GAP SERPL CALC-SCNC: 4 MMOL/L (ref 5–15)
AST SERPL-CCNC: 9 U/L (ref 15–37)
BACTERIA SPEC CULT: NORMAL
BILIRUB SERPL-MCNC: 0.3 MG/DL (ref 0.2–1)
BLOOD BANK CMNT PATIENT-IMP: NORMAL
BLOOD GROUP ANTIBODIES SERPL: NORMAL
BUN SERPL-MCNC: 7 MG/DL (ref 6–20)
BUN/CREAT SERPL: 14 (ref 12–20)
CALCIUM SERPL-MCNC: 9.5 MG/DL (ref 8.5–10.1)
CC UR VC: NORMAL
CHLORIDE SERPL-SCNC: 107 MMOL/L (ref 97–108)
CO2 SERPL-SCNC: 25 MMOL/L (ref 21–32)
CREAT SERPL-MCNC: 0.49 MG/DL (ref 0.55–1.02)
CREAT UR-MCNC: 103 MG/DL
ERYTHROCYTE [DISTWIDTH] IN BLOOD BY AUTOMATED COUNT: 14.7 % (ref 11.5–14.5)
GLOBULIN SER CALC-MCNC: 3.5 G/DL (ref 2–4)
GLUCOSE SERPL-MCNC: 74 MG/DL (ref 65–100)
HBV SURFACE AG SER QL: 0.66 INDEX
HBV SURFACE AG SER QL: NEGATIVE
HCT VFR BLD AUTO: 37.9 % (ref 35–47)
HCV AB SER IA-ACNC: 0.04 INDEX
HCV AB SERPL QL IA: NONREACTIVE
HGB BLD-MCNC: 13.1 G/DL (ref 11.5–16)
HIV 1+2 AB+HIV1 P24 AG SERPL QL IA: NONREACTIVE
HIV 1/2 RESULT COMMENT: NORMAL
LDH SERPL L TO P-CCNC: 160 U/L (ref 81–246)
MCH RBC QN AUTO: 31 PG (ref 26–34)
MCHC RBC AUTO-ENTMCNC: 34.6 G/DL (ref 30–36.5)
MCV RBC AUTO: 89.6 FL (ref 80–99)
NRBC # BLD: 0 K/UL (ref 0–0.01)
NRBC BLD-RTO: 0 PER 100 WBC
PLATELET # BLD AUTO: 300 K/UL (ref 150–400)
PMV BLD AUTO: 9.6 FL (ref 8.9–12.9)
POTASSIUM SERPL-SCNC: 3.8 MMOL/L (ref 3.5–5.1)
PROT SERPL-MCNC: 7 G/DL (ref 6.4–8.2)
PROT UR-MCNC: 15 MG/DL (ref 0–11.9)
PROT/CREAT UR-RTO: 0.1
RBC # BLD AUTO: 4.23 M/UL (ref 3.8–5.2)
RUBV IGG SERPL IA-ACNC: NORMAL IU/ML
SERVICE CMNT-IMP: NORMAL
SODIUM SERPL-SCNC: 136 MMOL/L (ref 136–145)
SPECIMEN EXP DATE BLD: NORMAL
URATE SERPL-MCNC: 2.7 MG/DL (ref 2.6–6)
WBC # BLD AUTO: 14.3 K/UL (ref 3.6–11)

## 2024-05-12 LAB — VZV IGG SER IA-ACNC: <135 INDEX

## 2024-05-13 LAB
C TRACH RRNA SPEC QL NAA+PROBE: NEGATIVE
N GONORRHOEA RRNA SPEC QL NAA+PROBE: NEGATIVE
SPECIMEN SOURCE: ABNORMAL
T PALLIDUM AB SER QL IA: NON REACTIVE
T VAGINALIS RRNA SPEC QL NAA+PROBE: POSITIVE

## 2024-05-14 RX ORDER — METRONIDAZOLE 500 MG/1
500 TABLET ORAL 2 TIMES DAILY
Qty: 14 TABLET | Refills: 1 | Status: SHIPPED | OUTPATIENT
Start: 2024-05-14 | End: 2024-05-21

## 2024-05-15 LAB
HGB A MFR BLD: 97.7 % (ref 96.4–98.8)
HGB A2 MFR BLD COLUMN CHROM: 2.3 % (ref 1.8–3.2)
HGB F MFR BLD: 0 % (ref 0–2)
HGB FRACT BLD-IMP: NORMAL
HGB S MFR BLD: 0 %

## 2024-06-05 ENCOUNTER — TELEPHONE (OUTPATIENT)
Age: 24
End: 2024-06-05

## 2024-06-05 NOTE — TELEPHONE ENCOUNTER
Attempted to call pt in re to her missing her appt. No answer not able to leave vm on either number as one is not in service and the other is not accepting calls.

## 2024-06-07 ENCOUNTER — TELEPHONE (OUTPATIENT)
Age: 24
End: 2024-06-07

## 2024-06-07 NOTE — TELEPHONE ENCOUNTER
Attempted to call pt, no answer left vm to call office back. Pt needs to set up her prenatal appt's.

## 2024-06-11 ENCOUNTER — ROUTINE PRENATAL (OUTPATIENT)
Age: 24
End: 2024-06-11

## 2024-06-11 VITALS — WEIGHT: 160 LBS | BODY MASS INDEX: 31.25 KG/M2 | SYSTOLIC BLOOD PRESSURE: 146 MMHG | DIASTOLIC BLOOD PRESSURE: 100 MMHG

## 2024-06-11 DIAGNOSIS — Z34.90 PREGNANCY, UNSPECIFIED GESTATIONAL AGE: Primary | ICD-10-CM

## 2024-06-11 PROCEDURE — 0502F SUBSEQUENT PRENATAL CARE: CPT | Performed by: OBSTETRICS & GYNECOLOGY

## 2024-06-11 RX ORDER — METRONIDAZOLE 500 MG/1
500 TABLET ORAL 2 TIMES DAILY
Qty: 14 TABLET | Refills: 0 | Status: SHIPPED | OUTPATIENT
Start: 2024-06-11 | End: 2024-06-18

## 2024-06-11 ASSESSMENT — PATIENT HEALTH QUESTIONNAIRE - PHQ9
SUM OF ALL RESPONSES TO PHQ QUESTIONS 1-9: 0
1. LITTLE INTEREST OR PLEASURE IN DOING THINGS: NOT AT ALL
SUM OF ALL RESPONSES TO PHQ QUESTIONS 1-9: 0
SUM OF ALL RESPONSES TO PHQ QUESTIONS 1-9: 0
SUM OF ALL RESPONSES TO PHQ9 QUESTIONS 1 & 2: 0
SUM OF ALL RESPONSES TO PHQ QUESTIONS 1-9: 0
2. FEELING DOWN, DEPRESSED OR HOPELESS: NOT AT ALL

## 2024-06-11 NOTE — PROGRESS NOTES
Routine prenatal visit today.  Feeling light fetal movements now.  Had not taken her nifedipine yet today, states she usually takes it around 11am after she wakes up and eats.    Also taking ld aspirin.   She is still taking the flagyl, had not been able to take it consistently.  Her partner has not yet been treated.  Sent another rx today and stressed the importance of both parties properly treated.    Declines MSAFP today.    Fetal scan with M this afternoon.

## 2024-07-17 ENCOUNTER — ROUTINE PRENATAL (OUTPATIENT)
Age: 24
End: 2024-07-17

## 2024-07-17 VITALS — SYSTOLIC BLOOD PRESSURE: 148 MMHG | BODY MASS INDEX: 32.22 KG/M2 | DIASTOLIC BLOOD PRESSURE: 98 MMHG | WEIGHT: 165 LBS

## 2024-07-17 DIAGNOSIS — Z34.90 PREGNANCY, UNSPECIFIED GESTATIONAL AGE: Primary | ICD-10-CM

## 2024-07-17 DIAGNOSIS — A59.9 TRICHOMONAS INFECTION: ICD-10-CM

## 2024-07-17 LAB
C. TRACHOMATIS, EXTERNAL RESULT: NEGATIVE
N. GONORRHOEAE, EXTERNAL RESULT: NEGATIVE

## 2024-07-17 PROCEDURE — 0502F SUBSEQUENT PRENATAL CARE: CPT | Performed by: OBSTETRICS & GYNECOLOGY

## 2024-07-17 NOTE — PROGRESS NOTES
Routine prenatal visit today.  Normal fetal movements.  Missed her MFM fetal scan, advised pt to r/s and number provided.   Trich ROBERT today.  Reports she competed the flagyl.  Did not take her nifedipine this morning. Reports her last BP at home was 126/76 after taking the nifedipine.  Has trouble taking it consistently. Occasional nausea and lightheadedness with the heat - prec discussed.

## 2024-07-20 LAB
C TRACH RRNA SPEC QL NAA+PROBE: NEGATIVE
N GONORRHOEA RRNA SPEC QL NAA+PROBE: NEGATIVE
SPECIMEN SOURCE: NORMAL
T VAGINALIS RRNA SPEC QL NAA+PROBE: NEGATIVE

## 2024-07-23 ENCOUNTER — ROUTINE PRENATAL (OUTPATIENT)
Age: 24
End: 2024-07-23
Payer: MEDICAID

## 2024-07-23 VITALS — DIASTOLIC BLOOD PRESSURE: 85 MMHG | SYSTOLIC BLOOD PRESSURE: 142 MMHG | HEART RATE: 103 BPM

## 2024-07-23 DIAGNOSIS — O10.913 CHRONIC HYPERTENSION WITH EXACERBATION DURING PREGNANCY IN THIRD TRIMESTER: Primary | ICD-10-CM

## 2024-07-23 PROCEDURE — 76811 OB US DETAILED SNGL FETUS: CPT | Performed by: OBSTETRICS & GYNECOLOGY

## 2024-07-23 PROCEDURE — 99204 OFFICE O/P NEW MOD 45 MIN: CPT | Performed by: OBSTETRICS & GYNECOLOGY

## 2024-07-23 NOTE — PROCEDURES
PATIENT: ERNIE BURNS   -  : 2000   -  DOS:2024   -  INTERPRETING PROVIDER:Tigist Cifuentes,   Indication  ========    Hypertension    Method  ======    Transabdominal ultrasound examination. View: Suboptimal view: limited by late gestational age and fetal position    Dating  ======    LMP on: 2024  GA by LMP 26 w + 0 d  KAYLEIGH by LMP: 10/29/2024  Previous Ultrasound on: 5/10/2024  Type of prior assessment: GA  GA at prior assessment date 15 w + 0 d  GA by previous U/S 25 w + 4 d  KAYLEIGH by previous Ultrasound: 2024  Ultrasound examination on: 2024  GA by U/S based upon: AC, BPD, Femur, HC  GA by U/S 25 w + 2 d  KAYLEIGH by U/S: 11/3/2024  Assigned: based on the LMP, selected on 2024  Assigned GA 26 w + 0 d  Assigned KAYLEIGH: 10/29/2024    Fetal Growth Overview  =================    Exam date        GA              BPD (mm)         HC (mm)            AC (mm)              FL (mm)             HL (mm)             EFW (g)  2024        26w 0d        59.6     3%        230.4    6%        219.2     53%        46.6    22%        44.3     55%        856    32%    Fetal Biometry  ============    Standard  BPD 59.6 mm 24w 2d 3% Hadlock  OFD 83.9 mm 27w 2d 82% Brenda  .4 mm 25w 0d 6% Hadlock  Cerebellum tr 29.2 mm 25w 4d 34% Hill  .2 mm 26w 3d 53% Hadlock  Femur 46.6 mm 25w 4d 22% Hadlock  Humerus 44.3 mm 26w 2d 55% Brenda   g 25w 4d 32% Hadlock  EFW (lb) 1 lb  EFW (oz) 14 oz  EFW by: Hadlock (BPD-HC-AC-FL)  Extended   4.8 mm  CM 3.5 mm  <1% Nicolaides  Nasal bone 9.2 mm  Head / Face / Neck  Nasal bone: present  Other Structures   bpm    General Evaluation  ==============    Cardiac activity present.  bpm. Fetal movements: visualized. Presentation: BREECH  Placenta: Placental site: posterior, appropriate distance from the internal os. Placental edge-to-cervical os distance 4.8 cm  Umbilical cord: Cord vessels: 3 vessel cord. Insertion site: central  Amniotic

## 2024-08-09 PROBLEM — I10 HTN (HYPERTENSION), BENIGN: Status: ACTIVE | Noted: 2024-08-09

## 2024-08-20 ENCOUNTER — ROUTINE PRENATAL (OUTPATIENT)
Age: 24
End: 2024-08-20

## 2024-08-20 VITALS — SYSTOLIC BLOOD PRESSURE: 146 MMHG | WEIGHT: 167 LBS | DIASTOLIC BLOOD PRESSURE: 92 MMHG | BODY MASS INDEX: 32.61 KG/M2

## 2024-08-20 DIAGNOSIS — O10.919 CHRONIC HYPERTENSION AFFECTING PREGNANCY: Primary | ICD-10-CM

## 2024-08-20 DIAGNOSIS — Z3A.30 30 WEEKS GESTATION OF PREGNANCY: ICD-10-CM

## 2024-08-20 PROCEDURE — 0502F SUBSEQUENT PRENATAL CARE: CPT

## 2024-08-20 RX ORDER — ADHESIVE BANDAGE 3/4"
1 BANDAGE TOPICAL DAILY
Qty: 1 EACH | Refills: 0 | Status: SHIPPED | OUTPATIENT
Start: 2024-08-20

## 2024-08-20 NOTE — PROGRESS NOTES
ONEIL at 30wk.  Doing okay. FM+, denies LOF/VB/cxns.   Pt reports she has not been taking blood pressure medication. Denies s/s of preeclampsia.  140/90s in office.   [facility] offered pt admission for bp management. She does not like how nifedipine \"makes her feel\", declines labetalol. I offered switching meds or admission today due to concern for PTB and maternal/fetal well being. Pt declines.   Per [facility], pt can see cardiology for possible medication change. Pt never made appointment, lost paperwork. Referral placed again and paperwork given-instructed to make berto asap. Pt agrees to take nifedipine until then.   BP cuff ordered. Pt to take daily and report values.  Declines gtt. Supplies for blood sugar sent, reviewed to taking fastings and 1 hour post prandials daily for one week and bring in results to review. Needs third tri labs. Agrees to come in one week to review blood pressure, values, and do third tri labs.  RTO 1 wk.  Ilia San, NENITA - CELINA

## 2024-09-03 ENCOUNTER — ROUTINE PRENATAL (OUTPATIENT)
Age: 24
End: 2024-09-03

## 2024-09-03 VITALS
OXYGEN SATURATION: 99 % | SYSTOLIC BLOOD PRESSURE: 123 MMHG | WEIGHT: 170 LBS | HEIGHT: 60 IN | TEMPERATURE: 97.9 F | DIASTOLIC BLOOD PRESSURE: 74 MMHG | BODY MASS INDEX: 33.38 KG/M2 | RESPIRATION RATE: 18 BRPM | HEART RATE: 100 BPM

## 2024-09-03 DIAGNOSIS — Z34.90 PREGNANCY, UNSPECIFIED GESTATIONAL AGE: Primary | ICD-10-CM

## 2024-09-03 LAB
BLOOD BANK CMNT PATIENT-IMP: NORMAL
BLOOD GROUP ANTIBODIES SERPL: NORMAL
ERYTHROCYTE [DISTWIDTH] IN BLOOD BY AUTOMATED COUNT: 14.9 % (ref 11.5–14.5)
EST. AVERAGE GLUCOSE BLD GHB EST-MCNC: 103 MG/DL
FERRITIN SERPL-MCNC: 9 NG/ML (ref 8–252)
GLUCOSE P FAST SERPL-MCNC: 83 MG/DL (ref 65–100)
HBA1C MFR BLD: 5.2 % (ref 4–5.6)
HCT VFR BLD AUTO: 36 % (ref 35–47)
HGB BLD-MCNC: 11.1 G/DL (ref 11.5–16)
MCH RBC QN AUTO: 26.6 PG (ref 26–34)
MCHC RBC AUTO-ENTMCNC: 30.8 G/DL (ref 30–36.5)
MCV RBC AUTO: 86.1 FL (ref 80–99)
NRBC # BLD: 0 K/UL (ref 0–0.01)
NRBC BLD-RTO: 0 PER 100 WBC
PLATELET # BLD AUTO: 296 K/UL (ref 150–400)
PMV BLD AUTO: 10 FL (ref 8.9–12.9)
RBC # BLD AUTO: 4.18 M/UL (ref 3.8–5.2)
T. PALLIDUM (SYPHILIS) ANTIBODY, EXTERNAL RESULT: NORMAL
WBC # BLD AUTO: 15.6 K/UL (ref 3.6–11)

## 2024-09-03 PROCEDURE — 0502F SUBSEQUENT PRENATAL CARE: CPT | Performed by: OBSTETRICS & GYNECOLOGY

## 2024-09-03 RX ORDER — NIFEDIPINE 30 MG
30 TABLET, EXTENDED RELEASE ORAL 2 TIMES DAILY
Qty: 60 TABLET | Refills: 3 | Status: SHIPPED | OUTPATIENT
Start: 2024-09-03

## 2024-09-03 RX ORDER — GLUCOSAMINE HCL/CHONDROITIN SU 500-400 MG
CAPSULE ORAL
Qty: 100 STRIP | Refills: 2 | Status: SHIPPED | OUTPATIENT
Start: 2024-09-03

## 2024-09-03 RX ORDER — LANCETS 30 GAUGE
1 EACH MISCELLANEOUS 4 TIMES DAILY
Qty: 100 EACH | Refills: 2 | Status: SHIPPED | OUTPATIENT
Start: 2024-09-03

## 2024-09-03 NOTE — PROGRESS NOTES
Routine prenatal visit today.  Normal fetal movements.  Reports walking is uncomfortable. Present for 3 weeks duration.  Felt a sharp left pelvic pain which radiated down her left leg after pushing a cart at work.  Cervix checked today and ext os 1cm, internal os closed, long, and -4 station, reassured this is not a laboring cervix.    At rest, she feels better.    Referral for PT sent for sciatica.   Reports she has not yet eaten this morning. Declines glucola, has not started accuchecks at home yet as she states her insurance did not cover the supplies. Supplies resent, advised can also buy OTC. Check Hgb A1c and fasting glucose today with third tri labs.   She has been taking her nifedipine 30 XR and tolerating it well now.  Increased to bid as she relays her BP is creeping up to mild range later in the day. New rx for bid resent.   MFM referral resent again, has no upcoming appts.   Declines tdap.

## 2024-09-03 NOTE — PROGRESS NOTES
C/o pelvic pain today 6/10  Denies vb. Lof and contractions  Concerns with not being able to walk for more then 10 mins with out feeling like she is going to give birth  Active FM  Declines t-dap and glucola today   Statement Selected

## 2024-09-04 DIAGNOSIS — Z34.90 PREGNANCY, UNSPECIFIED GESTATIONAL AGE: Primary | ICD-10-CM

## 2024-09-04 LAB — T PALLIDUM AB SER QL IA: NON REACTIVE

## 2024-09-04 RX ORDER — FERROUS SULFATE 325(65) MG
325 TABLET ORAL
Qty: 90 TABLET | Refills: 1 | Status: SHIPPED | OUTPATIENT
Start: 2024-09-04

## 2024-09-13 ENCOUNTER — ROUTINE PRENATAL (OUTPATIENT)
Age: 24
End: 2024-09-13
Payer: MEDICAID

## 2024-09-13 ENCOUNTER — HOSPITAL ENCOUNTER (OUTPATIENT)
Facility: HOSPITAL | Age: 24
Setting detail: OBSERVATION
LOS: 1 days | Discharge: HOME | End: 2024-09-14
Attending: OBSTETRICS & GYNECOLOGY | Admitting: OBSTETRICS & GYNECOLOGY
Payer: MEDICAID

## 2024-09-13 VITALS — DIASTOLIC BLOOD PRESSURE: 108 MMHG | SYSTOLIC BLOOD PRESSURE: 152 MMHG | HEART RATE: 125 BPM

## 2024-09-13 DIAGNOSIS — O10.919 CHRONIC HYPERTENSION AFFECTING PREGNANCY: Primary | ICD-10-CM

## 2024-09-13 DIAGNOSIS — I10 HTN (HYPERTENSION), BENIGN: ICD-10-CM

## 2024-09-13 DIAGNOSIS — Z3A.33 33 WEEKS GESTATION OF PREGNANCY: ICD-10-CM

## 2024-09-13 PROBLEM — O16.3 ELEVATED BLOOD PRESSURE AFFECTING PREGNANCY IN THIRD TRIMESTER, ANTEPARTUM: Status: ACTIVE | Noted: 2024-09-13

## 2024-09-13 LAB
ALBUMIN SERPL-MCNC: 3 G/DL (ref 3.5–5)
ALBUMIN/GLOB SERPL: 0.9 (ref 1.1–2.2)
ALP SERPL-CCNC: 137 U/L (ref 45–117)
ALT SERPL-CCNC: 25 U/L (ref 12–78)
AST SERPL-CCNC: 18 U/L (ref 15–37)
BILIRUB DIRECT SERPL-MCNC: <0.1 MG/DL (ref 0–0.2)
BILIRUB SERPL-MCNC: 0.2 MG/DL (ref 0.2–1)
BUN SERPL-MCNC: 4 MG/DL (ref 6–20)
CREAT SERPL-MCNC: 0.61 MG/DL (ref 0.55–1.02)
CREAT UR-MCNC: 63.2 MG/DL
ERYTHROCYTE [DISTWIDTH] IN BLOOD BY AUTOMATED COUNT: 14.9 % (ref 11.5–14.5)
GLOBULIN SER CALC-MCNC: 3.5 G/DL (ref 2–4)
GLUCOSE SERPL-MCNC: 92 MG/DL (ref 65–100)
HCT VFR BLD AUTO: 33.7 % (ref 35–47)
HGB BLD-MCNC: 11 G/DL (ref 11.5–16)
LDH SERPL L TO P-CCNC: 194 U/L (ref 81–246)
MCH RBC QN AUTO: 27.1 PG (ref 26–34)
MCHC RBC AUTO-ENTMCNC: 32.6 G/DL (ref 30–36.5)
MCV RBC AUTO: 83 FL (ref 80–99)
NRBC # BLD: 0 K/UL (ref 0–0.01)
NRBC BLD-RTO: 0 PER 100 WBC
PLATELET # BLD AUTO: 298 K/UL (ref 150–400)
PMV BLD AUTO: 9.8 FL (ref 8.9–12.9)
PROT SERPL-MCNC: 6.5 G/DL (ref 6.4–8.2)
PROT UR-MCNC: 17 MG/DL (ref 0–11.9)
PROT/CREAT UR-RTO: 0.3
RBC # BLD AUTO: 4.06 M/UL (ref 3.8–5.2)
WBC # BLD AUTO: 11.9 K/UL (ref 3.6–11)

## 2024-09-13 PROCEDURE — 82947 ASSAY GLUCOSE BLOOD QUANT: CPT

## 2024-09-13 PROCEDURE — 84520 ASSAY OF UREA NITROGEN: CPT

## 2024-09-13 PROCEDURE — 82570 ASSAY OF URINE CREATININE: CPT

## 2024-09-13 PROCEDURE — 36415 COLL VENOUS BLD VENIPUNCTURE: CPT

## 2024-09-13 PROCEDURE — 99024 POSTOP FOLLOW-UP VISIT: CPT | Performed by: OBSTETRICS & GYNECOLOGY

## 2024-09-13 PROCEDURE — 85027 COMPLETE CBC AUTOMATED: CPT

## 2024-09-13 PROCEDURE — G0378 HOSPITAL OBSERVATION PER HR: HCPCS

## 2024-09-13 PROCEDURE — 6370000000 HC RX 637 (ALT 250 FOR IP)

## 2024-09-13 PROCEDURE — 6370000000 HC RX 637 (ALT 250 FOR IP): Performed by: OBSTETRICS & GYNECOLOGY

## 2024-09-13 PROCEDURE — 80076 HEPATIC FUNCTION PANEL: CPT

## 2024-09-13 PROCEDURE — 84156 ASSAY OF PROTEIN URINE: CPT

## 2024-09-13 PROCEDURE — 83615 LACTATE (LD) (LDH) ENZYME: CPT

## 2024-09-13 PROCEDURE — 76816 OB US FOLLOW-UP PER FETUS: CPT | Performed by: OBSTETRICS & GYNECOLOGY

## 2024-09-13 PROCEDURE — 82565 ASSAY OF CREATININE: CPT

## 2024-09-13 PROCEDURE — 99222 1ST HOSP IP/OBS MODERATE 55: CPT | Performed by: OBSTETRICS & GYNECOLOGY

## 2024-09-13 RX ORDER — SODIUM CHLORIDE 0.9 % (FLUSH) 0.9 %
5-40 SYRINGE (ML) INJECTION EVERY 12 HOURS SCHEDULED
Status: DISCONTINUED | OUTPATIENT
Start: 2024-09-13 | End: 2024-09-14 | Stop reason: HOSPADM

## 2024-09-13 RX ORDER — SODIUM CHLORIDE, SODIUM LACTATE, POTASSIUM CHLORIDE, CALCIUM CHLORIDE 600; 310; 30; 20 MG/100ML; MG/100ML; MG/100ML; MG/100ML
INJECTION, SOLUTION INTRAVENOUS CONTINUOUS
Status: DISCONTINUED | OUTPATIENT
Start: 2024-09-13 | End: 2024-09-13

## 2024-09-13 RX ORDER — POLYETHYLENE GLYCOL 3350 17 G/17G
17 POWDER, FOR SOLUTION ORAL DAILY PRN
Status: DISCONTINUED | OUTPATIENT
Start: 2024-09-13 | End: 2024-09-14 | Stop reason: HOSPADM

## 2024-09-13 RX ORDER — SODIUM CHLORIDE 0.9 % (FLUSH) 0.9 %
5-40 SYRINGE (ML) INJECTION PRN
Status: DISCONTINUED | OUTPATIENT
Start: 2024-09-13 | End: 2024-09-14 | Stop reason: HOSPADM

## 2024-09-13 RX ORDER — NIFEDIPINE 10 MG/1
CAPSULE ORAL
Status: COMPLETED
Start: 2024-09-13 | End: 2024-09-13

## 2024-09-13 RX ORDER — NIFEDIPINE 30 MG/1
30 TABLET, EXTENDED RELEASE ORAL DAILY
Status: DISCONTINUED | OUTPATIENT
Start: 2024-09-13 | End: 2024-09-13

## 2024-09-13 RX ORDER — ONDANSETRON 4 MG/1
4 TABLET, ORALLY DISINTEGRATING ORAL EVERY 8 HOURS PRN
Status: DISCONTINUED | OUTPATIENT
Start: 2024-09-13 | End: 2024-09-14 | Stop reason: HOSPADM

## 2024-09-13 RX ORDER — SWAB
1 SWAB, NON-MEDICATED MISCELLANEOUS DAILY
Status: DISCONTINUED | OUTPATIENT
Start: 2024-09-13 | End: 2024-09-14 | Stop reason: HOSPADM

## 2024-09-13 RX ORDER — ONDANSETRON 2 MG/ML
4 INJECTION INTRAMUSCULAR; INTRAVENOUS EVERY 6 HOURS PRN
Status: DISCONTINUED | OUTPATIENT
Start: 2024-09-13 | End: 2024-09-14 | Stop reason: HOSPADM

## 2024-09-13 RX ORDER — SODIUM CHLORIDE 9 MG/ML
INJECTION, SOLUTION INTRAVENOUS PRN
Status: DISCONTINUED | OUTPATIENT
Start: 2024-09-13 | End: 2024-09-14 | Stop reason: HOSPADM

## 2024-09-13 RX ORDER — NIFEDIPINE 30 MG/1
30 TABLET, EXTENDED RELEASE ORAL 2 TIMES DAILY
Status: DISCONTINUED | OUTPATIENT
Start: 2024-09-14 | End: 2024-09-14 | Stop reason: HOSPADM

## 2024-09-13 RX ORDER — NIFEDIPINE 10 MG/1
10 CAPSULE ORAL EVERY 8 HOURS SCHEDULED
Status: DISCONTINUED | OUTPATIENT
Start: 2024-09-13 | End: 2024-09-13

## 2024-09-13 RX ADMIN — NIFEDIPINE 10 MG: 10 CAPSULE ORAL at 16:08

## 2024-09-13 RX ADMIN — NIFEDIPINE 30 MG: 30 TABLET, FILM COATED, EXTENDED RELEASE ORAL at 17:09

## 2024-09-14 VITALS
DIASTOLIC BLOOD PRESSURE: 81 MMHG | OXYGEN SATURATION: 100 % | WEIGHT: 170 LBS | HEART RATE: 91 BPM | BODY MASS INDEX: 33.38 KG/M2 | TEMPERATURE: 98.4 F | SYSTOLIC BLOOD PRESSURE: 149 MMHG | HEIGHT: 60 IN | RESPIRATION RATE: 18 BRPM

## 2024-09-14 LAB
COLLECT DURATION TIME UR: 12 HR
PROTEIN [MASS/TIME] IN 12 HOUR URINE: 287
SPECIMEN VOL ?TM UR: 2050 ML

## 2024-09-14 PROCEDURE — 99238 HOSP IP/OBS DSCHRG MGMT 30/<: CPT | Performed by: OBSTETRICS & GYNECOLOGY

## 2024-09-14 PROCEDURE — G0378 HOSPITAL OBSERVATION PER HR: HCPCS

## 2024-09-14 PROCEDURE — 6370000000 HC RX 637 (ALT 250 FOR IP): Performed by: OBSTETRICS & GYNECOLOGY

## 2024-09-14 PROCEDURE — 59025 FETAL NON-STRESS TEST: CPT

## 2024-09-14 RX ORDER — SWAB
1 SWAB, NON-MEDICATED MISCELLANEOUS DAILY
Qty: 30 TABLET | Refills: 0 | Status: SHIPPED | OUTPATIENT
Start: 2024-09-14

## 2024-09-14 RX ADMIN — NIFEDIPINE 30 MG: 30 TABLET, FILM COATED, EXTENDED RELEASE ORAL at 08:53

## 2024-09-14 RX ADMIN — Medication 1 TABLET: at 08:53

## 2024-09-17 ENCOUNTER — TELEPHONE (OUTPATIENT)
Age: 24
End: 2024-09-17

## 2024-09-26 ENCOUNTER — HOSPITAL ENCOUNTER (EMERGENCY)
Facility: HOSPITAL | Age: 24
Discharge: HOME | End: 2024-09-26
Payer: MEDICAID

## 2024-09-26 VITALS
RESPIRATION RATE: 17 BRPM | DIASTOLIC BLOOD PRESSURE: 80 MMHG | HEART RATE: 86 BPM | SYSTOLIC BLOOD PRESSURE: 136 MMHG | TEMPERATURE: 97.9 F

## 2024-09-26 LAB
ALBUMIN SERPL-MCNC: 2.8 G/DL (ref 3.5–5)
ALBUMIN/GLOB SERPL: 0.8 (ref 1.1–2.2)
ALP SERPL-CCNC: 155 U/L (ref 45–117)
ALT SERPL-CCNC: 24 U/L (ref 12–78)
ANION GAP SERPL CALC-SCNC: 9 MMOL/L (ref 2–12)
AST SERPL-CCNC: 17 U/L (ref 15–37)
BASOPHILS # BLD: 0 K/UL (ref 0–0.1)
BASOPHILS NFR BLD: 0 % (ref 0–1)
BILIRUB SERPL-MCNC: 0.3 MG/DL (ref 0.2–1)
BUN SERPL-MCNC: 6 MG/DL (ref 6–20)
BUN/CREAT SERPL: 11 (ref 12–20)
CALCIUM SERPL-MCNC: 9.2 MG/DL (ref 8.5–10.1)
CHLORIDE SERPL-SCNC: 105 MMOL/L (ref 97–108)
CO2 SERPL-SCNC: 24 MMOL/L (ref 21–32)
CREAT SERPL-MCNC: 0.54 MG/DL (ref 0.55–1.02)
CREAT UR-MCNC: 239 MG/DL
DIFFERENTIAL METHOD BLD: ABNORMAL
EOSINOPHIL # BLD: 0 K/UL (ref 0–0.4)
EOSINOPHIL NFR BLD: 0 % (ref 0–7)
ERYTHROCYTE [DISTWIDTH] IN BLOOD BY AUTOMATED COUNT: 16 % (ref 11.5–14.5)
GLOBULIN SER CALC-MCNC: 3.5 G/DL (ref 2–4)
GLUCOSE SERPL-MCNC: 85 MG/DL (ref 65–100)
HCT VFR BLD AUTO: 35.5 % (ref 35–47)
HGB BLD-MCNC: 11.4 G/DL (ref 11.5–16)
IMM GRANULOCYTES # BLD AUTO: 0.2 K/UL (ref 0–0.04)
IMM GRANULOCYTES NFR BLD AUTO: 1 % (ref 0–0.5)
LDH SERPL L TO P-CCNC: 200 U/L (ref 81–246)
LYMPHOCYTES # BLD: 1.8 K/UL (ref 0.8–3.5)
LYMPHOCYTES NFR BLD: 11 % (ref 12–49)
MCH RBC QN AUTO: 26.3 PG (ref 26–34)
MCHC RBC AUTO-ENTMCNC: 32.1 G/DL (ref 30–36.5)
MCV RBC AUTO: 81.8 FL (ref 80–99)
MONOCYTES # BLD: 1.3 K/UL (ref 0–1)
MONOCYTES NFR BLD: 8 % (ref 5–13)
NEUTS SEG # BLD: 12.7 K/UL (ref 1.8–8)
NEUTS SEG NFR BLD: 80 % (ref 32–75)
NRBC # BLD: 0 K/UL (ref 0–0.01)
NRBC BLD-RTO: 0 PER 100 WBC
PLATELET # BLD AUTO: 249 K/UL (ref 150–400)
PMV BLD AUTO: 9.5 FL (ref 8.9–12.9)
POTASSIUM SERPL-SCNC: 3.6 MMOL/L (ref 3.5–5.1)
PROT SERPL-MCNC: 6.3 G/DL (ref 6.4–8.2)
PROT UR-MCNC: 52 MG/DL (ref 0–11.9)
PROT/CREAT UR-RTO: 0.2
RBC # BLD AUTO: 4.34 M/UL (ref 3.8–5.2)
SODIUM SERPL-SCNC: 138 MMOL/L (ref 136–145)
URATE SERPL-MCNC: 5 MG/DL (ref 2.6–6)
WBC # BLD AUTO: 16.1 K/UL (ref 3.6–11)

## 2024-09-26 PROCEDURE — 83615 LACTATE (LD) (LDH) ENZYME: CPT

## 2024-09-26 PROCEDURE — 82570 ASSAY OF URINE CREATININE: CPT

## 2024-09-26 PROCEDURE — 36415 COLL VENOUS BLD VENIPUNCTURE: CPT

## 2024-09-26 PROCEDURE — 84550 ASSAY OF BLOOD/URIC ACID: CPT

## 2024-09-26 PROCEDURE — 84156 ASSAY OF PROTEIN URINE: CPT

## 2024-09-26 PROCEDURE — 99285 EMERGENCY DEPT VISIT HI MDM: CPT

## 2024-09-26 PROCEDURE — 4500000002 HC ER NO CHARGE

## 2024-09-26 PROCEDURE — 80053 COMPREHEN METABOLIC PANEL: CPT

## 2024-09-26 PROCEDURE — 6370000000 HC RX 637 (ALT 250 FOR IP): Performed by: ADVANCED PRACTICE MIDWIFE

## 2024-09-26 PROCEDURE — 85025 COMPLETE CBC W/AUTO DIFF WBC: CPT

## 2024-09-26 RX ORDER — NIFEDIPINE 30 MG/1
30 TABLET, EXTENDED RELEASE ORAL
Status: COMPLETED | OUTPATIENT
Start: 2024-09-26 | End: 2024-09-26

## 2024-09-26 RX ADMIN — NIFEDIPINE 30 MG: 30 TABLET, EXTENDED RELEASE ORAL at 22:46

## 2024-09-27 ENCOUNTER — TELEPHONE (OUTPATIENT)
Age: 24
End: 2024-09-27

## 2024-10-01 ENCOUNTER — ROUTINE PRENATAL (OUTPATIENT)
Age: 24
End: 2024-10-01

## 2024-10-01 ENCOUNTER — HOSPITAL ENCOUNTER (INPATIENT)
Facility: HOSPITAL | Age: 24
LOS: 10 days | Discharge: HOME | End: 2024-10-11
Attending: OBSTETRICS & GYNECOLOGY | Admitting: OBSTETRICS & GYNECOLOGY
Payer: MEDICAID

## 2024-10-01 VITALS
TEMPERATURE: 97.9 F | RESPIRATION RATE: 18 BRPM | OXYGEN SATURATION: 98 % | DIASTOLIC BLOOD PRESSURE: 84 MMHG | HEIGHT: 59 IN | SYSTOLIC BLOOD PRESSURE: 142 MMHG | BODY MASS INDEX: 34.47 KG/M2 | WEIGHT: 171 LBS | HEART RATE: 112 BPM

## 2024-10-01 DIAGNOSIS — Z34.90 PREGNANCY, UNSPECIFIED GESTATIONAL AGE: Primary | ICD-10-CM

## 2024-10-01 PROBLEM — O11.9 CHRONIC HYPERTENSION WITH SUPERIMPOSED PREECLAMPSIA: Status: ACTIVE | Noted: 2024-10-01

## 2024-10-01 LAB
ALBUMIN SERPL-MCNC: 2.7 G/DL (ref 3.5–5)
ALBUMIN/GLOB SERPL: 0.8 (ref 1.1–2.2)
ALP SERPL-CCNC: 166 U/L (ref 45–117)
ALT SERPL-CCNC: 38 U/L (ref 12–78)
ANION GAP SERPL CALC-SCNC: 6 MMOL/L (ref 2–12)
AST SERPL-CCNC: 30 U/L (ref 15–37)
BASOPHILS # BLD: 0 K/UL (ref 0–0.1)
BASOPHILS NFR BLD: 0 % (ref 0–1)
BILIRUB SERPL-MCNC: 0.3 MG/DL (ref 0.2–1)
BUN SERPL-MCNC: 4 MG/DL (ref 6–20)
BUN/CREAT SERPL: 9 (ref 12–20)
CALCIUM SERPL-MCNC: 9.3 MG/DL (ref 8.5–10.1)
CHLORIDE SERPL-SCNC: 106 MMOL/L (ref 97–108)
CLUE CELLS VAG QL WET PREP: ABNORMAL
CO2 SERPL-SCNC: 24 MMOL/L (ref 21–32)
CREAT SERPL-MCNC: 0.47 MG/DL (ref 0.55–1.02)
CREAT UR-MCNC: 152 MG/DL
DIFFERENTIAL METHOD BLD: ABNORMAL
EOSINOPHIL # BLD: 0 K/UL (ref 0–0.4)
EOSINOPHIL NFR BLD: 0 % (ref 0–7)
ERYTHROCYTE [DISTWIDTH] IN BLOOD BY AUTOMATED COUNT: 16 % (ref 11.5–14.5)
GLOBULIN SER CALC-MCNC: 3.5 G/DL (ref 2–4)
GLUCOSE SERPL-MCNC: 89 MG/DL (ref 65–100)
HCT VFR BLD AUTO: 34.8 % (ref 35–47)
HGB BLD-MCNC: 11.3 G/DL (ref 11.5–16)
IMM GRANULOCYTES # BLD AUTO: 0.1 K/UL (ref 0–0.04)
IMM GRANULOCYTES NFR BLD AUTO: 1 % (ref 0–0.5)
LDH SERPL L TO P-CCNC: 156 U/L (ref 81–246)
LYMPHOCYTES # BLD: 1.4 K/UL (ref 0.8–3.5)
LYMPHOCYTES NFR BLD: 11 % (ref 12–49)
MCH RBC QN AUTO: 26.5 PG (ref 26–34)
MCHC RBC AUTO-ENTMCNC: 32.5 G/DL (ref 30–36.5)
MCV RBC AUTO: 81.5 FL (ref 80–99)
MONOCYTES # BLD: 1.2 K/UL (ref 0–1)
MONOCYTES NFR BLD: 9 % (ref 5–13)
NEUTS SEG # BLD: 9.9 K/UL (ref 1.8–8)
NEUTS SEG NFR BLD: 79 % (ref 32–75)
NRBC # BLD: 0 K/UL (ref 0–0.01)
NRBC BLD-RTO: 0 PER 100 WBC
PLATELET # BLD AUTO: 274 K/UL (ref 150–400)
PMV BLD AUTO: 9.9 FL (ref 8.9–12.9)
POTASSIUM SERPL-SCNC: 3.8 MMOL/L (ref 3.5–5.1)
PROT SERPL-MCNC: 6.2 G/DL (ref 6.4–8.2)
PROT UR-MCNC: 31 MG/DL (ref 0–11.9)
PROT/CREAT UR-RTO: 0.2
RBC # BLD AUTO: 4.27 M/UL (ref 3.8–5.2)
SODIUM SERPL-SCNC: 136 MMOL/L (ref 136–145)
T VAGINALIS VAG QL WET PREP: ABNORMAL
WBC # BLD AUTO: 12.6 K/UL (ref 3.6–11)
YEAST: ABNORMAL

## 2024-10-01 PROCEDURE — 84156 ASSAY OF PROTEIN URINE: CPT

## 2024-10-01 PROCEDURE — 0502F SUBSEQUENT PRENATAL CARE: CPT | Performed by: OBSTETRICS & GYNECOLOGY

## 2024-10-01 PROCEDURE — 36415 COLL VENOUS BLD VENIPUNCTURE: CPT

## 2024-10-01 PROCEDURE — 59025 FETAL NON-STRESS TEST: CPT

## 2024-10-01 PROCEDURE — 87081 CULTURE SCREEN ONLY: CPT

## 2024-10-01 PROCEDURE — 1120000000 HC RM PRIVATE OB

## 2024-10-01 PROCEDURE — 85025 COMPLETE CBC W/AUTO DIFF WBC: CPT

## 2024-10-01 PROCEDURE — 80053 COMPREHEN METABOLIC PANEL: CPT

## 2024-10-01 PROCEDURE — G0379 DIRECT REFER HOSPITAL OBSERV: HCPCS

## 2024-10-01 PROCEDURE — 82570 ASSAY OF URINE CREATININE: CPT

## 2024-10-01 PROCEDURE — G0378 HOSPITAL OBSERVATION PER HR: HCPCS

## 2024-10-01 PROCEDURE — 6360000002 HC RX W HCPCS: Performed by: OBSTETRICS & GYNECOLOGY

## 2024-10-01 PROCEDURE — 87210 SMEAR WET MOUNT SALINE/INK: CPT

## 2024-10-01 PROCEDURE — 6370000000 HC RX 637 (ALT 250 FOR IP): Performed by: OBSTETRICS & GYNECOLOGY

## 2024-10-01 PROCEDURE — 83615 LACTATE (LD) (LDH) ENZYME: CPT

## 2024-10-01 PROCEDURE — 99222 1ST HOSP IP/OBS MODERATE 55: CPT | Performed by: OBSTETRICS & GYNECOLOGY

## 2024-10-01 RX ORDER — BETAMETHASONE SODIUM PHOSPHATE AND BETAMETHASONE ACETATE 3; 3 MG/ML; MG/ML
12 INJECTION, SUSPENSION INTRA-ARTICULAR; INTRALESIONAL; INTRAMUSCULAR; SOFT TISSUE EVERY 24 HOURS
Status: COMPLETED | OUTPATIENT
Start: 2024-10-01 | End: 2024-10-02

## 2024-10-01 RX ORDER — NIFEDIPINE 30 MG/1
30 TABLET, EXTENDED RELEASE ORAL 2 TIMES DAILY
Status: DISCONTINUED | OUTPATIENT
Start: 2024-10-01 | End: 2024-10-08

## 2024-10-01 RX ORDER — ONDANSETRON 4 MG/1
4 TABLET, ORALLY DISINTEGRATING ORAL EVERY 8 HOURS PRN
Status: DISCONTINUED | OUTPATIENT
Start: 2024-10-01 | End: 2024-10-07

## 2024-10-01 RX ORDER — SWAB
1 SWAB, NON-MEDICATED MISCELLANEOUS DAILY
Status: DISCONTINUED | OUTPATIENT
Start: 2024-10-01 | End: 2024-10-08

## 2024-10-01 RX ORDER — ONDANSETRON 2 MG/ML
4 INJECTION INTRAMUSCULAR; INTRAVENOUS EVERY 6 HOURS PRN
Status: DISCONTINUED | OUTPATIENT
Start: 2024-10-01 | End: 2024-10-07

## 2024-10-01 RX ORDER — POLYETHYLENE GLYCOL 3350 17 G/17G
17 POWDER, FOR SOLUTION ORAL DAILY PRN
Status: DISCONTINUED | OUTPATIENT
Start: 2024-10-01 | End: 2024-10-08

## 2024-10-01 RX ADMIN — BETAMETHASONE ACETATE AND BETAMETHASONE SODIUM PHOSPHATE 12 MG: 3; 3 INJECTION, SUSPENSION INTRA-ARTICULAR; INTRALESIONAL; INTRAMUSCULAR; SOFT TISSUE at 16:18

## 2024-10-01 RX ADMIN — NIFEDIPINE 30 MG: 30 TABLET, FILM COATED, EXTENDED RELEASE ORAL at 20:50

## 2024-10-01 RX ADMIN — Medication 1 TABLET: at 18:50

## 2024-10-01 SDOH — ECONOMIC STABILITY: FOOD INSECURITY: WITHIN THE PAST 12 MONTHS, YOU WORRIED THAT YOUR FOOD WOULD RUN OUT BEFORE YOU GOT MONEY TO BUY MORE.: NEVER TRUE

## 2024-10-01 SDOH — ECONOMIC STABILITY: FOOD INSECURITY: WITHIN THE PAST 12 MONTHS, THE FOOD YOU BOUGHT JUST DIDN'T LAST AND YOU DIDN'T HAVE MONEY TO GET MORE.: NEVER TRUE

## 2024-10-01 SDOH — ECONOMIC STABILITY: INCOME INSECURITY: HOW HARD IS IT FOR YOU TO PAY FOR THE VERY BASICS LIKE FOOD, HOUSING, MEDICAL CARE, AND HEATING?: NOT HARD AT ALL

## 2024-10-01 ASSESSMENT — PAIN SCALES - GENERAL: PAINLEVEL_OUTOF10: 0

## 2024-10-01 NOTE — CONSULTS
MATERNAL FETAL MEDICINE  INPATIENT CONSULTATION    REQUESTED BY: Monica Hoffman MD   DATE OF CONSULT: 10/01/24    REASON FOR CONSULTATION: Mervin Naylor is a 24 y.o. female  at 36w0d.    HPI  Patient is a cHTN that presented to clinic with elevated blood pressures with systolics in the 150s. She had repeat preE labs which show elevated Pr/Cr ratio which is different from her baseline. She denies and signs of severe preE at this time and overall feels well.     Has good fetal movement, denies vaginal bleeding or rupture of membranes.     Patient Active Problem List   Diagnosis    Chronic headaches    Pregnancy    HTN (hypertension), benign    Elevated blood pressure affecting pregnancy in third trimester, antepartum       Past Medical History:   Diagnosis Date    Asthma     in the past    Chronic hypertension with superimposed pre-eclampsia 2022    Depression     in the past- no meds    Eczema     Encounter for supervision pregnancy in primigravida, antepartum 2021    Primary Provider: Yasmin RODRIGUEZ  EDC by 13w US  Pregnancy problems:  Hypertension: hereditary, predates pregnancy; never been on medication.   Start on labetalol 100mg bid at 13 weeks---> increase to 200 bid  on   baby Aspirin (not taking consistently), check baseline preE labs normal   and baseline urine p/c ratio 0.3  24 hr urine___  Growth 4 weeks in third tri ___, fetal surv at 32 weeks ___      Gestational hypertension     Hypertension     Second hand smoke exposure     Vision decreased     glasses used, c/o vision problems       History reviewed. No pertinent surgical history.    OB History    Para Term  AB Living   2 1   1   1   SAB IAB Ectopic Molar Multiple Live Births             1      # Outcome Date GA Lbr Pipo/2nd Weight Sex Type Anes PTL Lv   2 Current            1  22 35w2d 04:40 / 00:06 2.43 kg (5 lb 5.7 oz) M Vag-Spont Local Y JEFFREY       No Known Allergies    No current

## 2024-10-01 NOTE — PROGRESS NOTES
Has been taking bp's at home and relays they have been consitently elevated even while taking the medication. Also relays some fear about the elevated bp's  Active FM  Denies lof, vb some [facility]

## 2024-10-01 NOTE — PROGRESS NOTES
1954: Bedside and Verbal shift change report given to SOPHIE Herrera (oncoming nurse) by SOPHIE aTtum (offgoing nurse). Report included the following information Nurse Handoff Report, Index, Adult Overview, MAR, and Recent Results.      2028: pt placed on FHM for routine NST.     2100: Patient education provided regarding prescribed medication including purpose, dosage, potential side effects, and administration instructions. Patient verbalized understanding.

## 2024-10-01 NOTE — PROGRESS NOTES
1200 Pt arrived to LDR 4 for elevated BP. Plan is to do serial Bps, send off PI labs.    1441 Perfect serve from Dr. Hoffman was sent.    1512 Dr. Hoffman said okay to do q2hr Bps. Would like to start beta.     1530 Report given to NABIL Mooney RN

## 2024-10-01 NOTE — H&P
Department of Obstetrics and Gynecology   Obstetrics History and Physical  H&P Admission Inpatient  Note        HISTORY OF PRESENT ILLNESS:      The patient is a 24 y.o. female at 36w0d who presents from the office for evaluation of elevated blood pressure.  She relays she has been taking her nifedipine 30mg XR bid as directed at home, and has still been getting elevated blood pressures at home 150s/80-90s.      She reports intermittent headaches and floaters for weeks.   She denies LE edema or RUQ pain.   She reports active fetal movements and denies signs of labor.       PRENATAL CARE:  Patient Active Problem List    Diagnosis Date Noted    Elevated blood pressure affecting pregnancy in third trimester, antepartum 2024    HTN (hypertension), benign 2024    Pregnancy 05/10/2024     Primary Provider: Yasmin [id number], PTSVD x1 at 35 weeks (5lb 6oz)  EDC by D=15 wk US    Pregnancy problems:  CHTN: not followed by a PCP outside of pregnancy.  Not consistently complaint with medication this pregnancy.  Developed SI preE in G1 at 35 weeks which warranted delivery.  Ld aspirin sent. Baseline labs WNL and baseline urine p/c 0.1. /100 at first visit.  Rx nifedipine 30XR sent 5/10. Not taking until , 9/3 rx for bid sent.   - following with MFM for serial growth and weekly  surveillance  -  24 urine protein: 287mg  -  MFM EFW at 33 weeks; 44%ile, PRISCILA 16, cephalic  Marijuana use: discussed cessation  Trichomonas pos: dx at first visit. Sent flagyl  -->  appt pt still taking the medication. ROBERT  neg. Recheck 36 weeks___      IOB labs: O Pos, normal, VZV NI --> rec PP vacc. Urine cult neg, trich pos, GC/Chl neg  Genetic Screening: NIPT low risk BOY!!, declines carrier screening  Anatomy: BOY!! Normal anatomy, posterior placenta  Flu:  TDAP: declines  Third Tri Labs: refused glucola advised accuchecks at home but pt did not perform. 9/3 fasting gluc 83 and Hgb A1C 5.2%.  Hgb 11.1  covered by insurance (Patient not taking: Reported on 10/1/2024)  Lancets MISC, 1 each by Does not apply route 4 times daily Use as directed to check blood glucose level at home 4 times daily: fasting, and 1hr after each meal please send in blood sugar supplies covered by insurance (Patient not taking: Reported on 10/1/2024)  NIFEdipine (ADALAT CC) 30 MG extended release tablet, Take 1 tablet by mouth in the morning and at bedtime  blood glucose monitor kit and supplies, Dispense sufficient amount for indicated testing frequency plus additional to accommodate PRN testing needs. Dispense all needed supplies to include: monitor, strips, lancing device, lancets, control solutions, alcohol swabs.  Pt will be testing 4 x daily  Blood Pressure Monitoring (BLOOD PRESSURE CUFF) MISC, 1 Application by Does not apply route daily  aspirin 81 MG EC tablet, Take 1 tablet by mouth daily    REVIEW OF SYSTEMS:    CONSTITUTIONAL:  negative  RESPIRATORY:  negative  CARDIOVASCULAR:  negative  GASTROINTESTINAL:  negative  ALLERGIC/IMMUNOLOGIC:  negative  NEUROLOGICAL:  negative  BEHAVIOR/PSYCH:  negative    PHYSICAL EXAM:  Vitals:    10/01/24 1254 10/01/24 1339 10/01/24 1355 10/01/24 1418   BP: (!) 153/93 (!) 153/96 (!) 155/91 (!) 150/77   Pulse: 94 89 88 90   Resp:       Temp:       TempSrc:         General appearance:  awake, alert, cooperative, no apparent distress, and appears stated age  Neurologic:  Awake, alert, oriented to name, place and time.    Lungs:  No increased work of breathing, good air exchange  Abdomen:  Soft, non tender, gravid, consistent with her gestational age    Cervix:     Membranes:  Intact    Labs:       Results for orders placed or performed during the hospital encounter of 10/01/24   CBC with Auto Differential   Result Value Ref Range    WBC 12.6 (H) 3.6 - 11.0 K/uL    RBC 4.27 3.80 - 5.20 M/uL    Hemoglobin 11.3 (L) 11.5 - 16.0 g/dL    Hematocrit 34.8 (L) 35.0 - 47.0 %    MCV 81.5 80.0 - 99.0 FL    [facility]

## 2024-10-01 NOTE — PROGRESS NOTES
1530: SBAR report from JULIANNE Butts RN. Assuming care of patient at this time   1543: Order for BID NST from Dr. Hoffman.   1618: Beta given  1700: GBS and trich swab sent down. Dr. Hoffman at bedside. SVE closed/posterior   1711: Dr. Matthews at bedside. See progress note. Patient ready to be transferred to [facility] until delivery at 37 weeks.  1810: TRANSFER - OUT REPORT:    Verbal report given to Suzi BARROIS on Cole Naylor  being transferred to [facility] for routine progression of patient care       Report consisted of patient's Situation, Background, Assessment and   Recommendations(SBAR).     Information from the following report(s) Nurse Handoff Report, Adult Overview, Recent Results, and Event Log was reviewed with the receiving nurse.           Lines:       Opportunity for questions and clarification was provided.      Patient transported with:  Registered Nurse

## 2024-10-01 NOTE — PROGRESS NOTES
Interval Progress Note    Cole is feeling well and denies current complaints.     Pt's Bps have been mild range - up to 150/100s.    Serology normal.  Urine p/c ratio 0.2 (unchanged).    Diagnosis of superimposed pre-eclampsia on CHTN.   Cont nifedipine 30 XR bid which is her home regimen, do not increase regimen as to not mask progression of severe disease.   BID NSTs   BMZ course, first dose given  MFM consulted and will weigh in  Transfer to Antepartum unit until delivery at 37w0d      Monica Hoffman MD

## 2024-10-01 NOTE — PROGRESS NOTES
Routine prenatal visit today.  Normal fetal movements.  She is taking her nifedipine 30XR bid.  Relays her Bps at home 127-164/  She reports daily headaches since she started taking iron.  She also reports seeing floaters occasionally which is not unusual for her, but they are worse now.   Denies edema and RUQ.    Has not seen MFM since 9/13.   Went to [facility] on 9/26 from home due to elevated blood pressure readings at home.   Discussed the situation with her and recommend evaluation in L&D.  Discussed possible IOL if indicated versus inpatient monitoring until IOL at 37 weeks. She understands.

## 2024-10-02 LAB
EKG ATRIAL RATE: 106 BPM
EKG DIAGNOSIS: NORMAL
EKG P AXIS: 47 DEGREES
EKG P-R INTERVAL: 134 MS
EKG Q-T INTERVAL: 326 MS
EKG QRS DURATION: 76 MS
EKG QTC CALCULATION (BAZETT): 433 MS
EKG R AXIS: 43 DEGREES
EKG T AXIS: 7 DEGREES
EKG VENTRICULAR RATE: 106 BPM

## 2024-10-02 PROCEDURE — 99232 SBSQ HOSP IP/OBS MODERATE 35: CPT | Performed by: OBSTETRICS & GYNECOLOGY

## 2024-10-02 PROCEDURE — 1120000000 HC RM PRIVATE OB

## 2024-10-02 PROCEDURE — 59025 FETAL NON-STRESS TEST: CPT

## 2024-10-02 PROCEDURE — 6370000000 HC RX 637 (ALT 250 FOR IP): Performed by: OBSTETRICS & GYNECOLOGY

## 2024-10-02 PROCEDURE — 6360000002 HC RX W HCPCS: Performed by: OBSTETRICS & GYNECOLOGY

## 2024-10-02 PROCEDURE — 93005 ELECTROCARDIOGRAM TRACING: CPT | Performed by: OBSTETRICS & GYNECOLOGY

## 2024-10-02 PROCEDURE — 59025 FETAL NON-STRESS TEST: CPT | Performed by: OBSTETRICS & GYNECOLOGY

## 2024-10-02 RX ORDER — METRONIDAZOLE 7.5 MG/G
GEL TOPICAL 2 TIMES DAILY
Status: CANCELLED | OUTPATIENT
Start: 2024-10-02

## 2024-10-02 RX ORDER — METRONIDAZOLE 7.5 MG/G
GEL VAGINAL NIGHTLY
Status: COMPLETED | OUTPATIENT
Start: 2024-10-02 | End: 2024-10-06

## 2024-10-02 RX ADMIN — NIFEDIPINE 30 MG: 30 TABLET, FILM COATED, EXTENDED RELEASE ORAL at 16:24

## 2024-10-02 RX ADMIN — METRONIDAZOLE: 7.5 GEL VAGINAL at 20:33

## 2024-10-02 RX ADMIN — Medication 1 TABLET: at 08:39

## 2024-10-02 RX ADMIN — BETAMETHASONE ACETATE AND BETAMETHASONE SODIUM PHOSPHATE 12 MG: 3; 3 INJECTION, SUSPENSION INTRA-ARTICULAR; INTRALESIONAL; INTRAMUSCULAR; SOFT TISSUE at 16:21

## 2024-10-02 RX ADMIN — NIFEDIPINE 30 MG: 30 TABLET, FILM COATED, EXTENDED RELEASE ORAL at 08:39

## 2024-10-02 ASSESSMENT — PAIN SCALES - GENERAL: PAINLEVEL_OUTOF10: 0

## 2024-10-02 NOTE — CARE COORDINATION
Transition of Care Plan:    RUR: 8%--low  Prior Level of Functioning: independent  Disposition: home  Follow up appointments: OB/GYN  DME needed: N/A  Transportation at discharge: in car w/family  Caregiver Contact: Keeley Nolasco, mauricio, [phone]  Discharge Caregiver contacted prior to discharge? N/A  Care Conference needed? no  Barriers to discharge:  clinical status       10/02/24 0957   Readmission Assessment   Number of Days since last admission? 8-30 days  ([facility] 9/13-9/14)   Previous Disposition Home with Family   Who is being Interviewed Patient  (medical record)   What was the patient's/caregiver's perception as to why they think they needed to return back to the hospital? Other (Comment)  (complications of pregnancy)   Did you visit your Primary Care Physician after you left the hospital, before you returned this time? No   Why weren't you able to visit your PCP? Did not have an appointment  (no PCP noted)   Did you see a specialist, such as Cardiac, Pulmonary, Orthopedic Physician, etc. after you left the hospital? Yes   Who advised the patient to return to the hospital? Physician   Does the patient report anything that got in the way of taking their medications? No   In our efforts to provide the best possible care to you and others like you, can you think of anything that we could have done to help you after you left the hospital the first time, so that you might not have needed to return so soon? Other (Comment)  (complications of pregnancy)     Maria Vegas LMSW  Care Management [facility]  [phone]

## 2024-10-02 NOTE — PROGRESS NOTES
Ante Partum Progress Note    Joseantwonyaima Naylor  36w1d    Assessment and Plan: 36w1d   CHTN with super-imposed pre-eclampsia without severe features.  Bps up to 150/100s on admission. Urine p/c increased to 0.2 up from 0.1 which was her baseline.  Serology HTN labs were normal. Bps now normal to low mild range elevation.   - continue inpatient management until delivery  - MFM consulted 10/1; appreciate their assistance  - BMZ course in process in the case a late  delivery is warranted  - bid NSTs  - repeat labs every 3 days, next due 10/4  - cont nifedipine 30mg XR bid, will not increase medication regimen to not mask progression to severe disease  - plan delivery at 37w0d, sooner as indicated    36 week gestation: normal EFW with MFM 2 weeks ago  - GBS swab pending  - prior trichomonas in pregnancy; test of cure negative on  and again on 10/1. Clue cells seen on wet prep 10/1, so will treat empirically for BV given delivery planned next week    Heart palpitations: likely physiologic of pregnancy.  order ECG this morning      Orders/Charges: Medium and NST     S:  Patient did well overnight, no acute issues. She reports a slight headache this morning which she attributes to the nifedipine medication. Active fetal movements.  She relays that she will occasionally feel weak and like her heart is racing.      Vitals:  BP (!) 142/94   Pulse (!) 127   Temp 98.2 °F (36.8 °C) (Oral)   Resp 16   LMP 2024 (Exact Date)   SpO2 99%   Temp (24hrs), Av.1 °F (36.7 °C), Min:97.9 °F (36.6 °C), Max:98.4 °F (36.9 °C)      Last 24hr Input/Output:  No intake or output data in the 24 hours ending 10/02/24 0848     Non stress test:   10/2 AM  Non-stress test  Indication: SI preE  FHR: baseline 140, moderate variability, accelerations present, no decelerations  Mooar: contractions irregular  Duration monitored: >30 minutes  Interpretation: reactive and reassuring, Category 1        No data found. No data found.

## 2024-10-02 NOTE — PROGRESS NOTES
Bedside and Verbal shift change report given to KAYCEE George RN (oncoming nurse) by YAMILE Soto RN (offgoing nurse). Report included the following information Nurse Handoff Report, Index, Adult Overview, Intake/Output, MAR, and Recent Results.    0830- Patient tachy      0959- Patient placed on EFM for NST.     1015- Unable to determine baseline as FHR not continuously traceable. Patient placed on pulse ox to differentiate between maternal and fetal HR and ultrasound repositioned      .

## 2024-10-02 NOTE — CARE COORDINATION
Care Management Initial Assessment       RUR: 8%--low  Readmission? Yes - 9/13-9/14/24 [facility]  1st IM letter given? No  1st  letter given: No    Patient admitted from OB office on 10/1 at 36w GA for chronic hypertension in pregnancy. Patient was admitted at [facility] 913-9/14 for chronic HTN at 33w GA. Per chart notes, patient is not followed by a PCP. She delivered her first baby at 35w GA. Plan is to remain hospitalized until 37w at which time labor will be induced. No CM needs noted at this time.     10/02/24 0921   Service Assessment   Patient Orientation Alert and Oriented   Cognition Alert   History Provided By Medical Record   Primary Caregiver Self   Support Systems Parent   PCP Verified by CM No   Prior Functional Level Independent in ADLs/IADLs   Current Functional Level Independent in ADLs/IADLs   Can patient return to prior living arrangement Yes   Ability to make needs known: Good   Financial Resources Medicaid   Social/Functional History   Type of Home Apartment   ADL Assistance Independent   Homemaking Assistance Independent   Ambulation Assistance Independent   Transfer Assistance Independent   Discharge Planning   Current Services Prior To Admission None   Potential Assistance Needed N/A   DME Ordered? No   Potential Assistance Purchasing Medications No   Type of Home Care Services None   Patient expects to be discharged to: Apartment   Services At/After Discharge   Transition of Care Consult (CM Consult) N/A   Services At/After Discharge None   Mode of Transport at Discharge Other (see comment)  (in car with family)   Condition of Participation: Discharge Planning   The Plan for Transition of Care is related to the following treatment goals: home     Maria Vegas LMSW  Care Management [facility]  [phone]

## 2024-10-03 LAB
SARS-COV-2 RNA RESP QL NAA+PROBE: NOT DETECTED
SOURCE: NORMAL

## 2024-10-03 PROCEDURE — 6370000000 HC RX 637 (ALT 250 FOR IP): Performed by: OBSTETRICS & GYNECOLOGY

## 2024-10-03 PROCEDURE — 59025 FETAL NON-STRESS TEST: CPT

## 2024-10-03 PROCEDURE — 87635 SARS-COV-2 COVID-19 AMP PRB: CPT

## 2024-10-03 PROCEDURE — 1120000000 HC RM PRIVATE OB

## 2024-10-03 PROCEDURE — 99232 SBSQ HOSP IP/OBS MODERATE 35: CPT | Performed by: OBSTETRICS & GYNECOLOGY

## 2024-10-03 RX ADMIN — NIFEDIPINE 30 MG: 30 TABLET, FILM COATED, EXTENDED RELEASE ORAL at 16:30

## 2024-10-03 RX ADMIN — Medication 1 TABLET: at 08:52

## 2024-10-03 RX ADMIN — NIFEDIPINE 30 MG: 30 TABLET, FILM COATED, EXTENDED RELEASE ORAL at 08:52

## 2024-10-03 RX ADMIN — METRONIDAZOLE: 7.5 GEL VAGINAL at 21:14

## 2024-10-03 ASSESSMENT — PAIN SCALES - GENERAL: PAINLEVEL_OUTOF10: 0

## 2024-10-03 NOTE — PROGRESS NOTES
1935: Bedside and Verbal shift change report given to Sharon RN (oncoming nurse) by SOPHIE Alexander (offgoing nurse). Report included the following information Nurse Handoff Report, Index, Adult Overview, MAR, and Recent Results.      2039: pt placed on monitor for routine NST.

## 2024-10-03 NOTE — PROGRESS NOTES
Ante Partum Progress Note    Dylanstew MAYES Dayday  36w2d    Assessment and Plan: 36w2d   CHTN with super-imposed pre-eclampsia without severe features.  Bps up to 150/100s on admission. Urine p/c increased to 0.2 up from 0.1 which was her baseline.  Serology HTN labs were normal. Bps now normal to low mild range elevation.   - continue inpatient management until delivery  - MFM consulted 10/1; appreciate their assistance  - s/p BMZ x2  - bid NSTs  - repeat labs every 3 days, next due 10/4  - cont nifedipine 30mg XR bid, will not increase medication regimen to not mask progression to severe disease  - plan delivery at 37w0d, sooner as indicated    36 week gestation: normal EFW with MFM 2 weeks ago  - GBS neg  - prior trichomonas in pregnancy; test of cure negative on 7/17 and again on 10/1. Clue cells seen on wet prep 10/1, so will treat empirically for BV given delivery planned next week    Heart palpitations: likely physiologic of pregnancy.  order ECG this morning    URI symptoms 10/3: sore throat and dry cough  -covid swab 10/3 pending  -supportive care, hydration, throat lozenge, tylenol, etc      Orders/Charges: Medium and NST     S:  Pt without acute events overnight. She does report mild cold symptoms this morning - sore throat and dry cough. Denies HA, vision changes, swelling, RUQ pain or other clinical si/sx of preE. Normotensive overnight. Good FM, no LOF, VB or strong contractions.     Vitals:  Patient Vitals for the past 24 hrs:   BP Temp Temp src Pulse Resp SpO2   10/03/24 0444 123/66 -- -- 100 16 --   10/03/24 0020 131/77 97.8 °F (36.6 °C) Oral 87 16 99 %   10/02/24 1930 (!) 141/92 98 °F (36.7 °C) Oral (!) 103 16 98 %   10/02/24 1620 (!) 141/88 98.6 °F (37 °C) Oral 95 16 99 %   10/02/24 1212 (!) 144/93 -- -- 97 -- 99 %   10/02/24 0830 (!) 142/94 98.2 °F (36.8 °C) Oral (!) 127 16 99 %         Last 24hr Input/Output:  No intake or output data in the 24 hours ending 10/03/24 0818     Non stress test:

## 2024-10-03 NOTE — PROGRESS NOTES
Bedside and Verbal shift change report given to KAYCEE Bennett RN (oncoming nurse) by Melvi Soto RN (offgoing nurse). Report included the following information Nurse Handoff Report, Intake/Output, MAR, and Recent Results  @1440  notified  of pt's  current BP.

## 2024-10-04 LAB
ALBUMIN SERPL-MCNC: 2.7 G/DL (ref 3.5–5)
ALBUMIN/GLOB SERPL: 0.6 (ref 1.1–2.2)
ALP SERPL-CCNC: 173 U/L (ref 45–117)
ALT SERPL-CCNC: 52 U/L (ref 12–78)
ANION GAP SERPL CALC-SCNC: 6 MMOL/L (ref 2–12)
AST SERPL-CCNC: 31 U/L (ref 15–37)
BACTERIA SPEC CULT: NORMAL
BASOPHILS # BLD: 0.1 K/UL (ref 0–0.1)
BASOPHILS NFR BLD: 1 % (ref 0–1)
BILIRUB SERPL-MCNC: 0.2 MG/DL (ref 0.2–1)
BUN SERPL-MCNC: 5 MG/DL (ref 6–20)
BUN/CREAT SERPL: 8 (ref 12–20)
CALCIUM SERPL-MCNC: 9.3 MG/DL (ref 8.5–10.1)
CHLORIDE SERPL-SCNC: 109 MMOL/L (ref 97–108)
CO2 SERPL-SCNC: 24 MMOL/L (ref 21–32)
CREAT SERPL-MCNC: 0.59 MG/DL (ref 0.55–1.02)
DIFFERENTIAL METHOD BLD: ABNORMAL
EOSINOPHIL # BLD: 0 K/UL (ref 0–0.4)
EOSINOPHIL NFR BLD: 0 % (ref 0–7)
ERYTHROCYTE [DISTWIDTH] IN BLOOD BY AUTOMATED COUNT: 16.1 % (ref 11.5–14.5)
GLOBULIN SER CALC-MCNC: 4.3 G/DL (ref 2–4)
GLUCOSE SERPL-MCNC: 111 MG/DL (ref 65–100)
HCT VFR BLD AUTO: 36.7 % (ref 35–47)
HGB BLD-MCNC: 11.6 G/DL (ref 11.5–16)
IMM GRANULOCYTES # BLD AUTO: 0.3 K/UL (ref 0–0.04)
IMM GRANULOCYTES NFR BLD AUTO: 2 % (ref 0–0.5)
LDH SERPL L TO P-CCNC: 182 U/L (ref 81–246)
LYMPHOCYTES # BLD: 1.9 K/UL (ref 0.8–3.5)
LYMPHOCYTES NFR BLD: 11 % (ref 12–49)
MAGNESIUM SERPL-MCNC: 2 MG/DL (ref 1.6–2.4)
MCH RBC QN AUTO: 26.1 PG (ref 26–34)
MCHC RBC AUTO-ENTMCNC: 31.6 G/DL (ref 30–36.5)
MCV RBC AUTO: 82.5 FL (ref 80–99)
MONOCYTES # BLD: 1.6 K/UL (ref 0–1)
MONOCYTES NFR BLD: 10 % (ref 5–13)
NEUTS SEG # BLD: 12.6 K/UL (ref 1.8–8)
NEUTS SEG NFR BLD: 76 % (ref 32–75)
NRBC # BLD: 0 K/UL (ref 0–0.01)
NRBC BLD-RTO: 0 PER 100 WBC
PLATELET # BLD AUTO: 322 K/UL (ref 150–400)
PMV BLD AUTO: 9.9 FL (ref 8.9–12.9)
POTASSIUM SERPL-SCNC: 3.4 MMOL/L (ref 3.5–5.1)
PROT SERPL-MCNC: 7 G/DL (ref 6.4–8.2)
RBC # BLD AUTO: 4.45 M/UL (ref 3.8–5.2)
SERVICE CMNT-IMP: NORMAL
SODIUM SERPL-SCNC: 139 MMOL/L (ref 136–145)
WBC # BLD AUTO: 16.4 K/UL (ref 3.6–11)

## 2024-10-04 PROCEDURE — 83615 LACTATE (LD) (LDH) ENZYME: CPT

## 2024-10-04 PROCEDURE — 85025 COMPLETE CBC W/AUTO DIFF WBC: CPT

## 2024-10-04 PROCEDURE — 83735 ASSAY OF MAGNESIUM: CPT

## 2024-10-04 PROCEDURE — 80053 COMPREHEN METABOLIC PANEL: CPT

## 2024-10-04 PROCEDURE — 6370000000 HC RX 637 (ALT 250 FOR IP): Performed by: OBSTETRICS & GYNECOLOGY

## 2024-10-04 PROCEDURE — 36415 COLL VENOUS BLD VENIPUNCTURE: CPT

## 2024-10-04 PROCEDURE — 59025 FETAL NON-STRESS TEST: CPT

## 2024-10-04 PROCEDURE — 99232 SBSQ HOSP IP/OBS MODERATE 35: CPT | Performed by: OBSTETRICS & GYNECOLOGY

## 2024-10-04 PROCEDURE — 1120000000 HC RM PRIVATE OB

## 2024-10-04 PROCEDURE — 59025 FETAL NON-STRESS TEST: CPT | Performed by: OBSTETRICS & GYNECOLOGY

## 2024-10-04 RX ORDER — PANTOPRAZOLE SODIUM 40 MG/1
40 TABLET, DELAYED RELEASE ORAL
Status: DISCONTINUED | OUTPATIENT
Start: 2024-10-04 | End: 2024-10-08

## 2024-10-04 RX ADMIN — PANTOPRAZOLE SODIUM 40 MG: 40 TABLET, DELAYED RELEASE ORAL at 09:18

## 2024-10-04 RX ADMIN — NIFEDIPINE 30 MG: 30 TABLET, FILM COATED, EXTENDED RELEASE ORAL at 09:18

## 2024-10-04 RX ADMIN — METRONIDAZOLE: 7.5 GEL VAGINAL at 22:17

## 2024-10-04 RX ADMIN — NIFEDIPINE 30 MG: 30 TABLET, FILM COATED, EXTENDED RELEASE ORAL at 16:23

## 2024-10-04 RX ADMIN — Medication 1 TABLET: at 09:18

## 2024-10-04 NOTE — PROGRESS NOTES
Ante Partum Progress Note    Dylanstew MAYES Dayday  36w3d    Assessment and Plan: 36w3d   CHTN with super-imposed pre-eclampsia without severe features.  Bps up to 150/100s on admission. Urine p/c increased to 0.2 up from 0.1 which was her baseline.  Serology HTN labs were normal. Bps now normal to low mild range elevation.   - continue inpatient management until delivery  - MFM consulted 10/1; appreciate their assistance  - s/p BMZ x2 (completed on 10/2)  - bid NSTs  - repeat labs every 3 days, next due 10/7  - cont nifedipine 30mg XR bid, will not increase medication regimen to not mask progression to severe disease  - plan delivery at 37w0d, sooner as indicated    36 week gestation: normal EFW with MFM 2 weeks ago  - GBS neg (prelim)  - prior trichomonas in pregnancy; test of cure negative on 7/17 and again on 10/1. Clue cells seen on wet prep 10/1, so will treat empirically for BV given delivery planned next week, course of metrogel undergoing    Heart palpitations: likely physiologic of pregnancy.  ECG with sinus tachycardia    URI symptoms 10/3: sore throat and dry cough  -covid swab 10/3 neg  -supportive care, hydration, throat lozenge, tylenol, etc      Orders/Charges: Medium and NST     S:  Pt without acute events overnight. She relays good fetal movement. Denies LOF, VB or strong contractions. She denies a headache, vision changes or RUQ pain.     Vitals:  Patient Vitals for the past 24 hrs:   BP Temp Temp src Pulse Resp SpO2   10/04/24 0034 120/77 98.2 °F (36.8 °C) Oral 99 16 96 %   10/03/24 1925 (!) 136/91 98 °F (36.7 °C) Oral 100 18 97 %   10/03/24 1747 (!) 149/87 -- -- (!) 105 -- --   10/03/24 1628 (!) 153/90 98.3 °F (36.8 °C) Oral (!) 119 18 99 %   10/03/24 0845 (!) 141/83 97.7 °F (36.5 °C) -- (!) 102 18 98 %         Last 24hr Input/Output:    Intake/Output Summary (Last 24 hours) at 10/4/2024 0806  Last data filed at 10/3/2024 0845  Gross per 24 hour   Intake 400 ml   Output --   Net 400 ml        Non

## 2024-10-04 NOTE — PROGRESS NOTES
Bedside and Verbal shift change report given to Sari BARRIOS (oncoming nurse) by Kaela Chowdhury RN (offgoing nurse). Report included the following information Nurse Handoff Report, Intake/Output, MAR, and Recent Results.

## 2024-10-04 NOTE — PROGRESS NOTES
0800: Bedside and Verbal shift change report given to KASH (oncoming nurse) by SOPHIE CALVO (offgoing nurse). Report included the following information Nurse Handoff Report.

## 2024-10-05 PROCEDURE — 6370000000 HC RX 637 (ALT 250 FOR IP): Performed by: OBSTETRICS & GYNECOLOGY

## 2024-10-05 PROCEDURE — 99232 SBSQ HOSP IP/OBS MODERATE 35: CPT | Performed by: OBSTETRICS & GYNECOLOGY

## 2024-10-05 PROCEDURE — 59025 FETAL NON-STRESS TEST: CPT

## 2024-10-05 PROCEDURE — 94760 N-INVAS EAR/PLS OXIMETRY 1: CPT

## 2024-10-05 PROCEDURE — 1120000000 HC RM PRIVATE OB

## 2024-10-05 RX ADMIN — Medication 1 TABLET: at 09:42

## 2024-10-05 RX ADMIN — PANTOPRAZOLE SODIUM 40 MG: 40 TABLET, DELAYED RELEASE ORAL at 06:57

## 2024-10-05 RX ADMIN — NIFEDIPINE 30 MG: 30 TABLET, FILM COATED, EXTENDED RELEASE ORAL at 09:42

## 2024-10-05 RX ADMIN — METRONIDAZOLE: 7.5 GEL VAGINAL at 21:22

## 2024-10-05 RX ADMIN — NIFEDIPINE 30 MG: 30 TABLET, FILM COATED, EXTENDED RELEASE ORAL at 16:06

## 2024-10-05 NOTE — PROGRESS NOTES
Ante Partum Progress Note    Cole Naylor  36w4d    Assessment and Plan: 36w4d   CHTN with super-imposed pre-eclampsia without severe features.  Bps up to 150/100s on admission. Urine p/c increased to 0.2 up from 0.1 which was her baseline.  Serology HTN labs were normal. Bps now normal to low mild range elevation.   - continue inpatient management until delivery  - MFM consulted 10/1; appreciate their assistance  - s/p BMZ x2 (completed on 10/2)  - bid NSTs  - repeat labs every 3 days, next due 10/7  - cont nifedipine 30mg XR bid, will not increase medication regimen to not mask progression to severe disease  - plan delivery at 37w0d, sooner as indicated    36 week gestation: normal EFW with MFM 2 weeks ago  - GBS neg   - prior trichomonas in pregnancy; test of cure negative on 7/17 and again on 10/1. Clue cells seen on wet prep 10/1, so will treat empirically for BV given delivery planned next week, course of metrogel undergoing (end 10/7)    Heart palpitations: likely physiologic of pregnancy.  ECG with sinus tachycardia    URI symptoms 10/3: sore throat and dry cough  -covid swab 10/3 neg  -supportive care, hydration, throat lozenge, tylenol, etc      Orders/Charges: Medium and NST     S:  Pt without acute events overnight. She relays good fetal movement. Denies LOF, VB or strong contractions. She denies a headache, vision changes or RUQ pain. Feeling stressed by being in the hospital    Vitals:  Patient Vitals for the past 24 hrs:   BP Temp Temp src Pulse Resp SpO2   10/05/24 0409 (!) 142/99 -- -- -- -- --   10/05/24 0015 126/77 -- -- -- -- --   10/04/24 2003 (!) 157/98 98.1 °F (36.7 °C) Oral 87 16 98 %   10/04/24 1615 138/88 98.4 °F (36.9 °C) Oral (!) 102 16 98 %   10/04/24 1150 (!) 148/97 -- -- -- -- --   10/04/24 0945 (!) 152/95 98.1 °F (36.7 °C) Oral 100 18 98 %         Last 24hr Input/Output:    Intake/Output Summary (Last 24 hours) at 10/5/2024 0922  Last data filed at 10/4/2024 1717  Gross per 24

## 2024-10-05 NOTE — PROGRESS NOTES
0800: Bedside and Verbal shift change report given to KASH (oncoming nurse) by TIFFANY (offgoing nurse). Report included the following information Nurse Handoff Report.

## 2024-10-06 PROCEDURE — APPNB30 APP NON BILLABLE TIME 0-30 MINS: Performed by: ADVANCED PRACTICE MIDWIFE

## 2024-10-06 PROCEDURE — 59025 FETAL NON-STRESS TEST: CPT

## 2024-10-06 PROCEDURE — 6370000000 HC RX 637 (ALT 250 FOR IP): Performed by: OBSTETRICS & GYNECOLOGY

## 2024-10-06 PROCEDURE — 1120000000 HC RM PRIVATE OB

## 2024-10-06 RX ADMIN — Medication 1 TABLET: at 08:18

## 2024-10-06 RX ADMIN — NIFEDIPINE 30 MG: 30 TABLET, FILM COATED, EXTENDED RELEASE ORAL at 08:18

## 2024-10-06 RX ADMIN — PANTOPRAZOLE SODIUM 40 MG: 40 TABLET, DELAYED RELEASE ORAL at 07:05

## 2024-10-06 RX ADMIN — NIFEDIPINE 30 MG: 30 TABLET, FILM COATED, EXTENDED RELEASE ORAL at 16:13

## 2024-10-06 RX ADMIN — METRONIDAZOLE: 7.5 GEL VAGINAL at 20:33

## 2024-10-06 NOTE — PROGRESS NOTES
Bedside shift change report given to Lisette Dasilva RN (oncoming nurse) by Kaela Chowdhury RN (offgoing nurse). Report included the following information Nurse Handoff Report, Intake/Output, MAR, and Recent Results.

## 2024-10-06 NOTE — PROGRESS NOTES
.Bedside and Verbal shift change report given to KAYCEE George RN (oncoming nurse) by ADRI Dasilva RN (offgoing nurse). Report included the following information Nurse Handoff Report, Index, Adult Overview, Intake/Output, MAR, and Recent Results.

## 2024-10-06 NOTE — PROGRESS NOTES
Signed       Expand All Collapse All    Ante Partum Progress Note     Cole Naylor  36w5d     Assessment and Plan: 36w5d   CHTN with super-imposed pre-eclampsia without severe features.  Bps up to 150/100s on admission. Urine p/c increased to 0.2 up from 0.1 which was her baseline.  Serology HTN labs were normal. Bps now normal to low mild range elevation.   - continue inpatient management until delivery  - MFM consulted 10/1; appreciate their assistance  - s/p BMZ x2 (completed on 10/2)  - bid NSTs  - repeat labs every 3 days, next due 10/7  - cont nifedipine 30mg XR bid, will not increase medication regimen to not mask progression to severe disease  - plan delivery at 37w0d, sooner as indicated     36 week gestation: normal EFW with MFM 2 weeks ago  - GBS neg   - prior trichomonas in pregnancy; test of cure negative on 7/17 and again on 10/1. Clue cells seen on wet prep 10/1, so will treat empirically for BV given delivery planned next week, course of metrogel undergoing (end 10/7)     Heart palpitations: likely physiologic of pregnancy.  ECG with sinus tachycardia     URI symptoms 10/3: sore throat and dry cough  -covid swab 10/3 neg  -supportive care, hydration, throat lozenge, tylenol, etc        Orders/Charges: Medium and NST      S:  Pt without acute events overnight. She relays good fetal movement. Denies LOF, VB or strong contractions. She denies a headache, vision changes or RUQ pain. Feeling stressed by being in the hospital but understands the need for increased surviellance. Pt would like to have her hair done prior to birth,  her mother is coming in tomorrow to help her with this.                    Vitals:  Patient Vitals for the past 24 hrs:   BP Temp Temp src Pulse Resp SpO2   10/06/24 0814 (!) 149/95 98.4 °F (36.9 °C) Oral 98 18 100 %   10/06/24 0424 (!) 148/95 -- -- 96 -- --   10/06/24 0027 131/81 97.5 °F (36.4 °C) Oral 69 16 98 %   10/05/24 2052 (!) 140/94 -- -- 97 -- --   10/05/24  1610 (!) 144/97 98 °F (36.7 °C) Oral 93 18 100 %   10/05/24 1250 (!) 141/99 -- -- 87 -- --     Temp (24hrs), Av °F (36.7 °C), Min:97.5 °F (36.4 °C), Max:98.4 °F (36.9 °C)    I&O:   No intake/output data recorded.             No intake/output data recorded.    Exam:  Patient without distress.               Abdomen soft, non-tender               Fundus soft and non tender               Right upper quadrant non-tender               Perineum No sign of blood or amniotic fluid               Lower extremities edema No               Patellar Reflexes: 2+ bilaterally     Uterine Activity: None               NST:  Reactive           Labs: No results found for this or any previous visit (from the past 24 hour(s)).    Assessment and Plan:    Patient Active Problem List    Diagnosis Date Noted    Chronic hypertension with superimposed preeclampsia 10/01/2024    Elevated blood pressure affecting pregnancy in third trimester, antepartum 2024    HTN (hypertension), benign 2024    Pregnancy 05/10/2024    Chronic headaches 2012

## 2024-10-07 PROBLEM — Z3A.36 36 WEEKS GESTATION OF PREGNANCY: Status: ACTIVE | Noted: 2024-10-07

## 2024-10-07 LAB
ABO + RH BLD: NORMAL
ALBUMIN SERPL-MCNC: 2.9 G/DL (ref 3.5–5)
ALBUMIN/GLOB SERPL: 0.7 (ref 1.1–2.2)
ALP SERPL-CCNC: 194 U/L (ref 45–117)
ALT SERPL-CCNC: 128 U/L (ref 12–78)
ANION GAP SERPL CALC-SCNC: 4 MMOL/L (ref 2–12)
AST SERPL-CCNC: 98 U/L (ref 15–37)
BASOPHILS # BLD: 0 K/UL (ref 0–0.1)
BASOPHILS NFR BLD: 0 % (ref 0–1)
BILIRUB SERPL-MCNC: 0.5 MG/DL (ref 0.2–1)
BLOOD GROUP ANTIBODIES SERPL: NORMAL
BUN SERPL-MCNC: 5 MG/DL (ref 6–20)
BUN/CREAT SERPL: 10 (ref 12–20)
CALCIUM SERPL-MCNC: 9.5 MG/DL (ref 8.5–10.1)
CHLORIDE SERPL-SCNC: 107 MMOL/L (ref 97–108)
CO2 SERPL-SCNC: 25 MMOL/L (ref 21–32)
CREAT SERPL-MCNC: 0.52 MG/DL (ref 0.55–1.02)
DIFFERENTIAL METHOD BLD: ABNORMAL
EOSINOPHIL # BLD: 0 K/UL (ref 0–0.4)
EOSINOPHIL NFR BLD: 0 % (ref 0–7)
ERYTHROCYTE [DISTWIDTH] IN BLOOD BY AUTOMATED COUNT: 16 % (ref 11.5–14.5)
GLOBULIN SER CALC-MCNC: 4.3 G/DL (ref 2–4)
GLUCOSE SERPL-MCNC: 89 MG/DL (ref 65–100)
HCT VFR BLD AUTO: 39.3 % (ref 35–47)
HGB BLD-MCNC: 12.5 G/DL (ref 11.5–16)
IMM GRANULOCYTES # BLD AUTO: 0.2 K/UL (ref 0–0.04)
IMM GRANULOCYTES NFR BLD AUTO: 2 % (ref 0–0.5)
LDH SERPL L TO P-CCNC: 188 U/L (ref 81–246)
LYMPHOCYTES # BLD: 2 K/UL (ref 0.8–3.5)
LYMPHOCYTES NFR BLD: 15 % (ref 12–49)
MCH RBC QN AUTO: 25.7 PG (ref 26–34)
MCHC RBC AUTO-ENTMCNC: 31.8 G/DL (ref 30–36.5)
MCV RBC AUTO: 80.7 FL (ref 80–99)
MONOCYTES # BLD: 0.9 K/UL (ref 0–1)
MONOCYTES NFR BLD: 7 % (ref 5–13)
NEUTS SEG # BLD: 10.5 K/UL (ref 1.8–8)
NEUTS SEG NFR BLD: 76 % (ref 32–75)
NRBC # BLD: 0 K/UL (ref 0–0.01)
NRBC BLD-RTO: 0 PER 100 WBC
PLATELET # BLD AUTO: 344 K/UL (ref 150–400)
PMV BLD AUTO: 10.2 FL (ref 8.9–12.9)
POTASSIUM SERPL-SCNC: 3.6 MMOL/L (ref 3.5–5.1)
PROT SERPL-MCNC: 7.2 G/DL (ref 6.4–8.2)
RBC # BLD AUTO: 4.87 M/UL (ref 3.8–5.2)
SODIUM SERPL-SCNC: 136 MMOL/L (ref 136–145)
SPECIMEN EXP DATE BLD: NORMAL
WBC # BLD AUTO: 13.7 K/UL (ref 3.6–11)

## 2024-10-07 PROCEDURE — 6370000000 HC RX 637 (ALT 250 FOR IP): Performed by: OBSTETRICS & GYNECOLOGY

## 2024-10-07 PROCEDURE — 99232 SBSQ HOSP IP/OBS MODERATE 35: CPT | Performed by: OBSTETRICS & GYNECOLOGY

## 2024-10-07 PROCEDURE — 59200 INSERT CERVICAL DILATOR: CPT

## 2024-10-07 PROCEDURE — 86900 BLOOD TYPING SEROLOGIC ABO: CPT

## 2024-10-07 PROCEDURE — 1100000000 HC RM PRIVATE

## 2024-10-07 PROCEDURE — 4A1HXCZ MONITORING OF PRODUCTS OF CONCEPTION, CARDIAC RATE, EXTERNAL APPROACH: ICD-10-PCS | Performed by: OBSTETRICS & GYNECOLOGY

## 2024-10-07 PROCEDURE — 59025 FETAL NON-STRESS TEST: CPT

## 2024-10-07 PROCEDURE — 2580000003 HC RX 258

## 2024-10-07 PROCEDURE — 6370000000 HC RX 637 (ALT 250 FOR IP)

## 2024-10-07 PROCEDURE — 59025 FETAL NON-STRESS TEST: CPT | Performed by: OBSTETRICS & GYNECOLOGY

## 2024-10-07 PROCEDURE — 36415 COLL VENOUS BLD VENIPUNCTURE: CPT

## 2024-10-07 PROCEDURE — 7210000100 HC LABOR FEE PER 1 HR

## 2024-10-07 PROCEDURE — 3E0DXGC INTRODUCTION OF OTHER THERAPEUTIC SUBSTANCE INTO MOUTH AND PHARYNX, EXTERNAL APPROACH: ICD-10-PCS | Performed by: OBSTETRICS & GYNECOLOGY

## 2024-10-07 PROCEDURE — 83615 LACTATE (LD) (LDH) ENZYME: CPT

## 2024-10-07 PROCEDURE — 86901 BLOOD TYPING SEROLOGIC RH(D): CPT

## 2024-10-07 PROCEDURE — 85025 COMPLETE CBC W/AUTO DIFF WBC: CPT

## 2024-10-07 PROCEDURE — 80053 COMPREHEN METABOLIC PANEL: CPT

## 2024-10-07 PROCEDURE — 86850 RBC ANTIBODY SCREEN: CPT

## 2024-10-07 RX ORDER — SEVOFLURANE 250 ML/250ML
1 LIQUID RESPIRATORY (INHALATION) CONTINUOUS PRN
Status: DISCONTINUED | OUTPATIENT
Start: 2024-10-07 | End: 2024-10-08

## 2024-10-07 RX ORDER — DIPHENHYDRAMINE HCL 25 MG
25 CAPSULE ORAL EVERY 4 HOURS PRN
Status: DISCONTINUED | OUTPATIENT
Start: 2024-10-07 | End: 2024-10-08 | Stop reason: SDUPTHER

## 2024-10-07 RX ORDER — ONDANSETRON 4 MG/1
4 TABLET, ORALLY DISINTEGRATING ORAL EVERY 6 HOURS PRN
Status: DISCONTINUED | OUTPATIENT
Start: 2024-10-07 | End: 2024-10-08 | Stop reason: SDUPTHER

## 2024-10-07 RX ORDER — ONDANSETRON 2 MG/ML
4 INJECTION INTRAMUSCULAR; INTRAVENOUS EVERY 6 HOURS PRN
Status: DISCONTINUED | OUTPATIENT
Start: 2024-10-07 | End: 2024-10-08 | Stop reason: SDUPTHER

## 2024-10-07 RX ORDER — CARBOPROST TROMETHAMINE 250 UG/ML
250 INJECTION, SOLUTION INTRAMUSCULAR PRN
Status: DISCONTINUED | OUTPATIENT
Start: 2024-10-07 | End: 2024-10-08

## 2024-10-07 RX ORDER — FENTANYL CITRATE 50 UG/ML
100 INJECTION, SOLUTION INTRAMUSCULAR; INTRAVENOUS
Status: DISCONTINUED | OUTPATIENT
Start: 2024-10-07 | End: 2024-10-08

## 2024-10-07 RX ORDER — SODIUM CHLORIDE, SODIUM LACTATE, POTASSIUM CHLORIDE, AND CALCIUM CHLORIDE .6; .31; .03; .02 G/100ML; G/100ML; G/100ML; G/100ML
1000 INJECTION, SOLUTION INTRAVENOUS PRN
Status: DISCONTINUED | OUTPATIENT
Start: 2024-10-07 | End: 2024-10-08

## 2024-10-07 RX ORDER — MISOPROSTOL 200 UG/1
400 TABLET ORAL PRN
Status: DISCONTINUED | OUTPATIENT
Start: 2024-10-07 | End: 2024-10-08

## 2024-10-07 RX ORDER — SODIUM CHLORIDE 9 MG/ML
INJECTION, SOLUTION INTRAVENOUS PRN
Status: DISCONTINUED | OUTPATIENT
Start: 2024-10-07 | End: 2024-10-08

## 2024-10-07 RX ORDER — SODIUM CHLORIDE 0.9 % (FLUSH) 0.9 %
5-40 SYRINGE (ML) INJECTION PRN
Status: DISCONTINUED | OUTPATIENT
Start: 2024-10-07 | End: 2024-10-08

## 2024-10-07 RX ORDER — TERBUTALINE SULFATE 1 MG/ML
0.25 INJECTION, SOLUTION SUBCUTANEOUS
Status: DISCONTINUED | OUTPATIENT
Start: 2024-10-07 | End: 2024-10-08

## 2024-10-07 RX ORDER — LIDOCAINE HYDROCHLORIDE 10 MG/ML
30 INJECTION, SOLUTION EPIDURAL; INFILTRATION; INTRACAUDAL; PERINEURAL PRN
Status: DISCONTINUED | OUTPATIENT
Start: 2024-10-07 | End: 2024-10-08

## 2024-10-07 RX ORDER — DIPHENHYDRAMINE HCL 25 MG
25 CAPSULE ORAL EVERY 4 HOURS PRN
Status: DISCONTINUED | OUTPATIENT
Start: 2024-10-07 | End: 2024-10-08

## 2024-10-07 RX ORDER — SODIUM CHLORIDE 0.9 % (FLUSH) 0.9 %
5-40 SYRINGE (ML) INJECTION EVERY 12 HOURS SCHEDULED
Status: DISCONTINUED | OUTPATIENT
Start: 2024-10-07 | End: 2024-10-08

## 2024-10-07 RX ORDER — SODIUM CHLORIDE, SODIUM LACTATE, POTASSIUM CHLORIDE, AND CALCIUM CHLORIDE .6; .31; .03; .02 G/100ML; G/100ML; G/100ML; G/100ML
500 INJECTION, SOLUTION INTRAVENOUS PRN
Status: DISCONTINUED | OUTPATIENT
Start: 2024-10-07 | End: 2024-10-08

## 2024-10-07 RX ORDER — METHYLERGONOVINE MALEATE 0.2 MG/ML
200 INJECTION INTRAVENOUS PRN
Status: DISCONTINUED | OUTPATIENT
Start: 2024-10-07 | End: 2024-10-08

## 2024-10-07 RX ORDER — SODIUM CHLORIDE, SODIUM LACTATE, POTASSIUM CHLORIDE, CALCIUM CHLORIDE 600; 310; 30; 20 MG/100ML; MG/100ML; MG/100ML; MG/100ML
INJECTION, SOLUTION INTRAVENOUS CONTINUOUS
Status: DISCONTINUED | OUTPATIENT
Start: 2024-10-07 | End: 2024-10-08

## 2024-10-07 RX ADMIN — NIFEDIPINE 30 MG: 30 TABLET, FILM COATED, EXTENDED RELEASE ORAL at 08:48

## 2024-10-07 RX ADMIN — NIFEDIPINE 30 MG: 30 TABLET, FILM COATED, EXTENDED RELEASE ORAL at 17:00

## 2024-10-07 RX ADMIN — Medication 25 MCG: at 20:16

## 2024-10-07 RX ADMIN — SODIUM CHLORIDE, PRESERVATIVE FREE 10 ML: 5 INJECTION INTRAVENOUS at 20:15

## 2024-10-07 RX ADMIN — PANTOPRAZOLE SODIUM 40 MG: 40 TABLET, DELAYED RELEASE ORAL at 08:48

## 2024-10-07 RX ADMIN — Medication 1 TABLET: at 08:48

## 2024-10-07 NOTE — PROGRESS NOTES
1720 Pt transferred to L&D 7 via w/c from 3E for IOL due to Pre-E. TRANSFER - IN REPORT:    Verbal report received from SIXTO George RN on Cole Naylor  being received from 3E for routine progression of patient care      Report consisted of patient's Situation, Background, Assessment and   Recommendations(SBAR).     Information from the following report(s) Nurse Handoff Report, MAR, and Recent Results was reviewed with the receiving nurse.    Opportunity for questions and clarification was provided.      Assessment completed upon patient's arrival to unit and care assumed.      1855 ALDO San CNM at bedside, SVE closed/thick (external os opening). Plan for cytotec.     1944 Bedside report given to ARTHUR Mooney RN.

## 2024-10-07 NOTE — H&P
History & Physical    Name: Cole Naylor MRN: [id number]  SSN: [id number]    YOB: 2000  Age: 24 y.o.  Sex: female        Subjective:     Estimated Date of Delivery: 10/29/24  OB History          2    Para   1    Term           1    AB        Living   1         SAB        IAB        Ectopic        Molar        Multiple        Live Births   1                Ms. Naylor is admitted with pregnancy at 36w6d for IOL. Prenatal course was complicated by chronic hypertension with superimposed preeclampsia, marijuana use. Please see prenatal records for details.    Patient Active Problem List    Diagnosis Date Noted    Chronic hypertension with superimposed preeclampsia 10/01/2024    Elevated blood pressure affecting pregnancy in third trimester, antepartum 2024    HTN (hypertension), benign 2024    Pregnancy 05/10/2024     Primary Provider: Yasmin [id number], PTSVD x1 at 35 weeks (5lb 6oz)  EDC by D=15 wk US    Pregnancy problems:  CHTN: not followed by a PCP outside of pregnancy.  Not consistently complaint with medication this pregnancy.  Developed SI preE in G1 at 35 weeks which warranted delivery.  Ld aspirin sent. Baseline labs WNL and baseline urine p/c 0.1. /100 at first visit.  Rx nifedipine 30XR sent 5/10. Not taking until , 9/3 rx for bid sent.   - following with MFM for serial growth and weekly  surveillance  -  24 urine protein: 287mg  -  MFM EFW at 33 weeks; 44%ile, PRISCILA 16, cephalic  Marijuana use: discussed cessation  Trichomonas pos: dx at first visit. Sent flagyl  -->  appt pt still taking the medication. ROBERT  neg. Recheck 36 weeks___      IOB labs: O Pos, normal, VZV NI --> rec PP vacc. Urine cult neg, trich pos, GC/Chl neg  Genetic Screening: NIPT low risk BOY!!, declines carrier screening  Anatomy: BOY!! Normal anatomy, posterior placenta  Flu:  TDAP: declines  Third Tri Labs: refused glucola advised accuchecks at home but pt

## 2024-10-07 NOTE — PROGRESS NOTES
Signed       Expand All Collapse All    Ante Partum Progress Note     Cole Naylor  36w6d     Assessment and Plan: 36w6d   CHTN with super-imposed pre-eclampsia without severe features.  Bps up to 150/100s on admission. Urine p/c increased to 0.2 up from 0.1 which was her baseline.  Serology HTN labs were normal on admission. Bps now normal to low mild range elevation.   - continue inpatient management until delivery  - MFM consulted 10/1; appreciate their assistance  - s/p BMZ x2 (completed on 10/2)  - bid NSTs  - cont nifedipine 30mg XR bid, will not increase medication regimen to not mask progression to severe disease  - LFTs increased today and now about 2x upper limit of normal. Case reviewed with MFM on call today Dr. Cifunetes and she agrees with proceeding with the IOL initiation today; plan reviewed with L&D and APS nursing teams, currently no open rooms available on L&D but she will be transferred once there is an open room     36 week gestation: normal EFW with MFM 2 weeks ago  - GBS neg   - prior trichomonas in pregnancy; test of cure negative on 7/17 and again on 10/1. Clue cells seen on wet prep 10/1, so will treat empirically for BV given delivery planned next week, course of metrogel undergoing (end 10/7)     Heart palpitations: likely physiologic of pregnancy.  ECG with sinus tachycardia     URI symptoms 10/3: sore throat and dry cough  -covid swab 10/3 neg  -supportive care, hydration, throat lozenge, tylenol, etc        Orders/Charges: Medium and NST      S:  Pt did well overnight.  Denies LOF, VB or strong contractions. She denies a headache, vision changes or RUQ pain. Feeling active fetal movements.  She is anxious to go ahead and proceed with delivery.                    Vitals:  Patient Vitals for the past 24 hrs:   BP Temp Temp src Pulse Resp SpO2   10/07/24 0752 (!) 143/93 97.7 °F (36.5 °C) Oral 95 15 100 %   10/07/24 0400 (!) 138/91 -- -- 90 16 --   10/07/24 0000 (!) 140/95 98.4    Result Value Ref Range    Sodium 136 136 - 145 mmol/L    Potassium 3.6 3.5 - 5.1 mmol/L    Chloride 107 97 - 108 mmol/L    CO2 25 21 - 32 mmol/L    Anion Gap 4 2 - 12 mmol/L    Glucose 89 65 - 100 mg/dL    BUN 5 (L) 6 - 20 MG/DL    Creatinine 0.52 (L) 0.55 - 1.02 MG/DL    BUN/Creatinine Ratio 10 (L) 12 - 20      Est, Glom Filt Rate >90 >60 ml/min/1.73m2    Calcium 9.5 8.5 - 10.1 MG/DL    Total Bilirubin 0.5 0.2 - 1.0 MG/DL     (H) 12 - 78 U/L    AST 98 (H) 15 - 37 U/L    Alk Phosphatase 194 (H) 45 - 117 U/L    Total Protein 7.2 6.4 - 8.2 g/dL    Albumin 2.9 (L) 3.5 - 5.0 g/dL    Globulin 4.3 (H) 2.0 - 4.0 g/dL    Albumin/Globulin Ratio 0.7 (L) 1.1 - 2.2     Lactate Dehydrogenase    Collection Time: 10/07/24  6:04 AM   Result Value Ref Range     81 - 246 U/L       Assessment and Plan:    Patient Active Problem List    Diagnosis Date Noted    Chronic hypertension with superimposed preeclampsia 10/01/2024    Elevated blood pressure affecting pregnancy in third trimester, antepartum 09/13/2024    HTN (hypertension), benign 08/09/2024    Pregnancy 05/10/2024    Chronic headaches 04/30/2012

## 2024-10-07 NOTE — PROGRESS NOTES
Bedside and Verbal shift change report given to Shadi BARRIOS  (oncoming nurse) by Benjamin BARRIOS (offgoing nurse). Report included the following information Nurse Handoff Report, Index, Adult Overview, Surgery Report, Intake/Output, MAR, Recent Results, and Med Rec Status.

## 2024-10-07 NOTE — PROGRESS NOTES
Bedside and Verbal shift change report given to KAYCEE George RN (oncoming nurse) by YAMILE Perry RN (offgoing nurse). Report included the following information Nurse Handoff Report, Index, Adult Overview, Intake/Output, MAR, and Recent Results.      1730- TRANSFER - OUT REPORT:    Verbal report given to TOMMIE Myers RN on Cole Naylor  being transferred to L&D for routine progression of patient care       Report consisted of patient's Situation, Background, Assessment and   Recommendations(SBAR).     Information from the following report(s) Nurse Handoff Report, Index, Adult Overview, Intake/Output, MAR, and Recent Results was reviewed with the receiving nurse.           Lines:       Opportunity for questions and clarification was provided.      Patient transported with:  Registered Nurse

## 2024-10-07 NOTE — PROGRESS NOTES
No ETA on when pt can be transferred to L&D for initiation of IOL due to still no bed availability on L&D. Plan starting with cytotec with possible cook catheter pending cervical exam (closed last week).  Her Bps have been mild range and NST this morning was reactive.   Recheck LFTs in the morning given significant bump today.     Monica Hoffman MD

## 2024-10-08 ENCOUNTER — ANESTHESIA EVENT (OUTPATIENT)
Facility: HOSPITAL | Age: 24
End: 2024-10-08
Payer: MEDICAID

## 2024-10-08 ENCOUNTER — ANESTHESIA (OUTPATIENT)
Facility: HOSPITAL | Age: 24
End: 2024-10-08
Payer: MEDICAID

## 2024-10-08 PROBLEM — Z79.899 MEDICATION MANAGEMENT: Status: ACTIVE | Noted: 2024-10-08

## 2024-10-08 LAB
ALBUMIN SERPL-MCNC: 3 G/DL (ref 3.5–5)
ALBUMIN/GLOB SERPL: 0.7 (ref 1.1–2.2)
ALP SERPL-CCNC: 229 U/L (ref 45–117)
ALT SERPL-CCNC: 222 U/L (ref 12–78)
ANION GAP SERPL CALC-SCNC: 7 MMOL/L (ref 2–12)
AST SERPL-CCNC: 154 U/L (ref 15–37)
BILIRUB SERPL-MCNC: 0.6 MG/DL (ref 0.2–1)
BUN SERPL-MCNC: 6 MG/DL (ref 6–20)
BUN/CREAT SERPL: 12 (ref 12–20)
CALCIUM SERPL-MCNC: 10 MG/DL (ref 8.5–10.1)
CHLORIDE SERPL-SCNC: 104 MMOL/L (ref 97–108)
CO2 SERPL-SCNC: 24 MMOL/L (ref 21–32)
CREAT SERPL-MCNC: 0.51 MG/DL (ref 0.55–1.02)
ERYTHROCYTE [DISTWIDTH] IN BLOOD BY AUTOMATED COUNT: 16 % (ref 11.5–14.5)
GLOBULIN SER CALC-MCNC: 4.2 G/DL (ref 2–4)
GLUCOSE SERPL-MCNC: 88 MG/DL (ref 65–100)
HCT VFR BLD AUTO: 39.4 % (ref 35–47)
HGB BLD-MCNC: 12.5 G/DL (ref 11.5–16)
MCH RBC QN AUTO: 25.8 PG (ref 26–34)
MCHC RBC AUTO-ENTMCNC: 31.7 G/DL (ref 30–36.5)
MCV RBC AUTO: 81.4 FL (ref 80–99)
NRBC # BLD: 0 K/UL (ref 0–0.01)
NRBC BLD-RTO: 0 PER 100 WBC
PLATELET # BLD AUTO: 316 K/UL (ref 150–400)
PMV BLD AUTO: 10.1 FL (ref 8.9–12.9)
POTASSIUM SERPL-SCNC: 3.6 MMOL/L (ref 3.5–5.1)
PROT SERPL-MCNC: 7.2 G/DL (ref 6.4–8.2)
RBC # BLD AUTO: 4.84 M/UL (ref 3.8–5.2)
SODIUM SERPL-SCNC: 135 MMOL/L (ref 136–145)
WBC # BLD AUTO: 15.4 K/UL (ref 3.6–11)

## 2024-10-08 PROCEDURE — 51701 INSERT BLADDER CATHETER: CPT

## 2024-10-08 PROCEDURE — 7100000001 HC PACU RECOVERY - ADDTL 15 MIN

## 2024-10-08 PROCEDURE — 7220000101 HC DELIVERY VAGINAL/SINGLE

## 2024-10-08 PROCEDURE — 3700000025 EPIDURAL BLOCK: Performed by: ANESTHESIOLOGY

## 2024-10-08 PROCEDURE — 80053 COMPREHEN METABOLIC PANEL: CPT

## 2024-10-08 PROCEDURE — C1726 CATH, BAL DIL, NON-VASCULAR: HCPCS

## 2024-10-08 PROCEDURE — APPNB30 APP NON BILLABLE TIME 0-30 MINS

## 2024-10-08 PROCEDURE — 2580000003 HC RX 258: Performed by: OBSTETRICS & GYNECOLOGY

## 2024-10-08 PROCEDURE — 6360000002 HC RX W HCPCS

## 2024-10-08 PROCEDURE — 1120000000 HC RM PRIVATE OB

## 2024-10-08 PROCEDURE — 6370000000 HC RX 637 (ALT 250 FOR IP): Performed by: OBSTETRICS & GYNECOLOGY

## 2024-10-08 PROCEDURE — 6370000000 HC RX 637 (ALT 250 FOR IP)

## 2024-10-08 PROCEDURE — 0UQMXZZ REPAIR VULVA, EXTERNAL APPROACH: ICD-10-PCS | Performed by: OBSTETRICS & GYNECOLOGY

## 2024-10-08 PROCEDURE — 2580000003 HC RX 258

## 2024-10-08 PROCEDURE — APPNB15 APP NON BILLABLE TIME 0-15 MINS

## 2024-10-08 PROCEDURE — 85027 COMPLETE CBC AUTOMATED: CPT

## 2024-10-08 PROCEDURE — 6360000002 HC RX W HCPCS: Performed by: ANESTHESIOLOGY

## 2024-10-08 PROCEDURE — 59400 OBSTETRICAL CARE: CPT | Performed by: OBSTETRICS & GYNECOLOGY

## 2024-10-08 PROCEDURE — 7210000100 HC LABOR FEE PER 1 HR

## 2024-10-08 PROCEDURE — 6360000002 HC RX W HCPCS: Performed by: OBSTETRICS & GYNECOLOGY

## 2024-10-08 PROCEDURE — 59200 INSERT CERVICAL DILATOR: CPT

## 2024-10-08 PROCEDURE — 2500000003 HC RX 250 WO HCPCS

## 2024-10-08 PROCEDURE — 3700000156 HC EPIDURAL ANESTHESIA

## 2024-10-08 PROCEDURE — 36415 COLL VENOUS BLD VENIPUNCTURE: CPT

## 2024-10-08 PROCEDURE — 7100000000 HC PACU RECOVERY - FIRST 15 MIN

## 2024-10-08 RX ORDER — FENTANYL/BUPIVACAINE/NS/PF 2-1250MCG
1-15 PLASTIC BAG, INJECTION (ML) INJECTION CONTINUOUS
Status: DISCONTINUED | OUTPATIENT
Start: 2024-10-08 | End: 2024-10-08

## 2024-10-08 RX ORDER — HYDRALAZINE HYDROCHLORIDE 20 MG/ML
10 INJECTION INTRAMUSCULAR; INTRAVENOUS
Status: COMPLETED | OUTPATIENT
Start: 2024-10-08 | End: 2024-10-08

## 2024-10-08 RX ORDER — SODIUM CHLORIDE 0.9 % (FLUSH) 0.9 %
5-40 SYRINGE (ML) INJECTION EVERY 12 HOURS SCHEDULED
Status: DISCONTINUED | OUTPATIENT
Start: 2024-10-08 | End: 2024-10-11 | Stop reason: HOSPADM

## 2024-10-08 RX ORDER — FENTANYL CITRATE 50 UG/ML
INJECTION, SOLUTION INTRAMUSCULAR; INTRAVENOUS
Status: DISCONTINUED
Start: 2024-10-08 | End: 2024-10-08

## 2024-10-08 RX ORDER — ONDANSETRON 2 MG/ML
4 INJECTION INTRAMUSCULAR; INTRAVENOUS EVERY 6 HOURS PRN
Status: DISCONTINUED | OUTPATIENT
Start: 2024-10-08 | End: 2024-10-11 | Stop reason: HOSPADM

## 2024-10-08 RX ORDER — NIFEDIPINE 30 MG/1
30 TABLET, EXTENDED RELEASE ORAL 2 TIMES DAILY
Status: DISCONTINUED | OUTPATIENT
Start: 2024-10-08 | End: 2024-10-11 | Stop reason: HOSPADM

## 2024-10-08 RX ORDER — EPHEDRINE SULFATE 50 MG/ML
5 INJECTION INTRAVENOUS PRN
Status: DISCONTINUED | OUTPATIENT
Start: 2024-10-09 | End: 2024-10-08

## 2024-10-08 RX ORDER — SODIUM CHLORIDE 0.9 % (FLUSH) 0.9 %
5-40 SYRINGE (ML) INJECTION PRN
Status: DISCONTINUED | OUTPATIENT
Start: 2024-10-08 | End: 2024-10-11 | Stop reason: HOSPADM

## 2024-10-08 RX ORDER — SODIUM CHLORIDE 0.9 % (FLUSH) 0.9 %
5-40 SYRINGE (ML) INJECTION EVERY 12 HOURS SCHEDULED
Status: DISCONTINUED | OUTPATIENT
Start: 2024-10-08 | End: 2024-10-08

## 2024-10-08 RX ORDER — NALOXONE HYDROCHLORIDE 0.4 MG/ML
INJECTION, SOLUTION INTRAMUSCULAR; INTRAVENOUS; SUBCUTANEOUS PRN
Status: DISCONTINUED | OUTPATIENT
Start: 2024-10-08 | End: 2024-10-08

## 2024-10-08 RX ORDER — SODIUM CHLORIDE, SODIUM LACTATE, POTASSIUM CHLORIDE, CALCIUM CHLORIDE 600; 310; 30; 20 MG/100ML; MG/100ML; MG/100ML; MG/100ML
INJECTION, SOLUTION INTRAVENOUS CONTINUOUS
Status: DISCONTINUED | OUTPATIENT
Start: 2024-10-08 | End: 2024-10-11 | Stop reason: HOSPADM

## 2024-10-08 RX ORDER — HYDRALAZINE HYDROCHLORIDE 20 MG/ML
INJECTION INTRAMUSCULAR; INTRAVENOUS
Status: COMPLETED
Start: 2024-10-08 | End: 2024-10-08

## 2024-10-08 RX ORDER — ACETAMINOPHEN 500 MG
1000 TABLET ORAL EVERY 8 HOURS SCHEDULED
Status: DISCONTINUED | OUTPATIENT
Start: 2024-10-08 | End: 2024-10-11 | Stop reason: HOSPADM

## 2024-10-08 RX ORDER — DIPHENHYDRAMINE HCL 25 MG
25 CAPSULE ORAL EVERY 4 HOURS PRN
Status: DISCONTINUED | OUTPATIENT
Start: 2024-10-08 | End: 2024-10-08

## 2024-10-08 RX ORDER — CALCIUM GLUCONATE 94 MG/ML
1000 INJECTION, SOLUTION INTRAVENOUS PRN
Status: DISCONTINUED | OUTPATIENT
Start: 2024-10-08 | End: 2024-10-08

## 2024-10-08 RX ORDER — SODIUM CHLORIDE 0.9 % (FLUSH) 0.9 %
5-40 SYRINGE (ML) INJECTION PRN
Status: DISCONTINUED | OUTPATIENT
Start: 2024-10-08 | End: 2024-10-08

## 2024-10-08 RX ORDER — LABETALOL HYDROCHLORIDE 5 MG/ML
40 INJECTION, SOLUTION INTRAVENOUS
Status: COMPLETED | OUTPATIENT
Start: 2024-10-08 | End: 2024-10-08

## 2024-10-08 RX ORDER — MAGNESIUM SULFATE IN WATER 40 MG/ML
INJECTION, SOLUTION INTRAVENOUS
Status: COMPLETED
Start: 2024-10-08 | End: 2024-10-08

## 2024-10-08 RX ORDER — FENTANYL/BUPIVACAINE/NS/PF 2-1250MCG
PLASTIC BAG, INJECTION (ML) INJECTION
Status: COMPLETED
Start: 2024-10-08 | End: 2024-10-08

## 2024-10-08 RX ORDER — MAGNESIUM SULFATE HEPTAHYDRATE 40 MG/ML
4000 INJECTION, SOLUTION INTRAVENOUS ONCE
Status: COMPLETED | OUTPATIENT
Start: 2024-10-08 | End: 2024-10-08

## 2024-10-08 RX ORDER — ONDANSETRON 2 MG/ML
4 INJECTION INTRAMUSCULAR; INTRAVENOUS EVERY 6 HOURS PRN
Status: DISCONTINUED | OUTPATIENT
Start: 2024-10-08 | End: 2024-10-08 | Stop reason: SDUPTHER

## 2024-10-08 RX ORDER — HYDROMORPHONE HYDROCHLORIDE 1 MG/ML
1 INJECTION, SOLUTION INTRAMUSCULAR; INTRAVENOUS; SUBCUTANEOUS EVERY 4 HOURS PRN
Status: DISCONTINUED | OUTPATIENT
Start: 2024-10-08 | End: 2024-10-08

## 2024-10-08 RX ORDER — MODIFIED LANOLIN
OINTMENT (GRAM) TOPICAL PRN
Status: DISCONTINUED | OUTPATIENT
Start: 2024-10-08 | End: 2024-10-11 | Stop reason: HOSPADM

## 2024-10-08 RX ORDER — SODIUM CHLORIDE 9 MG/ML
INJECTION, SOLUTION INTRAVENOUS PRN
Status: DISCONTINUED | OUTPATIENT
Start: 2024-10-08 | End: 2024-10-11 | Stop reason: HOSPADM

## 2024-10-08 RX ORDER — EPHEDRINE SULFATE 50 MG/ML
10 INJECTION INTRAVENOUS
Status: DISCONTINUED | OUTPATIENT
Start: 2024-10-08 | End: 2024-10-08

## 2024-10-08 RX ORDER — BUPIVACAINE HYDROCHLORIDE 2.5 MG/ML
INJECTION, SOLUTION EPIDURAL; INFILTRATION; INTRACAUDAL
Status: DISCONTINUED | OUTPATIENT
Start: 2024-10-08 | End: 2024-10-08 | Stop reason: SDUPTHER

## 2024-10-08 RX ORDER — EPHEDRINE SULFATE 50 MG/ML
INJECTION INTRAVENOUS
Status: DISCONTINUED
Start: 2024-10-08 | End: 2024-10-08 | Stop reason: WASHOUT

## 2024-10-08 RX ORDER — SODIUM CHLORIDE 9 MG/ML
INJECTION, SOLUTION INTRAVENOUS PRN
Status: DISCONTINUED | OUTPATIENT
Start: 2024-10-08 | End: 2024-10-08

## 2024-10-08 RX ORDER — FENTANYL CITRATE 50 UG/ML
INJECTION, SOLUTION INTRAMUSCULAR; INTRAVENOUS
Status: DISCONTINUED | OUTPATIENT
Start: 2024-10-08 | End: 2024-10-08 | Stop reason: SDUPTHER

## 2024-10-08 RX ORDER — ONDANSETRON 4 MG/1
4 TABLET, ORALLY DISINTEGRATING ORAL EVERY 6 HOURS PRN
Status: DISCONTINUED | OUTPATIENT
Start: 2024-10-08 | End: 2024-10-11 | Stop reason: HOSPADM

## 2024-10-08 RX ORDER — LABETALOL HYDROCHLORIDE 5 MG/ML
20 INJECTION, SOLUTION INTRAVENOUS
Status: COMPLETED | OUTPATIENT
Start: 2024-10-08 | End: 2024-10-08

## 2024-10-08 RX ORDER — BUPIVACAINE HYDROCHLORIDE 2.5 MG/ML
INJECTION, SOLUTION EPIDURAL; INFILTRATION; INTRACAUDAL
Status: COMPLETED
Start: 2024-10-08 | End: 2024-10-08

## 2024-10-08 RX ORDER — MISOPROSTOL 200 UG/1
800 TABLET ORAL ONCE
Status: DISCONTINUED | OUTPATIENT
Start: 2024-10-08 | End: 2024-10-11 | Stop reason: HOSPADM

## 2024-10-08 RX ORDER — HYDRALAZINE HYDROCHLORIDE 20 MG/ML
5 INJECTION INTRAMUSCULAR; INTRAVENOUS
Status: COMPLETED | OUTPATIENT
Start: 2024-10-08 | End: 2024-10-08

## 2024-10-08 RX ORDER — DOCUSATE SODIUM 100 MG/1
100 CAPSULE, LIQUID FILLED ORAL 2 TIMES DAILY
Status: DISCONTINUED | OUTPATIENT
Start: 2024-10-08 | End: 2024-10-11 | Stop reason: HOSPADM

## 2024-10-08 RX ORDER — IBUPROFEN 400 MG/1
800 TABLET, FILM COATED ORAL EVERY 8 HOURS SCHEDULED
Status: DISCONTINUED | OUTPATIENT
Start: 2024-10-08 | End: 2024-10-11 | Stop reason: HOSPADM

## 2024-10-08 RX ADMIN — Medication 10 ML/HR: at 14:40

## 2024-10-08 RX ADMIN — NIFEDIPINE 30 MG: 30 TABLET, FILM COATED, EXTENDED RELEASE ORAL at 06:05

## 2024-10-08 RX ADMIN — SODIUM CHLORIDE, POTASSIUM CHLORIDE, SODIUM LACTATE AND CALCIUM CHLORIDE: 600; 310; 30; 20 INJECTION, SOLUTION INTRAVENOUS at 20:17

## 2024-10-08 RX ADMIN — BUPIVACAINE HYDROCHLORIDE 2 ML: 2.5 INJECTION, SOLUTION EPIDURAL; INFILTRATION; INTRACAUDAL; PERINEURAL at 14:26

## 2024-10-08 RX ADMIN — LABETALOL HYDROCHLORIDE 40 MG: 5 INJECTION INTRAVENOUS at 13:04

## 2024-10-08 RX ADMIN — BUPIVACAINE HYDROCHLORIDE 2 ML: 2.5 INJECTION, SOLUTION EPIDURAL; INFILTRATION; INTRACAUDAL; PERINEURAL at 14:29

## 2024-10-08 RX ADMIN — NIFEDIPINE 30 MG: 30 TABLET, FILM COATED, EXTENDED RELEASE ORAL at 21:42

## 2024-10-08 RX ADMIN — DOCUSATE SODIUM 100 MG: 100 CAPSULE, LIQUID FILLED ORAL at 21:42

## 2024-10-08 RX ADMIN — MAGNESIUM SULFATE HEPTAHYDRATE 4000 MG: 40 INJECTION, SOLUTION INTRAVENOUS at 11:48

## 2024-10-08 RX ADMIN — HYDROMORPHONE HYDROCHLORIDE 1 MG: 1 INJECTION, SOLUTION INTRAMUSCULAR; INTRAVENOUS; SUBCUTANEOUS at 00:26

## 2024-10-08 RX ADMIN — HYDRALAZINE HYDROCHLORIDE 5 MG: 20 INJECTION INTRAMUSCULAR; INTRAVENOUS at 11:39

## 2024-10-08 RX ADMIN — HYDRALAZINE HYDROCHLORIDE 10 MG: 20 INJECTION INTRAMUSCULAR; INTRAVENOUS at 12:04

## 2024-10-08 RX ADMIN — ONDANSETRON 4 MG: 2 INJECTION INTRAMUSCULAR; INTRAVENOUS at 06:16

## 2024-10-08 RX ADMIN — NIFEDIPINE 30 MG: 30 TABLET, FILM COATED, EXTENDED RELEASE ORAL at 07:58

## 2024-10-08 RX ADMIN — MAGNESIUM SULFATE HEPTAHYDRATE 2000 MG/HR: 40 INJECTION, SOLUTION INTRAVENOUS at 12:10

## 2024-10-08 RX ADMIN — IBUPROFEN 800 MG: 400 TABLET, FILM COATED ORAL at 17:23

## 2024-10-08 RX ADMIN — Medication 166.7 ML: at 14:48

## 2024-10-08 RX ADMIN — Medication 25 MCG: at 04:04

## 2024-10-08 RX ADMIN — OXYTOCIN 2 MILLI-UNITS/MIN: 10 INJECTION, SOLUTION INTRAMUSCULAR; INTRAVENOUS at 09:16

## 2024-10-08 RX ADMIN — SODIUM CHLORIDE, POTASSIUM CHLORIDE, SODIUM LACTATE AND CALCIUM CHLORIDE: 600; 310; 30; 20 INJECTION, SOLUTION INTRAVENOUS at 09:12

## 2024-10-08 RX ADMIN — FENTANYL CITRATE 100 MCG: 50 INJECTION, SOLUTION INTRAMUSCULAR; INTRAVENOUS at 14:26

## 2024-10-08 RX ADMIN — LABETALOL HYDROCHLORIDE 20 MG: 5 INJECTION INTRAVENOUS at 12:37

## 2024-10-08 RX ADMIN — MAGNESIUM SULFATE HEPTAHYDRATE 2000 MG/HR: 40 INJECTION, SOLUTION INTRAVENOUS at 17:18

## 2024-10-08 RX ADMIN — MAGNESIUM SULFATE HEPTAHYDRATE 2000 MG/HR: 40 INJECTION, SOLUTION INTRAVENOUS at 23:01

## 2024-10-08 RX ADMIN — ACETAMINOPHEN 1000 MG: 500 TABLET ORAL at 21:42

## 2024-10-08 RX ADMIN — PANTOPRAZOLE SODIUM 40 MG: 40 TABLET, DELAYED RELEASE ORAL at 06:05

## 2024-10-08 RX ADMIN — BUPIVACAINE HYDROCHLORIDE 2 ML: 2.5 INJECTION, SOLUTION EPIDURAL; INFILTRATION; INTRACAUDAL; PERINEURAL at 14:33

## 2024-10-08 ASSESSMENT — PAIN SCALES - GENERAL: PAINLEVEL_OUTOF10: 8

## 2024-10-08 NOTE — ANESTHESIA PROCEDURE NOTES
Epidural Block    Patient location during procedure: OB  Reason for block: labor epidural  Staffing  Performed: anesthesiologist   Anesthesiologist: Myles Yousif MD  Performed by: Myles Yousif MD  Authorized by: Myles Yousif MD    Epidural  Patient position: sitting  Prep: DuraPrep  Patient monitoring: cardiac monitor, continuous pulse ox and frequent blood pressure checks  Approach: midline  Location: L2-3  Injection technique: RELL saline  Provider prep: mask and sterile gloves  Needle  Needle type: Tuohy   Needle gauge: 17 G  Needle length: 3.5 in  Needle insertion depth: 7 cm  Catheter type: end hole  Catheter size: 18 G  Catheter at skin depth: 12 cmCatheter Secured: tegaderm and tape  Assessment  Sensory level: T6  Events: None  Hemodynamics: stable  Attempts: 1  Outcomes: uncomplicated and patient tolerated procedure well  Preanesthetic Checklist  Completed: patient identified, IV checked, site marked, risks and benefits discussed, surgical/procedural consents, equipment checked, pre-op evaluation, timeout performed, anesthesia consent given, oxygen available, monitors applied/VS acknowledged and fire risk safety assessment completed and verbalized

## 2024-10-08 NOTE — PROGRESS NOTES
TRANSFER - IN REPORT:    Verbal report received from SOPHIE Steinberg on Cole Naylor  being received from L&D for routine progression of patient care      Report consisted of patient's Situation, Background, Assessment and   Recommendations(SBAR).     Information from the following report(s) Nurse Handoff Report, Adult Overview, Intake/Output, MAR, and Recent Results was reviewed with the receiving nurse.    Opportunity for questions and clarification was provided.      Assessment completed upon patient's arrival to unit and care assumed.      1718: Magnesium verified with SOPHIE Steinberg.    2000: Dr. Brito made aware of severe range pressure.

## 2024-10-08 NOTE — L&D DELIVERY NOTE
Wallace Naylor [[id number]]      Labor Events     Labor: No   Steroids: Full Course  Cervical Ripening Date/Time:  10/7/24 20:16:00   Cervical Ripening Type: Misoprostol  Antibiotics Received during Labor: No  Rupture Date/Time:  10/8/24 12:48:00   Rupture Type: AROM, Intact  Fluid Color: Clear  Fluid Odor: None  Induction: Cervical Ripening Balloon       Anesthesia    Method: Epidural       Delivery Details      Delivery Date: 10/8/24 Delivery Time: 14:43:00   Delivery Type: Vaginal, Spontaneous              [place] Presentation    Presentation: Vertex       Shoulder Dystocia    Shoulder Dystocia Present?: No       Assisted Delivery Details    Forceps Attempted?: No  Vacuum Extractor Attempted?: No                           Cord    Vessels: 3 Vessels  Complications: None  Delayed Cord Clamping?: Yes  Cord Clamped Date/Time: 10/8/2024 14:49:30  Cord Blood Disposition: Lab  Gases Sent?: No              Placenta    Date/Time: 10/8/2024 14:51:12  Removal: Spontaneous  Appearance: Intact       Lacerations    Episiotomy: None  Perineal Lacerations: None  Other Lacerations: labial laceration  Labial Laceration: right Repaired?: Yes   Number of Repair Packets: 1       Blood Loss  Mother: Cole Naylor #[id number]     Start of Mother's Information      Delivery Blood Loss   Intrapartum & Postpartum: 10/08/24 0243 - 10/08/24 1508    Delivery Admission: 10/01/24 1153 - 10/08/24 1508         Intrapartum & Postpartum Delivery Admission    None                  End of Mother's Information  Mother: Cole Naylor #[id number]                Delivery Providers    Delivering clinician: Monica Hoffman MD     Provider Role    Monica Hoffman MD Obstetrician    Maryan Echeverria RN Primary Nurse    Case, Charmaine HENSON RN Primary  Nurse     NICU Nurse     Neonatologist     Anesthesiologist     Nurse Anesthetist     Nurse Practitioner     Midwife     Nursery Nurse     Respiratory Therapist

## 2024-10-08 NOTE — PROGRESS NOTES
0740: Report received from SOPHIE Mooney.    0820: MD Hoffman in to see patient.     0840: Pt eating, desires to wait to start pitocin after breakfast    1025: CRB removed easily with gentle traction. Pt tolerated well, and reports relief    1030: Pt up and moving around the room, reports it feels better to dance and move through ctx     Mag bolus: 1148    Mag drip: 1210    1315: MD Hoffman at . SVE 7-8//0    1335 MD Hoffman back to see pt    [id number]: RN continuously at bs. Pt c/o excruciating back pain. Comfort measures offered, including position changes, cold therapy, counterpressure, reassurance, guided breathing, double hip squeeze, distraction.     1354: MD Hoffman at bs. SVE 9/100/0. Pt desires epidural     1420: MD Yousif at  for epidural placement    1431: Epidural recovery started. Pt repositioned to left side    1434: Pt reports relief from back pain.    1442: Pt feels rectal pressure. SVE 10/100/+4. Provider called to bs.     1443: Delivery of baby boy    1515: Straight cath 400mL      Pt desires to take placenta home. Placenta release signed by patient. Pt and  informed the placenta must be taken off the premises in a cooler at time of release to the patient. Pt and  verbalize understanding, pt's  will take it out of the hospital now.

## 2024-10-08 NOTE — PROGRESS NOTES
Labor Progress Note    Patient: Cole Naylor YOB: 2000  Age: 24 y.o.     Subjective:     Cole reports increased pain after the cook catheter placement this morning.  Was able willy hold down her nifedipine after the second attempt recently.     Objective:     Vital Signs:  BP (!) 143/102   Pulse 79   Temp 98.2 °F (36.8 °C) (Oral)   Resp 18   LMP 01/23/2024 (Exact Date)   SpO2 99%      Cervical Exam:    Deferred (2cm/40/-3 by CELINA San recently)    FHR cat 1, reactive  [place] ctx every 3-4 mins    Lab/Data Review:  Recent Results (from the past 24 hour(s))   TYPE AND SCREEN    Collection Time: 10/07/24  7:12 PM   Result Value Ref Range    Crossmatch expiration date 10/10/2024,2359     ABO/Rh O POSITIVE     Antibody Screen NEG    Comprehensive Metabolic Panel    Collection Time: 10/08/24  6:07 AM   Result Value Ref Range    Sodium 135 (L) 136 - 145 mmol/L    Potassium 3.6 3.5 - 5.1 mmol/L    Chloride 104 97 - 108 mmol/L    CO2 24 21 - 32 mmol/L    Anion Gap 7 2 - 12 mmol/L    Glucose 88 65 - 100 mg/dL    BUN 6 6 - 20 MG/DL    Creatinine 0.51 (L) 0.55 - 1.02 MG/DL    BUN/Creatinine Ratio 12 12 - 20      Est, Glom Filt Rate >90 >60 ml/min/1.73m2    Calcium 10.0 8.5 - 10.1 MG/DL    Total Bilirubin 0.6 0.2 - 1.0 MG/DL     (H) 12 - 78 U/L     (H) 15 - 37 U/L    Alk Phosphatase 229 (H) 45 - 117 U/L    Total Protein 7.2 6.4 - 8.2 g/dL    Albumin 3.0 (L) 3.5 - 5.0 g/dL    Globulin 4.2 (H) 2.0 - 4.0 g/dL    Albumin/Globulin Ratio 0.7 (L) 1.1 - 2.2         Assessment/Plan:     Principal Problem:    Chronic hypertension with superimposed preeclampsia  Active Problems:    36 weeks gestation of pregnancy    Medication management  Resolved Problems:    * No resolved hospital problems. *    IOL for SI preE. GBS neg. Bps mild range.   - start pitocin  - likely amniotomy in a few hours  - LFTs katelyn again this morning; check CBC  - cont to closely monitor Bps; continue home nifedipine 30 XR

## 2024-10-08 NOTE — PROCEDURES
PROCEDURE NOTE  Date: 10/8/2024   Name: Cole Naylor  YOB: 2000    Procedures    Cervical Catheter insertion procedure note    Cole Naylor is a ,  24 y.o. female who presents today far placement of a cervical catheter in the cervix for ripening. Her cervix is unfavorable and she is scheduled for induction tomorrow.   She has elected to have a cervical catheter placement today. The risks, benefits and assets of the procedure were discussed. Her questions were answered.   PROCEDURE: A Cook catheter was placed through the cervix via manual exam without difficulty. The uterine bulb was inflated with 60 cc of saline. The cervical balloon was inflated to 60 cc of saline.  Bleeding was minimal. The patient's level of discomfort was minimal.   POST PROCEDURE: The patient tolerated the procedure well. There were no complications.   She was admitted for monitoring overnight.  She was given labor and ROM warnings.      NENITA Silverio CNM

## 2024-10-08 NOTE — PROGRESS NOTES
Notified by RN team that pt recently had a severe range BP, confirmed on repeat.    Order for 5mg IV hydralazine with hydralazine protocol place. Also ordered IV magnesium therapy.   She is hurting with contractions, induction progressing, anticipate .     Monica Hoffman MD    _________________    1200 update    Pt's BP 15 minutes after IV hydral still severe range 170/100  Give 10mg IV hydralazine now.  IV mag 4g bolus is infusing    SVE 5-6/7/-2    FHR cat 1, reactive  [place] ctx every 2-3 mins    Discussed r/b/a amniotomy to help with labor progression given now diagnosis of severe pre-eclampsia.   She is unsure, discussed will check back soon and that she is transitioning into active labor.   Declines pain management at this time.       Monica Hoffman MD

## 2024-10-08 NOTE — PROGRESS NOTES
Labor Progress Note  Patient seen, fetal heart rate and contraction pattern evaluated, patient examined.  BP (!) 150/97   Pulse 100   Temp 98.2 °F (36.8 °C) (Oral)   Resp 18   LMP 01/23/2024 (Exact Date)   SpO2 99%     Physical Exam:  Cervical Exam:  Deferred  Membranes:  Intact  Uterine Activity: Every 3-4 min  Fetal Heart Rate: Reactive    Assessment/Plan:  Reassuring fetal status, Continue plan for vaginal delivery.  Pt feeling better after dilaudid.  Encouraged to sleep.   Has received two doses of miso, could receive next dose at 0800.  Will reassess in 2-3 hours for bulb placement.     Ilia San, NENITA - CELINA

## 2024-10-08 NOTE — PROGRESS NOTES
Checked back in with pt. She is using nitrous oxide for pain management, no significant relief.     Recommended amniotomy to help with labor progression and she now accepts.      SVE 6/80/-2  AROM performed with return of a small-mod amount of clear fluid  Reassuring fetal surveillance throughout.     She received 20mg IV labetalol for continued severe range BP.   Pending BP recheck.      Monica Hoffman MD      _________________    Update 1320  Pt given 40mg IV labetalol for severe range BP.   Last /93    She called out as she is feeling more pressure.   SVE 7-8/80/0      Monica Hoffman MD    ______________    Update 1400    Pt still has the presence of now unbearable low back pain. Not able to get rleief with position changes.   SVE 9cm  Suspect OP  She now desires epidural. Plan epidural, rest and then recheck cervix.     Monica Hoffman MD

## 2024-10-08 NOTE — PROGRESS NOTES
1930: Bedside and Verbal shift change report given to BRIANNE Mooney RN (oncoming nurse) by SIXTO Myers RN (offgoing nurse). Report included the following information Nurse Handoff Report, Adult Overview, MAR, Recent Results, Med Rec Status, and Event Log.    2016: Cytotec given. See MAR.  2056: Patient up to the bathroom. Going to try Novii monitor after   2105: Novii monitor applied, not reading  2113: Switched back to [facility].   0017: Patient requesting pain medication. Orders received from ALDO San CNM   0026: IV pain meds given  0332: Patient called out stating she got sick x3. Zofran offered. Patient declined, only wants pain meds but told her she cannot have anymore until 0430.   0400: Contractions spaced, in to give another dose of cytotec. Patient sleeping.  0405: Cytotec given (this is her second dose). See MAR.  0605: AM oral meds given. See MAR. However, shortly after ~10min, patient started vomiting. Pills were not visible but patient feels as if they came back up. Will monitor BP closely and treat if necessary.   0616: Zofran given   0715: CNM made aware of lab results.   0722: CNM at bedside to check cervix and evaluate for a balloon  0728: SVE 2/40/-3. Balloon placed. 60/60 in each.   0730: Provider and oncoming nurse notified of patient's vomiting spell this morning and made aware of the possibility of throwing up the pills. Telephone orders to give another dose of procardia now.   0745: Bedside and Verbal shift change report given to ALDO Montague RN (oncoming nurse) by BRIANNE Mooney RN (offgoing nurse). Report included the following information Nurse Handoff Report, Adult Overview, Intake/Output, MAR, Recent Results, Med Rec Status, and Event Log.

## 2024-10-08 NOTE — ANESTHESIA POSTPROCEDURE EVALUATION
Department of Anesthesiology  Postprocedure Note    Patient: Cole Naylor  MRN: [id number]  YOB: 2000  Date of evaluation: 10/8/2024    Procedure Summary       Date: 10/08/24 Room / Location:     Anesthesia Start: 1420 Anesthesia Stop: 1443    Procedure: Labor Analgesia Diagnosis:     Scheduled Providers:  Responsible Provider: Myles Yousif MD    Anesthesia Type: epidural ASA Status: 2            Anesthesia Type: No value filed.    Garfield Phase I:      Garfield Phase II:      Anesthesia Post Evaluation    Patient location during evaluation: PACU  Patient participation: complete - patient participated  Level of consciousness: responsive to verbal stimuli and sleepy but conscious  Pain score: 2  Airway patency: patent  Cardiovascular status: blood pressure returned to baseline  Respiratory status: acceptable  Hydration status: stable  Comments: +Post-Anesthesia Evaluation and Assessment    Patient: Cole Naylor MRN: [id number]  SSN: [id number]   YOB: 2000  Age: 24 y.o.  Sex: female          Cardiovascular Function/Vital Signs    BP (!) 146/98   Pulse (!) 125   Temp 98.3 °F (36.8 °C) (Oral)   Resp 16   SpO2 99%   Breastfeeding Unknown     Patient is status post * No procedures listed *.    Nausea/Vomiting: Controlled.    Postoperative hydration reviewed and adequate.    Pain:      Managed.    Neurological Status:       At baseline.    Mental Status and Level of Consciousness: Arousable.    Pulmonary Status:       Adequate oxygenation and airway patent.    Complications related to anesthesia: None    Post-anesthesia assessment completed. No concerns.    I have evaluated the patient and the patient is stable and ready to be discharged from PACU .    Signed By: Myles Yousif MD    10/8/2024    Multimodal analgesia pain management approach  Pain management: satisfactory to patient    No notable events documented.

## 2024-10-08 NOTE — ANESTHESIA PRE PROCEDURE
Department of Anesthesiology  Preprocedure Note       Name:  Cole Naylor   Age:  24 y.o.  :  2000                                          MRN:  [id number]         Date:  10/8/2024      Surgeon: * No surgeons listed *    Procedure: * No procedures listed *    Medications prior to admission:   Prior to Admission medications    Medication Sig Start Date End Date Taking? Authorizing Provider   Prenatal Vit-Fe Fumarate-FA (PRENATAL VITAMIN) 28-0.8 MG TABS tablet Take 1 tablet by mouth daily 24   Zaira Aguilar MD   ferrous sulfate (IRON 325) 325 (65 Fe) MG tablet Take 1 tablet by mouth Daily with supper 24   Monica Hoffman MD   blood glucose monitor kit and supplies Use as directed to check blood glucose level at home 4 times daily: fasting, and 1hr after each meal Please send in blood sugar supplies covered by insurance  Patient not taking: Reported on 10/1/2024 9/3/24   Monica Hoffman MD   blood glucose monitor strips Use as directed to check blood glucose level at home 4 times daily: fasting, and 1hr after each meal please send in blood sugar supplies covered by insurance  Patient not taking: Reported on 10/1/2024 9/3/24   Monica Hoffman MD   Lancets MISC 1 each by Does not apply route 4 times daily Use as directed to check blood glucose level at home 4 times daily: fasting, and 1hr after each meal please send in blood sugar supplies covered by insurance  Patient not taking: Reported on 10/1/2024 9/3/24   Monica Hoffman MD   NIFEdipine (ADALAT CC) 30 MG extended release tablet Take 1 tablet by mouth in the morning and at bedtime 9/3/24   Monica Hoffman MD   blood glucose monitor kit and supplies Dispense sufficient amount for indicated testing frequency plus additional to accommodate PRN testing needs. Dispense all needed supplies to include: monitor, strips, lancing device, lancets, control solutions, alcohol swabs.  Pt will be testing 4 x daily 24   Ilia San APRN -

## 2024-10-08 NOTE — PRE-PROCEDURE INSTRUCTIONS
Labor Progress Note  Patient seen, fetal heart rate and contraction pattern evaluated, patient examined.  BP (!) 150/97   Pulse 100   Temp 98.2 °F (36.8 °C) (Oral)   Resp 18   LMP 01/23/2024 (Exact Date)   SpO2 99%     Physical Exam:  Cervical Exam:  2 cm dilated    50% effaced    -3 station    Membranes:  Intact  Uterine Activity: Every 2 min  Fetal Heart Rate: Baseline: 130 per minute  Variability: moderate  Accelerations: yes  Decelerations: none    Assessment/Plan:  Reassuring fetal status, Continue plan for vaginal delivery.  CE changed 2/40/-3  Cook's cath placed.   Liver enzymes rising. Consider repeating other H labs.   Pt vomited morning dose of procardia.   Recommend nausea meds and repeat procardia.   Okay to start pit of contractions space.   Handoff to Yasmin LUCAS done.     Ilia San, NENITA - CELINA

## 2024-10-09 LAB
ALBUMIN SERPL-MCNC: 2.5 G/DL (ref 3.5–5)
ALBUMIN/GLOB SERPL: 0.6 (ref 1.1–2.2)
ALP SERPL-CCNC: 184 U/L (ref 45–117)
ALT SERPL-CCNC: 255 U/L (ref 12–78)
ANION GAP SERPL CALC-SCNC: 7 MMOL/L (ref 2–12)
AST SERPL-CCNC: 138 U/L (ref 15–37)
BILIRUB SERPL-MCNC: 0.3 MG/DL (ref 0.2–1)
BUN SERPL-MCNC: 3 MG/DL (ref 6–20)
BUN/CREAT SERPL: 6 (ref 12–20)
CALCIUM SERPL-MCNC: 7.7 MG/DL (ref 8.5–10.1)
CHLORIDE SERPL-SCNC: 106 MMOL/L (ref 97–108)
CO2 SERPL-SCNC: 25 MMOL/L (ref 21–32)
CREAT SERPL-MCNC: 0.52 MG/DL (ref 0.55–1.02)
GLOBULIN SER CALC-MCNC: 4.1 G/DL (ref 2–4)
GLUCOSE SERPL-MCNC: 73 MG/DL (ref 65–100)
POTASSIUM SERPL-SCNC: 3.4 MMOL/L (ref 3.5–5.1)
PROT SERPL-MCNC: 6.6 G/DL (ref 6.4–8.2)
SODIUM SERPL-SCNC: 138 MMOL/L (ref 136–145)

## 2024-10-09 PROCEDURE — 36415 COLL VENOUS BLD VENIPUNCTURE: CPT

## 2024-10-09 PROCEDURE — 80053 COMPREHEN METABOLIC PANEL: CPT

## 2024-10-09 PROCEDURE — 6360000002 HC RX W HCPCS: Performed by: OBSTETRICS & GYNECOLOGY

## 2024-10-09 PROCEDURE — 1120000000 HC RM PRIVATE OB

## 2024-10-09 PROCEDURE — 6370000000 HC RX 637 (ALT 250 FOR IP): Performed by: OBSTETRICS & GYNECOLOGY

## 2024-10-09 RX ORDER — NIFEDIPINE 10 MG/1
10 CAPSULE ORAL
Status: COMPLETED | OUTPATIENT
Start: 2024-10-09 | End: 2024-10-09

## 2024-10-09 RX ADMIN — DOCUSATE SODIUM 100 MG: 100 CAPSULE, LIQUID FILLED ORAL at 08:13

## 2024-10-09 RX ADMIN — ACETAMINOPHEN 1000 MG: 500 TABLET ORAL at 13:41

## 2024-10-09 RX ADMIN — NIFEDIPINE 10 MG: 10 CAPSULE ORAL at 09:42

## 2024-10-09 RX ADMIN — IBUPROFEN 800 MG: 400 TABLET, FILM COATED ORAL at 08:13

## 2024-10-09 RX ADMIN — ACETAMINOPHEN 1000 MG: 500 TABLET ORAL at 21:21

## 2024-10-09 RX ADMIN — IBUPROFEN 800 MG: 400 TABLET, FILM COATED ORAL at 17:11

## 2024-10-09 RX ADMIN — NIFEDIPINE 30 MG: 30 TABLET, FILM COATED, EXTENDED RELEASE ORAL at 21:21

## 2024-10-09 RX ADMIN — NIFEDIPINE 30 MG: 30 TABLET, FILM COATED, EXTENDED RELEASE ORAL at 08:13

## 2024-10-09 RX ADMIN — IBUPROFEN 800 MG: 400 TABLET, FILM COATED ORAL at 01:40

## 2024-10-09 RX ADMIN — MAGNESIUM SULFATE HEPTAHYDRATE 2000 MG/HR: 40 INJECTION, SOLUTION INTRAVENOUS at 08:12

## 2024-10-09 RX ADMIN — ACETAMINOPHEN 1000 MG: 500 TABLET ORAL at 06:39

## 2024-10-09 NOTE — PROGRESS NOTES
Bedside and Verbal shift change report given to MOUSTAPHA Villanueva RN  (oncoming nurse) by RON Bergman RN  (offgoing nurse). Report included the following information SBAR, Kardex, Intake/Output, MAR, and Recent Results.      Pt given PP booklet and reviewed.      0919 MD Hoffman messaged regarding  severe range Bps.     0932- MD Hoffman called, message left.     0933- MD Hoffman messaged regarding repeat BP    0934- Midwife called, no answer.     0936- [facility] office called, MD Hoffman called unit back while on phone; orders received from MD.     0942- 10mg Procardia given.

## 2024-10-09 NOTE — PROGRESS NOTES
Post-Partum Day Number 1 Progress Note    Cole Naylor     Assessment: Doing well, post partum day 1    CHTN with SI preE: Bps mild range overnight.  Severe range Bps yesterday in labor s/p multiple doses of IV antihypertensives.  On mag. LFTs stable this morning.   - cont mag until 24 hours PP  - cont monitor Bps; cont nifedipine 30 XR bid    Plan:  1. Continue routine postpartum and perineal care as well as maternal education.  2. The risks and benefits of the circumcision  procedure and anesthesia including: bleeding, infection, variability of cosmetic results were discussed at length with the mother. She is aware that future repeat procedures may be necessary. She gives informed consent to proceed as noted and her questions are answered.     Information for the patient's :  Wallace Naylor [[id number]]   Vaginal, Spontaneous     S:  Patient doing well without significant complaint.  Voiding without difficulty, normal lochia.     Vitals:  BP (!) 148/105   Pulse 100   Temp 97.8 °F (36.6 °C) (Oral)   Resp 18   LMP 2024 (Exact Date)   SpO2 99%   Breastfeeding Unknown   Temp (24hrs), Av.2 °F (36.8 °C), Min:97.8 °F (36.6 °C), Max:98.5 °F (36.9 °C)        Exam:   Patient without distress.                Abdomen soft, fundus firm, nontender                Perineum with normal lochia noted.                Lower extremities are negative for swelling, cords or tenderness.    Labs:     Lab Results   Component Value Date/Time    WBC 15.4 10/08/2024 09:25 AM    WBC 13.7 10/07/2024 06:04 AM    WBC 16.4 10/04/2024 05:32 AM    WBC 12.6 10/01/2024 12:07 PM    WBC 16.1 2024 10:11 PM    WBC 11.9 2024 03:50 PM    WBC 15.6 2024 11:00 AM    WBC 14.3 05/10/2024 03:14 PM    WBC 16.0 2022 05:13 PM    WBC 17.5 2022 03:19 PM    WBC 13.1 2022 02:30 PM    WBC 12.8 2021 04:00 PM    WBC 12.8 2021 09:40 AM    HGB 12.5 10/08/2024 09:25 AM    HGB 12.5 10/07/2024  06:04 AM    HGB 11.6 10/04/2024 05:32 AM    HGB 11.3 10/01/2024 12:07 PM    HGB 11.4 09/26/2024 10:11 PM    HGB 11.0 09/13/2024 03:50 PM    HGB 11.1 09/03/2024 11:00 AM    HGB 13.1 05/10/2024 03:14 PM    HGB 8.0 02/03/2022 05:13 PM    HGB 10.0 01/31/2022 03:19 PM    HGB 9.5 01/29/2022 02:30 PM    HGB 10.6 12/30/2021 04:00 PM    HGB 13.0 09/07/2021 09:40 AM    HCT 39.4 10/08/2024 09:25 AM    HCT 39.3 10/07/2024 06:04 AM    HCT 36.7 10/04/2024 05:32 AM    HCT 34.8 10/01/2024 12:07 PM    HCT 35.5 09/26/2024 10:11 PM    HCT 33.7 09/13/2024 03:50 PM    HCT 36.0 09/03/2024 11:00 AM    HCT 37.9 05/10/2024 03:14 PM    HCT 25.8 02/03/2022 05:13 PM    HCT 31.6 01/31/2022 03:19 PM    HCT 30.3 01/29/2022 02:30 PM    HCT 33.3 12/30/2021 04:00 PM    HCT 40.8 09/07/2021 09:40 AM     10/08/2024 09:25 AM     10/07/2024 06:04 AM     10/04/2024 05:32 AM     10/01/2024 12:07 PM     09/26/2024 10:11 PM     09/13/2024 03:50 PM     09/03/2024 11:00 AM     05/10/2024 03:14 PM     02/03/2022 05:13 PM     01/31/2022 03:19 PM     01/29/2022 02:30 PM     12/30/2021 04:00 PM     09/07/2021 09:40 AM       Recent Results (from the past 24 hour(s))   CBC    Collection Time: 10/08/24  9:25 AM   Result Value Ref Range    WBC 15.4 (H) 3.6 - 11.0 K/uL    RBC 4.84 3.80 - 5.20 M/uL    Hemoglobin 12.5 11.5 - 16.0 g/dL    Hematocrit 39.4 35.0 - 47.0 %    MCV 81.4 80.0 - 99.0 FL    MCH 25.8 (L) 26.0 - 34.0 PG    MCHC 31.7 30.0 - 36.5 g/dL    RDW 16.0 (H) 11.5 - 14.5 %    Platelets 316 150 - 400 K/uL    MPV 10.1 8.9 - 12.9 FL    Nucleated RBCs 0.0 0  WBC    nRBC 0.00 0.00 - 0.01 K/uL   Comprehensive Metabolic Panel    Collection Time: 10/09/24  6:44 AM   Result Value Ref Range    Sodium 138 136 - 145 mmol/L    Potassium 3.4 (L) 3.5 - 5.1 mmol/L    Chloride 106 97 - 108 mmol/L    CO2 25 21 - 32 mmol/L    Anion Gap 7 2 - 12 mmol/L    Glucose 73 65 - 100 mg/dL

## 2024-10-09 NOTE — LACTATION NOTE
This note was copied from a baby's chart.  Infant born vaginally yesterday to a  mom at 37 weeks gestation. Mom is on magnesium. Mom states she nursed her first child for 2 weeks. Per mom, infant has been sleepy today (post circ), but had eaten better prior to this. At the time of my visit, infant sleepy. Attempted to latch with infant taking a few sucks before coming off. Hand expressed approximately 1 ml and spoon/finger fed to infant. Showed mom how to hand express colostrum to give to infant. Mom requesting a breast pump so she can also pump, if infant not latching. Provided pump with instructions for use. Encouraged mom to call for assistance as needed.

## 2024-10-09 NOTE — PROGRESS NOTES
Notified [place] RN team of severe range BP (I was scrubbed in a surgery until a few mintues ago).   BP severe confirmed on repeat.   Given 10mg IR nifedipine now and repeat BP.      Monica Hoffman MD

## 2024-10-09 NOTE — PROGRESS NOTES
Bedside and Verbal shift change report given to Lupe RUSH RN (oncoming nurse) by Cindi RUSH RN (offgoing nurse). Report included the following information Nurse Handoff Report, Index, Intake/Output, MAR, and Recent Results.

## 2024-10-10 LAB
ALBUMIN SERPL-MCNC: 2.3 G/DL (ref 3.5–5)
ALBUMIN/GLOB SERPL: 0.7 (ref 1.1–2.2)
ALP SERPL-CCNC: 153 U/L (ref 45–117)
ALT SERPL-CCNC: 221 U/L (ref 12–78)
ANION GAP SERPL CALC-SCNC: 4 MMOL/L (ref 2–12)
AST SERPL-CCNC: 95 U/L (ref 15–37)
BILIRUB SERPL-MCNC: 0.3 MG/DL (ref 0.2–1)
BUN SERPL-MCNC: 7 MG/DL (ref 6–20)
BUN/CREAT SERPL: 12 (ref 12–20)
CALCIUM SERPL-MCNC: 9 MG/DL (ref 8.5–10.1)
CHLORIDE SERPL-SCNC: 107 MMOL/L (ref 97–108)
CO2 SERPL-SCNC: 28 MMOL/L (ref 21–32)
CREAT SERPL-MCNC: 0.6 MG/DL (ref 0.55–1.02)
ERYTHROCYTE [DISTWIDTH] IN BLOOD BY AUTOMATED COUNT: 16.5 % (ref 11.5–14.5)
GLOBULIN SER CALC-MCNC: 3.5 G/DL (ref 2–4)
GLUCOSE SERPL-MCNC: 95 MG/DL (ref 65–100)
HCT VFR BLD AUTO: 36.1 % (ref 35–47)
HGB BLD-MCNC: 11.3 G/DL (ref 11.5–16)
MCH RBC QN AUTO: 25.6 PG (ref 26–34)
MCHC RBC AUTO-ENTMCNC: 31.3 G/DL (ref 30–36.5)
MCV RBC AUTO: 81.9 FL (ref 80–99)
NRBC # BLD: 0 K/UL (ref 0–0.01)
NRBC BLD-RTO: 0 PER 100 WBC
PLATELET # BLD AUTO: 315 K/UL (ref 150–400)
PMV BLD AUTO: 9.9 FL (ref 8.9–12.9)
POTASSIUM SERPL-SCNC: 3.5 MMOL/L (ref 3.5–5.1)
PROT SERPL-MCNC: 5.8 G/DL (ref 6.4–8.2)
RBC # BLD AUTO: 4.41 M/UL (ref 3.8–5.2)
SODIUM SERPL-SCNC: 139 MMOL/L (ref 136–145)
WBC # BLD AUTO: 17.1 K/UL (ref 3.6–11)

## 2024-10-10 PROCEDURE — 80053 COMPREHEN METABOLIC PANEL: CPT

## 2024-10-10 PROCEDURE — 1120000000 HC RM PRIVATE OB

## 2024-10-10 PROCEDURE — 85027 COMPLETE CBC AUTOMATED: CPT

## 2024-10-10 PROCEDURE — 99024 POSTOP FOLLOW-UP VISIT: CPT | Performed by: OBSTETRICS & GYNECOLOGY

## 2024-10-10 PROCEDURE — 6370000000 HC RX 637 (ALT 250 FOR IP): Performed by: OBSTETRICS & GYNECOLOGY

## 2024-10-10 PROCEDURE — 36415 COLL VENOUS BLD VENIPUNCTURE: CPT

## 2024-10-10 RX ORDER — LABETALOL 200 MG/1
200 TABLET, FILM COATED ORAL EVERY 12 HOURS SCHEDULED
Status: DISCONTINUED | OUTPATIENT
Start: 2024-10-10 | End: 2024-10-11 | Stop reason: HOSPADM

## 2024-10-10 RX ADMIN — LABETALOL HYDROCHLORIDE 200 MG: 200 TABLET, FILM COATED ORAL at 09:12

## 2024-10-10 RX ADMIN — NIFEDIPINE 30 MG: 30 TABLET, FILM COATED, EXTENDED RELEASE ORAL at 20:43

## 2024-10-10 RX ADMIN — IBUPROFEN 800 MG: 400 TABLET, FILM COATED ORAL at 00:53

## 2024-10-10 RX ADMIN — IBUPROFEN 800 MG: 400 TABLET, FILM COATED ORAL at 16:52

## 2024-10-10 RX ADMIN — ACETAMINOPHEN 1000 MG: 500 TABLET ORAL at 20:42

## 2024-10-10 RX ADMIN — ACETAMINOPHEN 1000 MG: 500 TABLET ORAL at 05:01

## 2024-10-10 RX ADMIN — NIFEDIPINE 30 MG: 30 TABLET, FILM COATED, EXTENDED RELEASE ORAL at 08:09

## 2024-10-10 RX ADMIN — DOCUSATE SODIUM 100 MG: 100 CAPSULE, LIQUID FILLED ORAL at 08:09

## 2024-10-10 RX ADMIN — IBUPROFEN 800 MG: 400 TABLET, FILM COATED ORAL at 08:09

## 2024-10-10 RX ADMIN — LABETALOL HYDROCHLORIDE 200 MG: 200 TABLET, FILM COATED ORAL at 20:42

## 2024-10-10 ASSESSMENT — PAIN DESCRIPTION - LOCATION: LOCATION: ABDOMEN

## 2024-10-10 ASSESSMENT — PAIN DESCRIPTION - ORIENTATION: ORIENTATION: MID

## 2024-10-10 ASSESSMENT — PAIN DESCRIPTION - DESCRIPTORS: DESCRIPTORS: ACHING;CRAMPING;DISCOMFORT

## 2024-10-10 ASSESSMENT — PAIN SCALES - GENERAL: PAINLEVEL_OUTOF10: 3

## 2024-10-10 NOTE — DISCHARGE INSTRUCTIONS
Postpartum Discharge Instructions    General activities  For vaginal deliveries, be up and moving around but remember to rest! Your body grew and birthed a small human! Be kind to yourself and allow your body to heal. You may gradually resume your normal activities as soon as you feel up to it. You may begin walking and strolling with the baby when you feel ready. Stay close to home the first few times in case you tire quickly. Do not push yourself. This is not about getting fit fast. This is allowing your body to have some movement which will aid in healing. Fresh air and sunshine are refreshing for both you and your baby.  Avoid heavy lifting, strenuous exercise, and excessive stair climbing.  If you delivered by  section, take your time. Give yourself some julissa! You should be up and moving multiple times per day, this will help you to feel better faster. However, be mindful and do not push yourself. If you have a day that you feel very uncomfortable, you likely did too much the day before. Rest and recognize your body is not ready for that level of activity yet. You will get there soon.  A good rule to follow is that you should not lift anything heavier than your baby in the care seat for the first 2 weeks. Moms with toddlers at home have children climb up to you on the couch to snuggle. If you do not have help at home post  and have multiple children to care for, please reach out to us.  Driving is individual. 7-14 days is a good rule of thumb. The first time you drive please have someone with you that can take over if you suddenly feel you cannot continue. Do not drive if you are taking narcotics for pain relief.  Shower as often as you like. You can even take a bath. An Epsom salt bath may help you feel better after delivery. For  deliveries, keep the water level below your incision. There should be nothing placed in the vagina until after your postpartum checkup. This means no  over-the counter pain relievers. Engorgement usually resolves in two to three days.  If you are struggling with breastfeeding or have questions about breastfeeding, the lactation [facility] number is [phone]. They can provide you with great tips and help you get connected with a support group.  Further lactation/breastfeeding resources are including in your hospital discharge instructions.  If you have a fever higher than 100.4F, a lump, or red, sore, or hot area in the breast that does not go away after nursing, please call the office so we can help you.    Postpartum mood changes  Families go through many changes when a new baby is born. All family members are adjusting. It is very common for mothers to experience a wide range of emotions. Mild feeling of sadness, depression, and anxiety are common and are due to hormonal changes, lack of sleep, and the demanding job of caring for a . These are frequently referred to as the “baby blues.” These feelings are usually temporary, self-limiting, and usually level out by two weeks after delivery.  Postpartum depression is less common and more serious. It is characterized by feelings of depression, anxiety, worry, hopelessness, crying, feeling like you can't take care of the baby, etc. If you are feeling any of these things, please CALL US! We will help you! Rarely, it may also include thoughts of hurting yourself, others, or the baby. If you are experiencing these thoughts or feelings, seek help immediately. Call 911, go to the nearest emergency room, and contact our office.  These conditions do not reflect your feelings for you baby, your partner, or your abilities as a mother. You cannot control these feeling, and it is not your fault. Again, please call us if you are feeling any of these symptoms prior to your 6 weeks visit.     When to call  Frequent urgency or burning upon urination that is painful  Temperature of 100.4F or above  Increasing pain that is

## 2024-10-10 NOTE — PROGRESS NOTES
Bedside and Verbal shift change report given to clifton rn  (oncoming nurse) by sara elizabeth  (offgoing nurse). Report included the following information Nurse Handoff Report, Intake/Output, MAR, Recent Results, and Med Rec Status.       1245- Noted Dr. Hoffman's note of discharge on pt. Pt was seen by Dr. Hoffman this am. Request by MD for repeat CMP, which was completed. Now able to pursue and continue with discharge process.

## 2024-10-10 NOTE — PROGRESS NOTES
Post-Partum Day Number 2 Progress Note    Cole Naylor     Assessment: Doing well, post partum day 2    CHTN with SI preE: Bps mild range past 24 hrs.  Severe range Bps yesterday in labor s/p multiple doses of IV antihypertensives. S/p 24 hrs mag. LFTs stable this morning.   - cont mag until 24 hours PP  - cont monitor Bps; cont nifedipine 30 XR bid  - repeat CBC and CMP today    Plan:  1. Continue routine postpartum and perineal care as well as maternal education.    Information for the patient's :  Wallace Naylor [[id number]]   Vaginal, Spontaneous     S:  Patient doing well without significant complaint. Normal lochia. Pain controlled. Denies HA, vision changes, RUQ pain or worsening swelling. No fevers/chills. Ambulating and voiding without difficulty.    Vitals:  Patient Vitals for the past 24 hrs:   BP Temp Temp src Pulse Resp SpO2   10/10/24 0811 (!) 147/109 -- -- 84 -- 99 %   10/10/24 0800 -- 97.7 °F (36.5 °C) -- -- -- --   10/10/24 0455 (!) 141/83 -- -- 93 -- --   10/10/24 0045 (!) 150/99 98.3 °F (36.8 °C) Oral 90 16 100 %   10/09/24 2115 (!) 145/97 98.3 °F (36.8 °C) Oral (!) 102 16 97 %   10/09/24 1727 (!) 134/91 98.7 °F (37.1 °C) Oral (!) 102 18 96 %   10/09/24 1630 128/79 -- -- -- -- 98 %   10/09/24 1527 (!) 135/92 -- -- (!) 102 -- --   10/09/24 1427 (!) 145/95 98.2 °F (36.8 °C) Oral 94 20 99 %   10/09/24 1224 (!) 146/95 -- -- 92 -- 99 %   10/09/24 1124 130/87 -- -- (!) 110 -- 98 %   10/09/24 1119 -- -- -- -- -- 99 %   10/09/24 1043 -- -- -- -- -- 99 %   10/09/24 1041 -- -- -- -- -- 99 %   10/09/24 1040 -- -- -- -- -- 99 %   10/09/24 1024 (!) 143/94 -- -- (!) 120 -- 99 %   10/09/24 1014 (!) 143/95 -- -- (!) 101 -- 98 %   10/09/24 0959 (!) 154/104 -- -- (!) 101 -- 99 %   10/09/24 0944 (!) 156/112 -- -- (!) 108 -- 99 %   10/09/24 0930 (!) 164/108 -- -- (!) 121 -- 99 %   10/09/24 0915 (!) 167/99 -- -- (!) 112 -- 99 %   10/09/24 0901 (!) 170/104 -- -- (!) 112 -- 99 %           Exam:    previous visit (from the past 24 hour(s)).

## 2024-10-10 NOTE — PROGRESS NOTES
Bedside and Verbal shift change report given to MOUSTAPHA Villanueva RN  (oncoming nurse) by Len BARRIOS  (offgoing nurse). Report included the following information SBAR, Kardex, Intake/Output, MAR, and Recent Results.      0854- CBC obtained and sent to lab via tube system  MD Aguilar at bedside, reviewed Bps; orders to add 200mg Labetalol BID.

## 2024-10-10 NOTE — PROGRESS NOTES
Spiritual Health History and Assessment/Progress Note  [facility]    Initial Encounter,  , Life Adjustments,      Name: Cole Naylor MRN: [id number]    Age: 24 y.o.     Sex: female   Language: English   Oriental orthodox: [employer]   Chronic hypertension with superimposed preeclampsia     Date: 10/10/2024            Total Time Calculated: 10 min              Spiritual Assessment began in [facility]        Referral/Consult From: Rounding   Encounter Overview/Reason: Initial Encounter  Service Provided For: Patient, and baby in room with patient.    Cynthia, Belief, Meaning:   Patient identifies as spiritual and has beliefs or practices that help with coping during difficult times  Family/Friends No family/friends present      Importance and Influence:  Patient has spiritual/personal beliefs that influence decisions regarding their health  Family/Friends No family/friends present    Community:  Patient feels well-supported. Support system includes: Parent/s and Extended family  Family/Friends No family/friends present    Assessment and Plan of Care:     Patient Interventions include: Facilitated expression of thoughts and feelings and Affirmed coping skills/support systems  Family/Friends Interventions include: No family/friends present    Patient Plan of Care: Spiritual Care available upon further referral  Family/Friends Plan of Care: Spiritual Care available upon further referral    Electronically signed by SANDRA Adams on 10/10/2024 at 11:05 AM

## 2024-10-10 NOTE — LACTATION NOTE
This note was copied from a baby's chart.  Baby nursing well today,  deep latch obtained, mother is comfortable, baby feeding vigorously with rhythmic suck, swallow, breathe pattern, audible swallowing, and evident milk transfer, both breasts offered, baby is asleep following feeding.   Mother's milk has come in baby emptied both breasts well audibly gulping.  Mother given breast pads to wear for leaking milk.  At this time there is no need to pump.

## 2024-10-11 VITALS
OXYGEN SATURATION: 100 % | HEART RATE: 94 BPM | SYSTOLIC BLOOD PRESSURE: 146 MMHG | RESPIRATION RATE: 16 BRPM | DIASTOLIC BLOOD PRESSURE: 92 MMHG | TEMPERATURE: 98.6 F

## 2024-10-11 LAB
ALBUMIN SERPL-MCNC: 2.5 G/DL (ref 3.5–5)
ALBUMIN/GLOB SERPL: 0.7 (ref 1.1–2.2)
ALP SERPL-CCNC: 234 U/L (ref 45–117)
ALT SERPL-CCNC: 232 U/L (ref 12–78)
ANION GAP SERPL CALC-SCNC: 4 MMOL/L (ref 2–12)
AST SERPL-CCNC: 106 U/L (ref 15–37)
BILIRUB SERPL-MCNC: 0.3 MG/DL (ref 0.2–1)
BUN SERPL-MCNC: 8 MG/DL (ref 6–20)
BUN/CREAT SERPL: 14 (ref 12–20)
CALCIUM SERPL-MCNC: 8.8 MG/DL (ref 8.5–10.1)
CHLORIDE SERPL-SCNC: 110 MMOL/L (ref 97–108)
CO2 SERPL-SCNC: 26 MMOL/L (ref 21–32)
COMMENT:: NORMAL
CREAT SERPL-MCNC: 0.56 MG/DL (ref 0.55–1.02)
GLOBULIN SER CALC-MCNC: 3.8 G/DL (ref 2–4)
GLUCOSE SERPL-MCNC: 92 MG/DL (ref 65–100)
POTASSIUM SERPL-SCNC: 3.4 MMOL/L (ref 3.5–5.1)
PROT SERPL-MCNC: 6.3 G/DL (ref 6.4–8.2)
SODIUM SERPL-SCNC: 140 MMOL/L (ref 136–145)
SPECIMEN HOLD: NORMAL

## 2024-10-11 PROCEDURE — 6370000000 HC RX 637 (ALT 250 FOR IP): Performed by: OBSTETRICS & GYNECOLOGY

## 2024-10-11 PROCEDURE — 36415 COLL VENOUS BLD VENIPUNCTURE: CPT

## 2024-10-11 PROCEDURE — 80053 COMPREHEN METABOLIC PANEL: CPT

## 2024-10-11 RX ORDER — IBUPROFEN 800 MG/1
800 TABLET, FILM COATED ORAL EVERY 8 HOURS PRN
Qty: 40 TABLET | Refills: 0 | Status: SHIPPED | OUTPATIENT
Start: 2024-10-11

## 2024-10-11 RX ORDER — NIFEDIPINE 30 MG/1
30 TABLET, EXTENDED RELEASE ORAL 2 TIMES DAILY
Qty: 60 TABLET | Refills: 3 | Status: SHIPPED | OUTPATIENT
Start: 2024-10-11

## 2024-10-11 RX ORDER — LABETALOL 200 MG/1
200 TABLET, FILM COATED ORAL EVERY 12 HOURS SCHEDULED
Qty: 60 TABLET | Refills: 2 | Status: SHIPPED | OUTPATIENT
Start: 2024-10-11

## 2024-10-11 RX ADMIN — LABETALOL HYDROCHLORIDE 200 MG: 200 TABLET, FILM COATED ORAL at 08:34

## 2024-10-11 RX ADMIN — IBUPROFEN 800 MG: 400 TABLET, FILM COATED ORAL at 01:01

## 2024-10-11 RX ADMIN — NIFEDIPINE 30 MG: 30 TABLET, FILM COATED, EXTENDED RELEASE ORAL at 08:34

## 2024-10-11 RX ADMIN — DOCUSATE SODIUM 100 MG: 100 CAPSULE, LIQUID FILLED ORAL at 08:34

## 2024-10-11 RX ADMIN — IBUPROFEN 800 MG: 400 TABLET, FILM COATED ORAL at 08:34

## 2024-10-11 ASSESSMENT — PAIN SCALES - GENERAL: PAINLEVEL_OUTOF10: 3

## 2024-10-11 ASSESSMENT — PAIN DESCRIPTION - ORIENTATION: ORIENTATION: MID

## 2024-10-11 ASSESSMENT — PAIN DESCRIPTION - DESCRIPTORS: DESCRIPTORS: CRAMPING

## 2024-10-11 ASSESSMENT — PAIN DESCRIPTION - LOCATION: LOCATION: ABDOMEN

## 2024-10-11 NOTE — DISCHARGE SUMMARY
Obstetrical Discharge Summary     Name: Cole Naylor MRN: [id number]  SSN: [id number]    YOB: 2000  Age: 24 y.o.  Sex: female      Admit Date: 10/1/2024    Discharge Date: 10/11/2024     Admitting Physician: Monica Hoffman MD     Attending Physician:  Monica Hoffman MD     Admission Diagnoses: Chronic hypertension with superimposed preeclampsia [O11.9]  36 weeks gestation of pregnancy [Z3A.36]    Discharge Diagnoses:   Information for the patient's :  Wallace Naylor [[id number]]   @[id number]@     Additional Diagnoses:  No components found for: \"OBEXTABORH\", \"OBEXTABSCRN\", \"OBEXTRUBELLA\", \"OBEXTGRBS\"    Hospital Course: Cole was induced for super-imposed pre-eclampsia at 36w6d, she delivered at 37w0d.  Normal hospital course following the delivery.  She was maintained on IV magnesium for 24 hours after delivery due to severe range blood pressures in labor.  She did well postpartum, and was discharged home on antihypertensive regimen of nifedipine 30 mg XR PO bid and labetalol 200mg PO bid.      Disposition at Discharge: Home or self care    Discharged Condition: Stable    Patient Instructions:   Current Discharge Medication List        START taking these medications    Details   labetalol (NORMODYNE) 200 MG tablet Take 1 tablet by mouth every 12 hours  Qty: 60 tablet, Refills: 2      ibuprofen (ADVIL;MOTRIN) 800 MG tablet Take 1 tablet by mouth every 8 hours as needed for Pain  Qty: 40 tablet, Refills: 0      NIFEdipine (PROCARDIA XL) 30 MG extended release tablet Take 1 tablet by mouth 2 times daily  Qty: 60 tablet, Refills: 3           CONTINUE these medications which have NOT CHANGED    Details   Prenatal Vit-Fe Fumarate-FA (PRENATAL VITAMIN) 28-0.8 MG TABS tablet Take 1 tablet by mouth daily  Qty: 30 tablet, Refills: 0      !! blood glucose monitor kit and supplies Use as directed to check blood glucose level at home 4 times daily: fasting, and 1hr after each meal

## 2024-10-11 NOTE — PROGRESS NOTES
Post-Partum Day Number 3 Progress Note    Cole Naylor     Assessment: Doing well, post partum day 3    CHTN with SI preE: s/p 24 hours of mag after delivery. Bps were upper end of mild range so labetalol 200mg PO bid was started yesterday and Bps now lower mild range/normal, which is her baseline.  Repeat LFTs still elevated but stable. CBC normal.     Plan:   1. Discharge home today  2. Follow up in office in 1 week for BP check and 6 weeks with Monica Hoffman MD  3. Post partum activity advised, diet as tolerated  4. Discharge Medications: ibuprofen, nifedipine and labetalol and medications prior to admission    Information for the patient's :  Wallace Naylor [[id number]]   Vaginal, Spontaneous     S:  Patient doing well without significant complaint.  Voiding without difficulty, normal lochia. She feels well and desires discharge today.     Vitals:  BP (!) 146/92   Pulse 94   Temp 98.6 °F (37 °C) (Oral)   Resp 16   LMP 2024 (Exact Date)   SpO2 100%   Breastfeeding Unknown   Temp (24hrs), Av.4 °F (36.9 °C), Min:97.7 °F (36.5 °C), Max:98.9 °F (37.2 °C)      Exam:         Patient without distress.                 Abdomen soft, fundus firm, nontender                 Lower extremities are negative for swelling, cords or tenderness.    Labs:     Lab Results   Component Value Date/Time    WBC 17.1 10/10/2024 08:50 AM    WBC 15.4 10/08/2024 09:25 AM    WBC 13.7 10/07/2024 06:04 AM    WBC 16.4 10/04/2024 05:32 AM    WBC 12.6 10/01/2024 12:07 PM    WBC 16.1 2024 10:11 PM    WBC 11.9 2024 03:50 PM    WBC 15.6 2024 11:00 AM    WBC 14.3 05/10/2024 03:14 PM    WBC 16.0 2022 05:13 PM    WBC 17.5 2022 03:19 PM    WBC 13.1 2022 02:30 PM    WBC 12.8 2021 04:00 PM    WBC 12.8 2021 09:40 AM    HGB 11.3 10/10/2024 08:50 AM    HGB 12.5 10/08/2024 09:25 AM    HGB 12.5 10/07/2024 06:04 AM    HGB 11.6 10/04/2024 05:32 AM    HGB 11.3 10/01/2024 12:07 PM  2.2     Extra Tubes Hold    Collection Time: 10/11/24  9:33 AM   Result Value Ref Range    Specimen HOld 1LAV     Comment:        Add-on orders for these samples will be processed based on acceptable specimen integrity and analyte stability, which may vary by analyte.

## 2024-10-11 NOTE — PROGRESS NOTES
Bedside and Verbal shift change report given to Lupe RUSH RN (oncoming nurse) by Jaja JACKMAN RN (offgoing nurse). Report included the following information Nurse Handoff Report, Index, Intake/Output, MAR, and Recent Results.

## 2024-10-11 NOTE — PROGRESS NOTES
Bedside and Verbal shift change report given to clifton rn (oncoming nurse) by bradley rn (offgoing nurse). Report included the following information Nurse Handoff Report, Intake/Output, MAR, Recent Results, Med Rec Status, and Event Log.     Pt stated go tylenol po at 0445, reported per pt. Pt having mild abd cramping with breast feeding at this time.     0930- Labs ordered by Dr. Hoffman/ OB provider, Labs obtained from Left AC, pt agreeable and without difficulty.     1000- Lactation visited pt. No further needs. CM is getting breast pump for pt, through pt insurance.     1345- I have reviewed the provider's instructions with the patient, answering all questions to her satisfaction.

## 2024-10-11 NOTE — CARE COORDINATION
Transition of Care Plan:     RUR: 7%--low  Prior Level of Functioning: independent  Disposition: home  Follow up appointments: OB/GYN  DME needed: breast pump  Transportation at discharge: in car w/family  Caregiver Contact: Keeley Nolasco, parent, [phone]  Discharge Caregiver contacted prior to discharge? N/A  Care Conference needed? no  Barriers to discharge:  clinical status    CM consult placed for breast pump and car seat. CM met with mother at bedside to offer choice. Pump provided. CM will request physician's signature to complete order. CM was also able to provide mother with car seat for discharge.    Maria Vegas LMSW  Care Management [facility]  [phone]

## 2024-10-22 ENCOUNTER — TELEPHONE (OUTPATIENT)
Age: 24
End: 2024-10-22

## 2024-11-01 ENCOUNTER — TELEPHONE (OUTPATIENT)
Age: 24
End: 2024-11-01

## 2024-11-05 RX ORDER — IBUPROFEN 800 MG/1
800 TABLET, FILM COATED ORAL EVERY 8 HOURS PRN
Qty: 40 TABLET | Refills: 0 | OUTPATIENT
Start: 2024-11-05

## 2025-06-01 ENCOUNTER — HOSPITAL ENCOUNTER (EMERGENCY)
Facility: HOSPITAL | Age: 25
Discharge: LEFT BEFORE DISCHARGE | End: 2025-06-01
Attending: EMERGENCY MEDICINE
Payer: MEDICAID

## 2025-06-01 ENCOUNTER — APPOINTMENT (OUTPATIENT)
Facility: HOSPITAL | Age: 25
End: 2025-06-01
Payer: MEDICAID

## 2025-06-01 VITALS
RESPIRATION RATE: 22 BRPM | BODY MASS INDEX: 31.41 KG/M2 | SYSTOLIC BLOOD PRESSURE: 179 MMHG | TEMPERATURE: 98.1 F | HEART RATE: 77 BPM | OXYGEN SATURATION: 100 % | WEIGHT: 160 LBS | HEIGHT: 60 IN | DIASTOLIC BLOOD PRESSURE: 108 MMHG

## 2025-06-01 DIAGNOSIS — R20.0 LEFT SIDED NUMBNESS: ICD-10-CM

## 2025-06-01 DIAGNOSIS — R53.1 ACUTE LEFT-SIDED WEAKNESS: Primary | ICD-10-CM

## 2025-06-01 DIAGNOSIS — I16.1 HYPERTENSIVE EMERGENCY: ICD-10-CM

## 2025-06-01 DIAGNOSIS — Z91.148 NONCOMPLIANCE WITH MEDICATION REGIMEN: ICD-10-CM

## 2025-06-01 LAB
ALBUMIN SERPL-MCNC: 3.8 G/DL (ref 3.5–5)
ALBUMIN/GLOB SERPL: 1 (ref 1.1–2.2)
ALP SERPL-CCNC: 110 U/L (ref 45–117)
ALT SERPL-CCNC: 16 U/L (ref 12–78)
ANION GAP SERPL CALC-SCNC: 8 MMOL/L (ref 2–12)
AST SERPL-CCNC: 18 U/L (ref 15–37)
BASOPHILS # BLD: 0.04 K/UL (ref 0–0.1)
BASOPHILS NFR BLD: 0.4 % (ref 0–1)
BILIRUB SERPL-MCNC: 0.3 MG/DL (ref 0.2–1)
BUN SERPL-MCNC: 8 MG/DL (ref 6–20)
BUN/CREAT SERPL: 9 (ref 12–20)
CALCIUM SERPL-MCNC: 9 MG/DL (ref 8.5–10.1)
CHLORIDE SERPL-SCNC: 104 MMOL/L (ref 97–108)
CO2 SERPL-SCNC: 29 MMOL/L (ref 21–32)
CREAT SERPL-MCNC: 0.88 MG/DL (ref 0.55–1.02)
DIFFERENTIAL METHOD BLD: ABNORMAL
EOSINOPHIL # BLD: 0.12 K/UL (ref 0–0.4)
EOSINOPHIL NFR BLD: 1.2 % (ref 0–7)
ERYTHROCYTE [DISTWIDTH] IN BLOOD BY AUTOMATED COUNT: 15 % (ref 11.5–14.5)
GLOBULIN SER CALC-MCNC: 4 G/DL (ref 2–4)
GLUCOSE BLD STRIP.AUTO-MCNC: 103 MG/DL (ref 65–117)
GLUCOSE SERPL-MCNC: 86 MG/DL (ref 65–100)
HCG SERPL QL: NEGATIVE
HCT VFR BLD AUTO: 37.9 % (ref 35–47)
HGB BLD-MCNC: 12.2 G/DL (ref 11.5–16)
IMM GRANULOCYTES # BLD AUTO: 0.02 K/UL (ref 0–0.04)
IMM GRANULOCYTES NFR BLD AUTO: 0.2 % (ref 0–0.5)
INR PPP: 1 (ref 0.9–1.1)
LYMPHOCYTES # BLD: 2.74 K/UL (ref 0.8–3.5)
LYMPHOCYTES NFR BLD: 27.4 % (ref 12–49)
MCH RBC QN AUTO: 26.3 PG (ref 26–34)
MCHC RBC AUTO-ENTMCNC: 32.2 G/DL (ref 30–36.5)
MCV RBC AUTO: 81.9 FL (ref 80–99)
MONOCYTES # BLD: 0.84 K/UL (ref 0–1)
MONOCYTES NFR BLD: 8.4 % (ref 5–13)
NEUTS SEG # BLD: 6.24 K/UL (ref 1.8–8)
NEUTS SEG NFR BLD: 62.4 % (ref 32–75)
NRBC # BLD: 0 K/UL (ref 0–0.01)
NRBC BLD-RTO: 0 PER 100 WBC
PLATELET # BLD AUTO: 416 K/UL (ref 150–400)
PMV BLD AUTO: 9.2 FL (ref 8.9–12.9)
POTASSIUM SERPL-SCNC: 3.8 MMOL/L (ref 3.5–5.1)
PROT SERPL-MCNC: 7.8 G/DL (ref 6.4–8.2)
PROTHROMBIN TIME: 10.1 SEC (ref 9.2–11.2)
RBC # BLD AUTO: 4.63 M/UL (ref 3.8–5.2)
SERVICE CMNT-IMP: NORMAL
SODIUM SERPL-SCNC: 141 MMOL/L (ref 136–145)
TROPONIN I SERPL HS-MCNC: 12 NG/L (ref 0–51)
WBC # BLD AUTO: 10 K/UL (ref 3.6–11)

## 2025-06-01 PROCEDURE — 84703 CHORIONIC GONADOTROPIN ASSAY: CPT

## 2025-06-01 PROCEDURE — 71045 X-RAY EXAM CHEST 1 VIEW: CPT

## 2025-06-01 PROCEDURE — 82962 GLUCOSE BLOOD TEST: CPT

## 2025-06-01 PROCEDURE — 80053 COMPREHEN METABOLIC PANEL: CPT

## 2025-06-01 PROCEDURE — 36415 COLL VENOUS BLD VENIPUNCTURE: CPT

## 2025-06-01 PROCEDURE — 70450 CT HEAD/BRAIN W/O DYE: CPT

## 2025-06-01 PROCEDURE — 6360000004 HC RX CONTRAST MEDICATION: Performed by: EMERGENCY MEDICINE

## 2025-06-01 PROCEDURE — 6370000000 HC RX 637 (ALT 250 FOR IP): Performed by: EMERGENCY MEDICINE

## 2025-06-01 PROCEDURE — 70498 CT ANGIOGRAPHY NECK: CPT

## 2025-06-01 PROCEDURE — 84484 ASSAY OF TROPONIN QUANT: CPT

## 2025-06-01 PROCEDURE — 85610 PROTHROMBIN TIME: CPT

## 2025-06-01 PROCEDURE — 85025 COMPLETE CBC W/AUTO DIFF WBC: CPT

## 2025-06-01 PROCEDURE — 99285 EMERGENCY DEPT VISIT HI MDM: CPT

## 2025-06-01 RX ORDER — IOPAMIDOL 755 MG/ML
100 INJECTION, SOLUTION INTRAVASCULAR
Status: COMPLETED | OUTPATIENT
Start: 2025-06-01 | End: 2025-06-01

## 2025-06-01 RX ORDER — ASPIRIN 81 MG/1
324 TABLET, CHEWABLE ORAL DAILY
Status: DISCONTINUED | OUTPATIENT
Start: 2025-06-01 | End: 2025-06-01 | Stop reason: HOSPADM

## 2025-06-01 RX ADMIN — ASPIRIN 324 MG: 81 TABLET, CHEWABLE ORAL at 13:29

## 2025-06-01 RX ADMIN — IOPAMIDOL 100 ML: 755 INJECTION, SOLUTION INTRAVENOUS at 12:18

## 2025-06-01 ASSESSMENT — PAIN DESCRIPTION - DESCRIPTORS: DESCRIPTORS: ACHING

## 2025-06-01 ASSESSMENT — PAIN - FUNCTIONAL ASSESSMENT
PAIN_FUNCTIONAL_ASSESSMENT: 0-10
PAIN_FUNCTIONAL_ASSESSMENT: ACTIVITIES ARE NOT PREVENTED

## 2025-06-01 ASSESSMENT — PAIN DESCRIPTION - FREQUENCY: FREQUENCY: CONTINUOUS

## 2025-06-01 ASSESSMENT — PAIN DESCRIPTION - ORIENTATION: ORIENTATION: LEFT

## 2025-06-01 ASSESSMENT — PAIN SCALES - GENERAL: PAINLEVEL_OUTOF10: 10

## 2025-06-01 ASSESSMENT — PAIN DESCRIPTION - LOCATION: LOCATION: GENERALIZED

## 2025-06-01 NOTE — ED NOTES
Patient here with c/o left sided numbness and weakness.  Patient states symptoms started around 8am today.  Patient states that she has had ongoing blood pressure issues ever since recent pregnancy, states that she was taking several blood pressure medications but has been less complaint since giving birth.  Patient states that she has a blood pressure cuff at home and reports her SBP averaging between 160-180 at home.  Patient states concern for family hx of mini strokes.  Patient denies fall or injury, speech is clear.          Emergency Department Nursing Plan of Care       The Nursing Plan of Care is developed from the Nursing assessment and Emergency Department Attending provider initial evaluation.  The plan of care may be reviewed in the “ED Provider note”.      The Plan of Care was developed with the following considerations:  Patient / Family readiness to learn indicated by:verbalized understanding  Persons(s) to be included in education: patient, family  Barriers to Learning/Limitations:None      Signed     Gabriela Loera RN    6/1/2025   11:50 AM

## 2025-06-01 NOTE — ED PROVIDER NOTES
left-sided weakness    2. Left sided numbness    3. Hypertensive emergency    4. Noncompliance with medication regimen         DISPOSITION  Left ED AGAINST MEDICAL ADVICE     PATIENT REFERRED TO:  [facility]  [address] [facility] [address] [phone]  Schedule an appointment as soon as possible for a visit       [facility] Emergency Department  [address] [facility] [id number] [phone]  Go to   As needed, If symptoms worsen    Kayleen Aguilar MD  [address] [place] [address]  [address]  [facility] [address] [phone]    Schedule an appointment as soon as possible for a visit   To establish care with a PCP        DISCHARGE MEDICATIONS:     Medication List        CONTINUE taking these medications      * blood glucose monitor kit and supplies  Dispense sufficient amount for indicated testing frequency plus additional to accommodate PRN testing needs. Dispense all needed supplies to include: monitor, strips, lancing device, lancets, control solutions, alcohol swabs.  Pt will be testing 4 x daily     * blood glucose monitor kit and supplies  Use as directed to check blood glucose level at home 4 times daily: fasting, and 1hr after each meal Please send in blood sugar supplies covered by insurance     Blood Pressure Cuff Misc  1 Application by Does not apply route daily     Lancets Misc  1 each by Does not apply route 4 times daily Use as directed to check blood glucose level at home 4 times daily: fasting, and 1hr after each meal please send in blood sugar supplies covered by insurance           * This list has 2 medication(s) that are the same as other medications prescribed for you. Read the directions carefully, and ask your doctor or other care provider to review them with you.                ASK your doctor about these medications      ibuprofen 800 MG tablet  Commonly known as: ADVIL;MOTRIN  Take 1 tablet by mouth every 8 hours as needed for Pain

## 2025-06-01 NOTE — DISCHARGE INSTRUCTIONS
You came to the emergency department with concern for a stroke with high blood pressure and left-sided weakness and numbness.  Your CT scans were unremarkable.  Neurology and myself recommended that you get an MRI to rule out a stroke however you refused to stay for an MRI.  You are leaving AGAINST MEDICAL ADVICE.  Please know that you can return at any time to resume care.  Please take your blood pressure medications and follow-up with OB/GYN and a primary care doctor.

## 2025-06-01 NOTE — PROGRESS NOTES
1202-Tele Neuro consult initiated. Spoke with Liban perkins w/ all info. Awaiting callback. Info relayed to MD and primary nurse.

## 2025-06-01 NOTE — ED NOTES
Patient refused MRI, refused new IV placement, refused further medications.  Provider aware and provider at bedside discussing AMA with patient and family.  Patient sided AMA paper work and plan of care discussed.  Patient ambulated out, left with family.